# Patient Record
Sex: FEMALE | Race: WHITE | NOT HISPANIC OR LATINO | Employment: PART TIME | URBAN - METROPOLITAN AREA
[De-identification: names, ages, dates, MRNs, and addresses within clinical notes are randomized per-mention and may not be internally consistent; named-entity substitution may affect disease eponyms.]

---

## 2017-01-17 ENCOUNTER — OFFICE VISIT (OUTPATIENT)
Dept: FAMILY MEDICINE | Facility: CLINIC | Age: 67
End: 2017-01-17
Payer: MEDICARE

## 2017-01-17 VITALS
SYSTOLIC BLOOD PRESSURE: 122 MMHG | WEIGHT: 184.06 LBS | BODY MASS INDEX: 33.67 KG/M2 | HEART RATE: 75 BPM | DIASTOLIC BLOOD PRESSURE: 69 MMHG | TEMPERATURE: 99 F

## 2017-01-17 DIAGNOSIS — I25.118 ATHEROSCLEROSIS OF NATIVE CORONARY ARTERY WITH OTHER FORM OF ANGINA PECTORIS, UNSPECIFIED WHETHER NATIVE OR TRANSPLANTED HEART: ICD-10-CM

## 2017-01-17 DIAGNOSIS — I20.89 ANGINA AT REST: ICD-10-CM

## 2017-01-17 DIAGNOSIS — J40 BRONCHITIS: Primary | ICD-10-CM

## 2017-01-17 PROCEDURE — 1157F ADVNC CARE PLAN IN RCRD: CPT | Mod: S$GLB,,, | Performed by: FAMILY MEDICINE

## 2017-01-17 PROCEDURE — 1126F AMNT PAIN NOTED NONE PRSNT: CPT | Mod: S$GLB,,, | Performed by: FAMILY MEDICINE

## 2017-01-17 PROCEDURE — 99999 PR PBB SHADOW E&M-EST. PATIENT-LVL II: CPT | Mod: PBBFAC,,, | Performed by: FAMILY MEDICINE

## 2017-01-17 PROCEDURE — 99213 OFFICE O/P EST LOW 20 MIN: CPT | Mod: 25,S$GLB,, | Performed by: FAMILY MEDICINE

## 2017-01-17 PROCEDURE — 1160F RVW MEDS BY RX/DR IN RCRD: CPT | Mod: S$GLB,,, | Performed by: FAMILY MEDICINE

## 2017-01-17 PROCEDURE — 1159F MED LIST DOCD IN RCRD: CPT | Mod: S$GLB,,, | Performed by: FAMILY MEDICINE

## 2017-01-17 PROCEDURE — 99499 UNLISTED E&M SERVICE: CPT | Mod: S$GLB,,, | Performed by: FAMILY MEDICINE

## 2017-01-17 PROCEDURE — 96372 THER/PROPH/DIAG INJ SC/IM: CPT | Mod: S$GLB,,, | Performed by: FAMILY MEDICINE

## 2017-01-17 RX ORDER — AZITHROMYCIN 250 MG/1
250 TABLET, FILM COATED ORAL DAILY
Qty: 6 TABLET | Refills: 0 | Status: SHIPPED | OUTPATIENT
Start: 2017-01-17 | End: 2017-01-22

## 2017-01-17 RX ORDER — DEXAMETHASONE SODIUM PHOSPHATE 4 MG/ML
8 INJECTION, SOLUTION INTRA-ARTICULAR; INTRALESIONAL; INTRAMUSCULAR; INTRAVENOUS; SOFT TISSUE
Status: COMPLETED | OUTPATIENT
Start: 2017-01-17 | End: 2017-01-17

## 2017-01-17 RX ADMIN — DEXAMETHASONE SODIUM PHOSPHATE 8 MG: 4 INJECTION, SOLUTION INTRA-ARTICULAR; INTRALESIONAL; INTRAMUSCULAR; INTRAVENOUS; SOFT TISSUE at 02:01

## 2017-01-17 NOTE — PROGRESS NOTES
The patient presents co ur hernandez wheezing cough, and fever p severakl years.  Pt follows w CAD  recent angina and some CP w cough   Past Medical History:  Past Medical History   Diagnosis Date    Anticoagulant long-term use     CAD (coronary artery disease)      with stents x 3    GERD (gastroesophageal reflux disease)      Past Surgical History   Procedure Laterality Date    2006 2 stents      2009 1 stent       Review of patient's allergies indicates:   Allergen Reactions    Sulfa (sulfonamide antibiotics) Shortness Of Breath     Current Outpatient Prescriptions on File Prior to Visit   Medication Sig Dispense Refill    aspirin (ECOTRIN) 81 MG EC tablet Take 81 mg by mouth once a week.       atorvastatin (LIPITOR) 40 MG tablet Take 1 tablet (40 mg total) by mouth once daily. 90 tablet 3    clopidogrel (PLAVIX) 75 mg tablet Take 1 tablet (75 mg total) by mouth once daily. 90 tablet 3    NEXIUM 40 mg capsule take 1 capsule by mouth twice a day before MEALS 60 capsule 11    nutritional supplements Liqd Take by mouth.      [DISCONTINUED] diclofenac sodium 1 % Gel Apply 2 g topically 4 (four) times daily. 100 g 2     No current facility-administered medications on file prior to visit.      Social History     Social History    Marital status:      Spouse name: N/A    Number of children: N/A    Years of education: N/A     Occupational History    Not on file.     Social History Main Topics    Smoking status: Never Smoker    Smokeless tobacco: Never Used    Alcohol use No    Drug use: No    Sexual activity: Not on file     Other Topics Concern    Not on file     Social History Narrative    No narrative on file     Family History   Problem Relation Age of Onset    Heart disease Father          ROS:GENERAL: No fever, chills, fatigability or weight loss.  SKIN: No rashes, itching or changes in color or texture of skin.  HEAD: No headaches or recent head trauma.EYES: Visual acuity fine. No  photophobia, ocular pain or diplopia.EARS: Denies ear pain, discharge or vertigo.NOSE: No loss of smell, no epistaxis or postnasal drip.MOUTH & THROAT: No hoarseness or change in voice. No excessive gum bleeding.NODES: Denies swollen glands.  CHEST: Denies MCGARRY, cyanosis, wheezing, cough and sputum production.  CARDIOVASCULAR: Denies chest pain, PND, orthopnea or reduced exercise tolerance.  ABDOMEN: Appetite fine. No weight loss. Denies diarrhea, abdominal pain, hematemesis or blood in stool.  URINARY: No flank pain, dysuria or hematuria.  PERIPHERAL VASCULAR: No claudication or cyanosis.  MUSCULOSKELETAL: See above.  NEUROLOGIC: No history of seizures, paralysis, alteration of gait or coordination.  PE:   HEAD: Normocephalic, atraumatic.EYES: PERRL. EOMI.   EARS: TM's intact. Light reflex normal. No retraction or perforation.   NOSE: Mucosa pink. Airway clear.MOUTH & THROAT: No tonsillar enlargement. No pharyngeal erythema or exudate. No stridor.  NODES: No cervical, axillary or inguinal lymph node enlargement.  CHEST: Mod ronchi and exp wheezing   CARDIOVASCULAR: Normal S1, S2. No rubs, murmurs or gallops.  ABDOMEN: Bowel sounds normal. Not distended. Soft. No tenderness or masses.  MUSCULOSKELETAL: No palpable abnormality  NEUROLOGIC: Cranial Nerves: II-XII grossly intact.  Motor: 5/5 strength major flexors/extensors.  DTR's: Knees, Ankles 2+ and equal bilaterally; downgoing toes.  Sensory: Intact to light touch distally.  Gait & Posture: Normal gait and fine motion. No cerebellar signs.     Impression:Bronchitis w bronchospasm   CAD   Plan: Dexa 8; Zpak   Card fu

## 2017-01-23 ENCOUNTER — TELEPHONE (OUTPATIENT)
Dept: FAMILY MEDICINE | Facility: CLINIC | Age: 67
End: 2017-01-23

## 2017-01-23 RX ORDER — CEPHALEXIN 500 MG/1
500 CAPSULE ORAL EVERY 12 HOURS
Qty: 14 CAPSULE | Refills: 0 | Status: SHIPPED | OUTPATIENT
Start: 2017-01-23 | End: 2017-03-27

## 2017-01-23 NOTE — TELEPHONE ENCOUNTER
----- Message from Tiffany Marsh sent at 1/23/2017 12:16 PM CST -----  Contact: Patient  Patient states she is still congested and wants to know if she needs another round of antibiotics. Please call her back at [phone]. Thank you

## 2017-03-27 ENCOUNTER — TELEPHONE (OUTPATIENT)
Dept: FAMILY MEDICINE | Facility: CLINIC | Age: 67
End: 2017-03-27

## 2017-03-27 ENCOUNTER — OFFICE VISIT (OUTPATIENT)
Dept: FAMILY MEDICINE | Facility: CLINIC | Age: 67
End: 2017-03-27
Payer: MEDICARE

## 2017-03-27 VITALS
BODY MASS INDEX: 34.08 KG/M2 | HEIGHT: 62 IN | DIASTOLIC BLOOD PRESSURE: 85 MMHG | TEMPERATURE: 99 F | HEART RATE: 93 BPM | SYSTOLIC BLOOD PRESSURE: 135 MMHG | WEIGHT: 185.19 LBS

## 2017-03-27 DIAGNOSIS — E78.5 DYSLIPIDEMIA: ICD-10-CM

## 2017-03-27 DIAGNOSIS — E03.9 HYPOTHYROIDISM, UNSPECIFIED TYPE: ICD-10-CM

## 2017-03-27 DIAGNOSIS — I25.10 CORONARY ARTERY DISEASE, ANGINA PRESENCE UNSPECIFIED, UNSPECIFIED VESSEL OR LESION TYPE, UNSPECIFIED WHETHER NATIVE OR TRANSPLANTED HEART: ICD-10-CM

## 2017-03-27 DIAGNOSIS — I25.118 ATHEROSCLEROSIS OF NATIVE CORONARY ARTERY WITH OTHER FORM OF ANGINA PECTORIS, UNSPECIFIED WHETHER NATIVE OR TRANSPLANTED HEART: ICD-10-CM

## 2017-03-27 DIAGNOSIS — L20.9 ATOPIC DERMATITIS, UNSPECIFIED TYPE: Primary | ICD-10-CM

## 2017-03-27 DIAGNOSIS — B35.9 TINEA: ICD-10-CM

## 2017-03-27 PROBLEM — I20.89 ANGINA AT REST: Status: RESOLVED | Noted: 2017-01-17 | Resolved: 2017-03-27

## 2017-03-27 PROCEDURE — 1159F MED LIST DOCD IN RCRD: CPT | Mod: S$GLB,,, | Performed by: FAMILY MEDICINE

## 2017-03-27 PROCEDURE — 99999 PR PBB SHADOW E&M-EST. PATIENT-LVL II: CPT | Mod: PBBFAC,,, | Performed by: FAMILY MEDICINE

## 2017-03-27 PROCEDURE — 1157F ADVNC CARE PLAN IN RCRD: CPT | Mod: S$GLB,,, | Performed by: FAMILY MEDICINE

## 2017-03-27 PROCEDURE — 99213 OFFICE O/P EST LOW 20 MIN: CPT | Mod: S$GLB,,, | Performed by: FAMILY MEDICINE

## 2017-03-27 PROCEDURE — 1160F RVW MEDS BY RX/DR IN RCRD: CPT | Mod: S$GLB,,, | Performed by: FAMILY MEDICINE

## 2017-03-27 RX ORDER — CLOTRIMAZOLE AND BETAMETHASONE DIPROPIONATE 10; .5 MG/ML; MG/ML
LOTION TOPICAL 2 TIMES DAILY
Qty: 30 ML | Refills: 1 | Status: SHIPPED | OUTPATIENT
Start: 2017-03-27 | End: 2018-11-14

## 2017-03-27 NOTE — PROGRESS NOTES
The patient presents with tender itchy rash rt pretibial area  for the past 3 -4 weeks  but no fever.  ROS:  General: No fever or sig wt change  HEENT:No other PND eye pain or dc  Respiratory: No cough wheezing  PE: vital signs noted  HEENT: Normocephalic,with no recent trauma,PERRLA,EOMI,conjunctiva injected with no exudate.Nasopharynx is injected and edematous.External otic canal edematous and injected TM dull  Neck:Supple without adenopathy  Chest:Clear bilateral breath sounds   Heart:Regular rhthym without murmer  Abdomen:Soft, non tender,no masses, no hepatosplenomegaly  Extremeties Rt pretibial area 15x10 cm area of papular eruption   Impression:Atopic dermatitis vs tinea   Plan:  Lotresone and if ftp consider Derm consult

## 2017-03-28 ENCOUNTER — LAB VISIT (OUTPATIENT)
Dept: LAB | Facility: HOSPITAL | Age: 67
End: 2017-03-28
Attending: FAMILY MEDICINE
Payer: MEDICARE

## 2017-03-28 DIAGNOSIS — E03.9 HYPOTHYROIDISM, UNSPECIFIED TYPE: ICD-10-CM

## 2017-03-28 DIAGNOSIS — I25.10 CORONARY ARTERY DISEASE, ANGINA PRESENCE UNSPECIFIED, UNSPECIFIED VESSEL OR LESION TYPE, UNSPECIFIED WHETHER NATIVE OR TRANSPLANTED HEART: ICD-10-CM

## 2017-03-28 LAB
ALBUMIN SERPL BCP-MCNC: 3.9 G/DL
ALP SERPL-CCNC: 105 U/L
ALT SERPL W/O P-5'-P-CCNC: 19 U/L
ANION GAP SERPL CALC-SCNC: 10 MMOL/L
AST SERPL-CCNC: 17 U/L
BASOPHILS # BLD AUTO: 0.03 K/UL
BASOPHILS NFR BLD: 0.4 %
BILIRUB SERPL-MCNC: 0.6 MG/DL
BNP SERPL-MCNC: 29 PG/ML
BUN SERPL-MCNC: 17 MG/DL
CALCIUM SERPL-MCNC: 9.9 MG/DL
CHLORIDE SERPL-SCNC: 107 MMOL/L
CHOLEST/HDLC SERPL: 2.7 {RATIO}
CO2 SERPL-SCNC: 25 MMOL/L
CREAT SERPL-MCNC: 0.8 MG/DL
DIFFERENTIAL METHOD: NORMAL
EOSINOPHIL # BLD AUTO: 0.2 K/UL
EOSINOPHIL NFR BLD: 3.4 %
ERYTHROCYTE [DISTWIDTH] IN BLOOD BY AUTOMATED COUNT: 12.4 %
EST. GFR  (AFRICAN AMERICAN): >60 ML/MIN/1.73 M^2
EST. GFR  (NON AFRICAN AMERICAN): >60 ML/MIN/1.73 M^2
GLUCOSE SERPL-MCNC: 107 MG/DL
HCT VFR BLD AUTO: 40.3 %
HDL/CHOLESTEROL RATIO: 37.1 %
HDLC SERPL-MCNC: 143 MG/DL
HDLC SERPL-MCNC: 53 MG/DL
HGB BLD-MCNC: 13.4 G/DL
LDLC SERPL CALC-MCNC: 73.4 MG/DL
LYMPHOCYTES # BLD AUTO: 1.8 K/UL
LYMPHOCYTES NFR BLD: 25.5 %
MCH RBC QN AUTO: 30.1 PG
MCHC RBC AUTO-ENTMCNC: 33.3 %
MCV RBC AUTO: 91 FL
MONOCYTES # BLD AUTO: 0.4 K/UL
MONOCYTES NFR BLD: 5.3 %
NEUTROPHILS # BLD AUTO: 4.5 K/UL
NEUTROPHILS NFR BLD: 65.1 %
NONHDLC SERPL-MCNC: 90 MG/DL
PLATELET # BLD AUTO: 182 K/UL
PMV BLD AUTO: 11.9 FL
POTASSIUM SERPL-SCNC: 4.8 MMOL/L
PROT SERPL-MCNC: 7.8 G/DL
RBC # BLD AUTO: 4.45 M/UL
SODIUM SERPL-SCNC: 142 MMOL/L
T4 FREE SERPL-MCNC: 0.78 NG/DL
TRIGL SERPL-MCNC: 83 MG/DL
TSH SERPL DL<=0.005 MIU/L-ACNC: 5.13 UIU/ML
WBC # BLD AUTO: 6.98 K/UL

## 2017-03-28 PROCEDURE — 80053 COMPREHEN METABOLIC PANEL: CPT

## 2017-03-28 PROCEDURE — 85025 COMPLETE CBC W/AUTO DIFF WBC: CPT

## 2017-03-28 PROCEDURE — 36415 COLL VENOUS BLD VENIPUNCTURE: CPT | Mod: PO

## 2017-03-28 PROCEDURE — 84443 ASSAY THYROID STIM HORMONE: CPT

## 2017-03-28 PROCEDURE — 83880 ASSAY OF NATRIURETIC PEPTIDE: CPT

## 2017-03-28 PROCEDURE — 84439 ASSAY OF FREE THYROXINE: CPT

## 2017-03-28 PROCEDURE — 80061 LIPID PANEL: CPT

## 2017-03-30 ENCOUNTER — TELEPHONE (OUTPATIENT)
Dept: FAMILY MEDICINE | Facility: CLINIC | Age: 67
End: 2017-03-30

## 2017-03-30 DIAGNOSIS — E03.9 HYPOTHYROIDISM, UNSPECIFIED TYPE: Primary | ICD-10-CM

## 2017-04-10 RX ORDER — CLOPIDOGREL BISULFATE 75 MG/1
TABLET ORAL
Qty: 90 TABLET | Refills: 3 | Status: SHIPPED | OUTPATIENT
Start: 2017-04-10 | End: 2017-04-13 | Stop reason: SDUPTHER

## 2017-04-10 RX ORDER — ATORVASTATIN CALCIUM 40 MG/1
TABLET, FILM COATED ORAL
Qty: 90 TABLET | Refills: 3 | Status: SHIPPED | OUTPATIENT
Start: 2017-04-10 | End: 2017-04-13 | Stop reason: SDUPTHER

## 2017-04-13 NOTE — TELEPHONE ENCOUNTER
----- Message from Laney Miller sent at 4/13/2017 10:27 AM CDT -----  Contact: self   Patient need a refill on Plavix,daphnie send to [facility], any questions please call back at [phone] (home)     [facility] - [place], [place] - [address]  [address] [address]  Phone: [phone] Fax: [phone]

## 2017-04-14 RX ORDER — ATORVASTATIN CALCIUM 40 MG/1
40 TABLET, FILM COATED ORAL DAILY
Qty: 90 TABLET | Refills: 3 | Status: SHIPPED | OUTPATIENT
Start: 2017-04-14 | End: 2018-03-20 | Stop reason: SDUPTHER

## 2017-04-14 RX ORDER — CLOPIDOGREL BISULFATE 75 MG/1
75 TABLET ORAL DAILY
Qty: 90 TABLET | Refills: 3 | Status: SHIPPED | OUTPATIENT
Start: 2017-04-14 | End: 2018-03-20 | Stop reason: SDUPTHER

## 2017-05-11 ENCOUNTER — PATIENT OUTREACH (OUTPATIENT)
Dept: ADMINISTRATIVE | Facility: HOSPITAL | Age: 67
End: 2017-05-11

## 2017-05-24 ENCOUNTER — OFFICE VISIT (OUTPATIENT)
Dept: FAMILY MEDICINE | Facility: CLINIC | Age: 67
End: 2017-05-24
Payer: MEDICARE

## 2017-05-24 ENCOUNTER — HOSPITAL ENCOUNTER (OUTPATIENT)
Dept: RADIOLOGY | Facility: HOSPITAL | Age: 67
Discharge: HOME | End: 2017-05-24
Attending: FAMILY MEDICINE
Payer: MEDICARE

## 2017-05-24 VITALS
DIASTOLIC BLOOD PRESSURE: 66 MMHG | HEIGHT: 62 IN | WEIGHT: 182 LBS | SYSTOLIC BLOOD PRESSURE: 125 MMHG | BODY MASS INDEX: 33.49 KG/M2 | HEART RATE: 74 BPM | TEMPERATURE: 98 F

## 2017-05-24 DIAGNOSIS — S63.631D SPRAIN OF INTERPHALANGEAL JOINT OF LEFT INDEX FINGER, SUBSEQUENT ENCOUNTER: Primary | ICD-10-CM

## 2017-05-24 DIAGNOSIS — S69.91XA FINGER INJURY, RIGHT, INITIAL ENCOUNTER: ICD-10-CM

## 2017-05-24 DIAGNOSIS — S69.91XA FINGER INJURY, RIGHT, INITIAL ENCOUNTER: Primary | ICD-10-CM

## 2017-05-24 PROCEDURE — 1159F MED LIST DOCD IN RCRD: CPT | Mod: S$GLB,,, | Performed by: FAMILY MEDICINE

## 2017-05-24 PROCEDURE — 73140 X-RAY EXAM OF FINGER(S): CPT | Mod: 26,RT,, | Performed by: RADIOLOGY

## 2017-05-24 PROCEDURE — 99213 OFFICE O/P EST LOW 20 MIN: CPT | Mod: S$GLB,,, | Performed by: FAMILY MEDICINE

## 2017-05-24 PROCEDURE — 99999 PR PBB SHADOW E&M-EST. PATIENT-LVL II: CPT | Mod: PBBFAC,,, | Performed by: FAMILY MEDICINE

## 2017-05-24 PROCEDURE — 73140 X-RAY EXAM OF FINGER(S): CPT | Mod: TC,PO,RT

## 2017-05-24 PROCEDURE — 1160F RVW MEDS BY RX/DR IN RCRD: CPT | Mod: S$GLB,,, | Performed by: FAMILY MEDICINE

## 2017-05-24 NOTE — PROGRESS NOTES
The patient presents with tender painful dip for the past 6w nnot resp to splinting ROS:  General: No fever or sig wt change  HEENT:No other PND eye pain or dc  Respiratory: No cough wheezing  PE: vital signs noted  HEENT: Normocephalic,with no recent trauma,PERRLA,EOMI,conjunctiva injected with no exudate.Nasopharynx is injected and edematous.External otic canal edematous and injected TM dull  Neck:Supple without adenopathy  Chest:Clear bilateral breath sounds    Heart:Regular rhthym without murmer  Abdomen:Soft, non tender,no masses, no hepatosplenomegaly  Extremeties Mallet finger deformity    Impression Finger sprain   Plan: XR  Ortho con

## 2017-10-10 ENCOUNTER — OFFICE VISIT (OUTPATIENT)
Dept: CARDIOLOGY | Facility: CLINIC | Age: 67
End: 2017-10-10
Payer: MEDICARE

## 2017-10-10 VITALS
WEIGHT: 187.63 LBS | SYSTOLIC BLOOD PRESSURE: 131 MMHG | HEART RATE: 69 BPM | DIASTOLIC BLOOD PRESSURE: 64 MMHG | HEIGHT: 62 IN | BODY MASS INDEX: 34.53 KG/M2

## 2017-10-10 DIAGNOSIS — E78.5 DYSLIPIDEMIA: ICD-10-CM

## 2017-10-10 DIAGNOSIS — I25.118 ATHEROSCLEROSIS OF NATIVE CORONARY ARTERY WITH OTHER FORM OF ANGINA PECTORIS, UNSPECIFIED WHETHER NATIVE OR TRANSPLANTED HEART: Primary | ICD-10-CM

## 2017-10-10 PROCEDURE — 99499 UNLISTED E&M SERVICE: CPT | Mod: S$GLB,,, | Performed by: INTERNAL MEDICINE

## 2017-10-10 PROCEDURE — 99214 OFFICE O/P EST MOD 30 MIN: CPT | Mod: S$GLB,,, | Performed by: INTERNAL MEDICINE

## 2017-10-10 PROCEDURE — 99999 PR PBB SHADOW E&M-EST. PATIENT-LVL II: CPT | Mod: PBBFAC,,, | Performed by: INTERNAL MEDICINE

## 2017-10-10 NOTE — PROGRESS NOTES
Subjective:    Patient ID:  Flores Adamson is a 67 y.o. female who presents for follow-up of cad    HPI  She comes with no complaints, no chest pain, no shortness of breath  FC II    Review of Systems   Constitution: Negative for decreased appetite, weakness, malaise/fatigue, weight gain and weight loss.   Cardiovascular: Negative for chest pain, dyspnea on exertion, leg swelling, palpitations and syncope.   Respiratory: Negative for cough and shortness of breath.    Gastrointestinal: Negative.    All other systems reviewed and are negative.       Objective:    Physical Exam   Constitutional: She is oriented to person, place, and time. She appears well-developed and well-nourished.   HENT:   Head: Normocephalic.   Eyes: Pupils are equal, round, and reactive to light.   Neck: Normal range of motion. Neck supple. No JVD present. Carotid bruit is not present. No thyromegaly present.   Cardiovascular: Normal rate, regular rhythm, normal heart sounds, intact distal pulses and normal pulses.  PMI is not displaced.  Exam reveals no gallop.    No murmur heard.  Pulmonary/Chest: Effort normal and breath sounds normal.   Abdominal: Soft. Normal appearance. She exhibits no mass. There is no hepatosplenomegaly. There is no tenderness.   Musculoskeletal: Normal range of motion. She exhibits no edema.   Neurological: She is alert and oriented to person, place, and time. She has normal strength and normal reflexes. No sensory deficit.   Skin: Skin is warm and intact.   Psychiatric: She has a normal mood and affect.   Nursing note and vitals reviewed.        Assessment:       1. Atherosclerosis of native coronary artery with other form of angina pectoris, unspecified whether native or transplanted heart    2. Dyslipidemia         Plan:     Continue all cardiac medications  Regular exercise program  Weight loss  1 yr f/u with stress echo

## 2017-11-15 ENCOUNTER — OFFICE VISIT (OUTPATIENT)
Dept: FAMILY MEDICINE | Facility: CLINIC | Age: 67
End: 2017-11-15
Payer: MEDICARE

## 2017-11-15 VITALS
SYSTOLIC BLOOD PRESSURE: 119 MMHG | DIASTOLIC BLOOD PRESSURE: 72 MMHG | TEMPERATURE: 98 F | WEIGHT: 185 LBS | BODY MASS INDEX: 34.04 KG/M2 | HEIGHT: 62 IN | HEART RATE: 68 BPM

## 2017-11-15 DIAGNOSIS — J06.9 VIRAL URI WITH COUGH: Primary | ICD-10-CM

## 2017-11-15 PROCEDURE — 96372 THER/PROPH/DIAG INJ SC/IM: CPT | Mod: S$GLB,,, | Performed by: FAMILY MEDICINE

## 2017-11-15 PROCEDURE — 99999 PR PBB SHADOW E&M-EST. PATIENT-LVL III: CPT | Mod: PBBFAC,,, | Performed by: NURSE PRACTITIONER

## 2017-11-15 PROCEDURE — 99213 OFFICE O/P EST LOW 20 MIN: CPT | Mod: 25,S$GLB,, | Performed by: NURSE PRACTITIONER

## 2017-11-15 RX ORDER — DEXAMETHASONE SODIUM PHOSPHATE 4 MG/ML
8 INJECTION, SOLUTION INTRA-ARTICULAR; INTRALESIONAL; INTRAMUSCULAR; INTRAVENOUS; SOFT TISSUE
Status: COMPLETED | OUTPATIENT
Start: 2017-11-15 | End: 2017-11-15

## 2017-11-15 RX ORDER — BENZONATATE 200 MG/1
200 CAPSULE ORAL 3 TIMES DAILY PRN
Qty: 30 CAPSULE | Refills: 0 | Status: SHIPPED | OUTPATIENT
Start: 2017-11-15 | End: 2017-11-25

## 2017-11-15 RX ADMIN — DEXAMETHASONE SODIUM PHOSPHATE 8 MG: 4 INJECTION, SOLUTION INTRA-ARTICULAR; INTRALESIONAL; INTRAMUSCULAR; INTRAVENOUS; SOFT TISSUE at 10:11

## 2017-11-15 NOTE — PATIENT INSTRUCTIONS
Increase fluid intake  Rest  Tylenol as directed, as needed for headache/pain  Coricidin HBP for symptomatic care of symptoms  Humidified air  Lozenges and/or chloraseptic spray as needed for sore throat  Warm salt water gargles as needed for sore throat  Educational handout provided        Adult Self-Care for Colds  Colds are caused by viruses. They can't be cured with antibiotics. However, you can ease symptoms and support your body's efforts to heal itself.  No matter which symptoms you have, be sure to:  · Drink plenty of fluids (water or clear soup)  · Stop smoking and drinking alcohol  · Get plenty of rest    Understand a fever  · Take your temperature several times a day. If your fever is 100.4°F (38.0°C) for more than a day, call your healthcare provider.  · Relax, lie down. Go to bed if you want. Just get off your feet and rest. Also, drink plenty of fluids to avoid dehydration.  · Take acetaminophen or a nonsteroidal anti-inflammatory agent (NSAID), such as ibuprofen.  Treat a troubled nose kindly  · Breathe steam or heated humidified air to open blocked nasal passages.  a hot shower or use a vaporizer. Be careful not to get burned by the steam.  · Saline nasal sprays and decongestant tablets help open a stuffy nose. Antihistamines can also help, but they can cause side effects such as drowsiness and drying of the eyes, nose, and mouth.  Soothe a sore throat and cough  · Gargle every 2 hours with 1/4 teaspoon of salt dissolved in 1/2 cup of warm water. Suck on throat lozenges and cough drops to moisten your throat.  · Cough medicines are available but it is unclear how well they actually work.  · Take acetaminophen or an NSAID, such as ibuprofen, to ease throat pain  Ease digestive problems  · Put fluids back into your body. Take frequent sips of clear liquids such as water or broth. Avoid drinks that have a lot of sugar in them, such as juices and sodas. These can make diarrhea worse. Older  children and adults can drink sports drinks.  · As your appetite returns, you can resume your normal diet. Ask your healthcare provider if there are any foods you should avoid.  When to seek medical care  When you first notice symptoms, ask your healthcare provider if antiviral medicines are appropriate. Antibiotics should not be taken for colds or flu. Also, call your healthcare provider if you have any of the following symptoms or if you aren't feeling better after 7 days:  · Shortness of breath  · Pain or pressure in the chest or belly (abdomen)  · Worsening symptoms, especially after a period of improvement  · Fever of 100.4°F  (38.0°C) or higher, or fever that doesn't go down with medicine  · Sudden dizziness or confusion  · Severe or continued vomiting  · Signs of dehydration, including extreme thirst, dark urine, infrequent urination, dry mouth  · Spotted, red, or very sore throat   Date Last Reviewed: 12/1/2016  © 1575-3710 [employer]. [address] [place] [address], [place] [address]. All rights reserved. This information is not intended as a substitute for professional medical care. Always follow your healthcare professional's instructions.

## 2017-11-15 NOTE — PROGRESS NOTES
"Subjective:      Patient ID: Flores Adamson is a 67 y.o. female.    Chief Complaint: Cough; Nasal Congestion; and Sore Throat    Cough   This is a new problem. Episode onset: Monday. The problem has been gradually worsening. The cough is productive of sputum (productive of pale yellow sputum). Associated symptoms include nasal congestion, postnasal drip, rhinorrhea and a sore throat. Pertinent negatives include no chest pain, chills, ear congestion, ear pain, fever, headaches, myalgias, rash, shortness of breath, sweats or wheezing. Nothing aggravates the symptoms. She has tried nothing for the symptoms. The treatment provided no relief.   Sore Throat    This is a new problem. Neither side of throat is experiencing more pain than the other. There has been no fever. The pain is moderate. Associated symptoms include congestion, coughing and a hoarse voice. Pertinent negatives include no ear pain, headaches, shortness of breath, stridor, swollen glands, trouble swallowing or vomiting. She has had no exposure to strep or mono. Treatments tried: peroxide gargle. The treatment provided mild relief.     Review of Systems   Constitutional: Negative for chills, fatigue and fever.   HENT: Positive for congestion, hoarse voice, postnasal drip, rhinorrhea and sore throat. Negative for ear pain, sinus pain, sinus pressure and trouble swallowing.    Eyes: Negative.    Respiratory: Positive for cough. Negative for shortness of breath, wheezing and stridor.    Cardiovascular: Negative for chest pain, palpitations and leg swelling.   Gastrointestinal: Negative.  Negative for vomiting.   Endocrine: Negative.    Genitourinary: Negative.    Musculoskeletal: Negative for myalgias.   Skin: Negative for rash and wound.   Neurological: Negative for weakness, numbness and headaches.   Psychiatric/Behavioral: The patient is not nervous/anxious.        Objective:     /72   Pulse 68   Temp 98.1 °F (36.7 °C) (Oral)   Ht 5' 2" (1.575 " m)   Wt 83.9 kg (185 lb)   BMI 33.84 kg/m²     Physical Exam   Constitutional: She is oriented to person, place, and time. She appears well-developed and well-nourished.   HENT:   Head: Normocephalic.   Right Ear: Tympanic membrane, external ear and ear canal normal.   Left Ear: Tympanic membrane, external ear and ear canal normal.   Nose: Mucosal edema and rhinorrhea present. Right sinus exhibits no maxillary sinus tenderness and no frontal sinus tenderness. Left sinus exhibits no maxillary sinus tenderness and no frontal sinus tenderness.   Mouth/Throat: Posterior oropharyngeal erythema (very mild) present. No oropharyngeal exudate or posterior oropharyngeal edema.   Nasal mucosa erythematous and boggy   Eyes: Pupils are equal, round, and reactive to light.   Cardiovascular: Normal rate, regular rhythm and normal heart sounds.    Pulmonary/Chest: Effort normal and breath sounds normal. No respiratory distress. She has no wheezes. She has no rales.   Neurological: She is alert and oriented to person, place, and time.   Skin: Skin is warm and dry. No rash noted.   Psychiatric: She has a normal mood and affect. Her behavior is normal. Judgment and thought content normal.   Vitals reviewed.    Assessment:     1. Viral URI with cough        Plan:   Viral URI with cough  -     dexamethasone injection 8 mg; Inject 2 mLs (8 mg total) into the muscle one time.  -     benzonatate (TESSALON) 200 MG capsule; Take 1 capsule (200 mg total) by mouth 3 (three) times daily as needed.  Dispense: 30 capsule; Refill: 0    Symptom care discussed and educational handout provided  Return if symptoms worsen or fail to improve.

## 2017-12-15 ENCOUNTER — DOCUMENTATION ONLY (OUTPATIENT)
Dept: GASTROENTEROLOGY | Facility: CLINIC | Age: 67
End: 2017-12-15

## 2017-12-15 NOTE — PROGRESS NOTES
Colonoscopy has been completed and faxed to [facility] as attestation documentation for Colorectal Cancer Screening. 2017 measure has been met.

## 2018-01-08 ENCOUNTER — TELEPHONE (OUTPATIENT)
Dept: FAMILY MEDICINE | Facility: CLINIC | Age: 68
End: 2018-01-08

## 2018-01-08 NOTE — TELEPHONE ENCOUNTER
----- Message from Felicia Nguyen sent at 1/8/2018  9:57 AM CST -----  Contact: Flores  ( pt wife )  Flores  ( pt wife ) is requesting a call from nurse to review labs.            Please callFlores  ( pt wife ) pt back at [phone]

## 2018-01-15 ENCOUNTER — PES CALL (OUTPATIENT)
Dept: ADMINISTRATIVE | Facility: CLINIC | Age: 68
End: 2018-01-15

## 2018-03-14 ENCOUNTER — TELEPHONE (OUTPATIENT)
Dept: CARDIOLOGY | Facility: CLINIC | Age: 68
End: 2018-03-14

## 2018-03-14 NOTE — TELEPHONE ENCOUNTER
----- Message from Quin Araujo sent at 3/14/2018  9:56 AM CDT -----  Patient stated she is scheduled for surgery on Friday, March 16th for broken wrist/needs doctor's authorization to get off of medication: Plavix/please call back at [phone] to advise.

## 2018-03-14 NOTE — TELEPHONE ENCOUNTER
Cardiac clearance: patient having a procedure on her wrist. Can she hold PLAVIX/ASA if so how long.

## 2018-03-15 ENCOUNTER — TELEPHONE (OUTPATIENT)
Dept: CARDIOLOGY | Facility: CLINIC | Age: 68
End: 2018-03-15

## 2018-03-15 NOTE — TELEPHONE ENCOUNTER
----- Message from Quin Zafar sent at 3/15/2018  2:15 PM CDT -----  Lizett with [facility] is requesting a copy of patient EKG, stress test and Plavix instructions faxed to her at [phone], patient is scheduled for surgery tomorrow, please contact her at [phone].    Thank you

## 2018-03-15 NOTE — TELEPHONE ENCOUNTER
No contraindication for surgery/anesthesia from cardiac standpoint. She may hold her plavix/ASA for 3 days if needed and resume after.

## 2018-03-20 RX ORDER — CLOPIDOGREL BISULFATE 75 MG/1
TABLET ORAL
Qty: 90 TABLET | Refills: 3 | Status: SHIPPED | OUTPATIENT
Start: 2018-03-20 | End: 2018-11-26 | Stop reason: ALTCHOICE

## 2018-03-20 RX ORDER — ATORVASTATIN CALCIUM 40 MG/1
TABLET, FILM COATED ORAL
Qty: 90 TABLET | Refills: 3 | Status: SHIPPED | OUTPATIENT
Start: 2018-03-20 | End: 2019-02-11 | Stop reason: SDUPTHER

## 2018-04-06 ENCOUNTER — TELEPHONE (OUTPATIENT)
Dept: FAMILY MEDICINE | Facility: CLINIC | Age: 68
End: 2018-04-06

## 2018-04-06 NOTE — TELEPHONE ENCOUNTER
----- Message from Eve Carlin sent at 4/6/2018  1:03 PM CDT -----  Contact: Pt  Pt called and stated she needed to speak to the nurse. She stated that she has a death in the family and did not give further details. She can be reached at [phone].    Thanks,  Tf

## 2018-07-10 ENCOUNTER — LAB VISIT (OUTPATIENT)
Dept: LAB | Facility: HOSPITAL | Age: 68
End: 2018-07-10
Attending: NURSE PRACTITIONER
Payer: MEDICARE

## 2018-07-10 ENCOUNTER — OFFICE VISIT (OUTPATIENT)
Dept: FAMILY MEDICINE | Facility: CLINIC | Age: 68
End: 2018-07-10
Payer: MEDICARE

## 2018-07-10 VITALS
DIASTOLIC BLOOD PRESSURE: 67 MMHG | SYSTOLIC BLOOD PRESSURE: 109 MMHG | HEIGHT: 62 IN | BODY MASS INDEX: 31.28 KG/M2 | TEMPERATURE: 98 F | HEART RATE: 78 BPM | WEIGHT: 170 LBS

## 2018-07-10 DIAGNOSIS — K13.70 UNSPECIFIED LESIONS OF ORAL MUCOSA: ICD-10-CM

## 2018-07-10 DIAGNOSIS — K13.70 UNSPECIFIED LESIONS OF ORAL MUCOSA: Primary | ICD-10-CM

## 2018-07-10 DIAGNOSIS — Z79.899 OTHER LONG TERM (CURRENT) DRUG THERAPY: ICD-10-CM

## 2018-07-10 LAB
BASOPHILS # BLD AUTO: 0.04 K/UL
BASOPHILS NFR BLD: 0.6 %
DIFFERENTIAL METHOD: NORMAL
EOSINOPHIL # BLD AUTO: 0.2 K/UL
EOSINOPHIL NFR BLD: 2.5 %
ERYTHROCYTE [DISTWIDTH] IN BLOOD BY AUTOMATED COUNT: 12.5 %
HCT VFR BLD AUTO: 40.9 %
HGB BLD-MCNC: 13.2 G/DL
IMM GRANULOCYTES # BLD AUTO: 0.01 K/UL
IMM GRANULOCYTES NFR BLD AUTO: 0.1 %
LYMPHOCYTES # BLD AUTO: 1.6 K/UL
LYMPHOCYTES NFR BLD: 21.8 %
MCH RBC QN AUTO: 30.7 PG
MCHC RBC AUTO-ENTMCNC: 32.3 G/DL
MCV RBC AUTO: 95 FL
MONOCYTES # BLD AUTO: 0.6 K/UL
MONOCYTES NFR BLD: 8.5 %
NEUTROPHILS # BLD AUTO: 4.7 K/UL
NEUTROPHILS NFR BLD: 66.5 %
NRBC BLD-RTO: 0 /100 WBC
PLATELET # BLD AUTO: 156 K/UL
PMV BLD AUTO: 12.8 FL
RBC # BLD AUTO: 4.3 M/UL
VIT B12 SERPL-MCNC: 747 PG/ML
WBC # BLD AUTO: 7.1 K/UL

## 2018-07-10 PROCEDURE — 82607 VITAMIN B-12: CPT

## 2018-07-10 PROCEDURE — 87070 CULTURE OTHR SPECIMN AEROBIC: CPT

## 2018-07-10 PROCEDURE — 36415 COLL VENOUS BLD VENIPUNCTURE: CPT | Mod: PO

## 2018-07-10 PROCEDURE — 99213 OFFICE O/P EST LOW 20 MIN: CPT | Mod: S$GLB,,, | Performed by: NURSE PRACTITIONER

## 2018-07-10 PROCEDURE — 85025 COMPLETE CBC W/AUTO DIFF WBC: CPT

## 2018-07-10 PROCEDURE — 99999 PR PBB SHADOW E&M-EST. PATIENT-LVL V: CPT | Mod: PBBFAC,,, | Performed by: NURSE PRACTITIONER

## 2018-07-10 NOTE — PROGRESS NOTES
Subjective:       Patient ID: Flores Adamson is a 67 y.o. female.    Chief Complaint: Mouth Lesions    Mouth Lesions    The current episode started yesterday. The onset was sudden. The problem occurs rarely. The problem has been unchanged. The problem is mild (No pain at present; states have bleed; pt currently takes plavix). Nothing relieves the symptoms. Nothing aggravates the symptoms. Associated symptoms include mouth sores. There were no sick contacts. She has received no recent medical care.     Past Medical History:   Diagnosis Date    Anticoagulant long-term use     CAD (coronary artery disease)     with stents x 3    GERD (gastroesophageal reflux disease)      Social History     Social History    Marital status:      Spouse name: N/A    Number of children: N/A    Years of education: N/A     Occupational History    Not on file.     Social History Main Topics    Smoking status: Never Smoker    Smokeless tobacco: Never Used    Alcohol use No    Drug use: No    Sexual activity: Not on file     Social History Narrative    No narrative on file     Past Surgical History:   Procedure Laterality Date    2006 2 stents      2009 1 stent      WRIST FRACTURE SURGERY  03/2018       Review of Systems   Constitutional: Negative.    HENT: Positive for mouth sores.    Eyes: Negative.    Respiratory: Negative.    Cardiovascular: Negative.    Gastrointestinal: Negative.    Endocrine: Negative.    Genitourinary: Negative.    Musculoskeletal: Negative.    Skin: Negative.    Allergic/Immunologic: Negative.    Neurological: Negative.    Psychiatric/Behavioral: Negative.        Objective:      Physical Exam   Constitutional: She is oriented to person, place, and time. She appears well-developed and well-nourished.   HENT:   Head: Normocephalic.   Right Ear: External ear normal.   Left Ear: External ear normal.   Nose: Nose normal.   Mouth/Throat:       Eyes: Conjunctivae are normal. Pupils are equal,  round, and reactive to light.   Neck: Normal range of motion. Neck supple.   Cardiovascular: Normal rate, regular rhythm and normal heart sounds.    Pulmonary/Chest: Effort normal and breath sounds normal.   Abdominal: Soft. Bowel sounds are normal.   Musculoskeletal: Normal range of motion.   Neurological: She is alert and oriented to person, place, and time.   Skin: Skin is warm and dry. Capillary refill takes 2 to 3 seconds.   Psychiatric: She has a normal mood and affect. Her behavior is normal. Judgment and thought content normal.   Nursing note and vitals reviewed.      Assessment:       1. Unspecified lesions of oral mucosa    2. Other long term (current) drug therapy         Plan:           Flores was seen today for mouth lesions.    Diagnoses and all orders for this visit:    Unspecified lesions of oral mucosa  Other long term (current) drug therapy   -     CULTURE, AEROBIC  (SPECIFY SOURCE)  -     CBC auto differential; Future  -     Vitamin B12; Future  -     Ambulatory referral to ENT  -     (Magic mouthwash) 1:1:1 Benadryl 12.5mg/5ml liq, aluminum & magnesium hydroxide-simehticone (Maalox), lidocaine viscous 2%; Swish and spit 5 mLs every 8 (eight) hours as needed. for mouth sores

## 2018-07-11 ENCOUNTER — OFFICE VISIT (OUTPATIENT)
Dept: OTOLARYNGOLOGY | Facility: CLINIC | Age: 68
End: 2018-07-11
Payer: MEDICARE

## 2018-07-11 VITALS
BODY MASS INDEX: 32.33 KG/M2 | WEIGHT: 175.69 LBS | SYSTOLIC BLOOD PRESSURE: 127 MMHG | HEART RATE: 63 BPM | DIASTOLIC BLOOD PRESSURE: 75 MMHG | HEIGHT: 62 IN

## 2018-07-11 DIAGNOSIS — Z79.01 ANTICOAGULANT LONG-TERM USE: ICD-10-CM

## 2018-07-11 DIAGNOSIS — H61.23 BILATERAL IMPACTED CERUMEN: Primary | ICD-10-CM

## 2018-07-11 DIAGNOSIS — R23.3 PETECHIAE OF PALATE: ICD-10-CM

## 2018-07-11 PROCEDURE — 69210 REMOVE IMPACTED EAR WAX UNI: CPT | Mod: S$GLB,,, | Performed by: NURSE PRACTITIONER

## 2018-07-11 PROCEDURE — 99999 PR PBB SHADOW E&M-EST. PATIENT-LVL III: CPT | Mod: PBBFAC,,, | Performed by: NURSE PRACTITIONER

## 2018-07-11 PROCEDURE — 99203 OFFICE O/P NEW LOW 30 MIN: CPT | Mod: 25,S$GLB,, | Performed by: NURSE PRACTITIONER

## 2018-07-11 NOTE — LETTER
July 11, 2018      [place]  [address]  [place] [address]           [place] [facility]  [address] [facility] [address] [place] [address]  Phone: [phone]  Fax: [phone]          Patient: Flores Adamson   MR Number: [id number]   YOB: 1950   Date of Visit: 7/11/2018       Dear Josy Tello:    Thank you for referring Flores Adamson to me for evaluation. Attached you will find relevant portions of my assessment and plan of care.    If you have questions, please do not hesitate to call me. I look forward to following Flores Adamson along with you.    Sincerely,    Julissa Rodriguez NP    Enclosure  CC:  No Recipients    If you would like to receive this communication electronically, please contact [email or web address] or [phone] to request more information on [email or web address] Link access.    For providers and/or their staff who would like to refer a patient to [facility], please contact us through our one-stop-shop provider referral line, [facility], at [phone].    If you feel you have received this communication in error or would no longer like to receive these types of communications, please e-mail [email or web address]

## 2018-07-11 NOTE — PROGRESS NOTES
Subjective:       Patient ID: Flores Adamson is a 67 y.o. female.    Chief Complaint: No chief complaint on file.    HPI   Patient is referred by Josy Tello for consultation for oral lesion. She is on Plavix and aspirin. She states she has always brushed the roof of her mouth and her tongue with her toothbrush at the same time as brushing her teeth, and occasionally sees blood when she spits. Yesterday she felt that she had something in the back of her throat and when she looked back there, she saw 4 tiny red dots on the roof of her mouth in a linear pattern. She saw her PCP who swabbed this area for culture. No results yet.     Review of Systems   Constitutional: Negative.    HENT: Negative.    Eyes: Negative.    Respiratory: Negative.    Cardiovascular: Negative.    Gastrointestinal: Negative.    Musculoskeletal: Negative.    Skin: Negative.    Neurological: Negative.    Hematological: Negative.    Psychiatric/Behavioral: Negative.        Objective:      Physical Exam   Constitutional: She is oriented to person, place, and time. Vital signs are normal. She appears well-developed and well-nourished. She is cooperative. She does not appear ill. No distress.   HENT:   Head: Normocephalic and atraumatic.   Right Ear: Hearing, tympanic membrane, external ear and ear canal normal. Tympanic membrane is not erythematous. No middle ear effusion.   Left Ear: Hearing, tympanic membrane, external ear and ear canal normal. Tympanic membrane is not erythematous.  No middle ear effusion.   Nose: Nose normal. No mucosal edema or rhinorrhea. Right sinus exhibits no maxillary sinus tenderness and no frontal sinus tenderness. Left sinus exhibits no maxillary sinus tenderness and no frontal sinus tenderness.   Mouth/Throat: Uvula is midline, oropharynx is clear and moist and mucous membranes are normal. Mucous membranes are not pale, not dry and not cyanotic. No oral lesions. No oropharyngeal exudate, posterior  oropharyngeal edema or posterior oropharyngeal erythema.         SEPARATE PROCEDURE IN OFFICE:   Procedure: Removal of impacted cerumen, bilateral   Pre Procedure Diagnosis: Cerumen Impaction   Post Procedure Diagnosis: Cerumen Impaction   Verbal informed consent in regards to risk of trauma to ear canal, ear drum or hearing, discomfort during procedure and/or inability to remove cerumen impaction in one session or unforeseen events or complications.   No anesthesia.     Procedure in detail:   Ear canal visualized bilateral with appropriate size ear speculum utilizing Operating Head Binocular Otomicroscope   Utilizing the following:  Ring curet was used. The impacted cerumen of the ear canals was removed atraumatically. The TM and EAC were then inspected and found to be clear of wax. See description of TMs/EACs in PE above.   Complications: No   Condition: Improved/Good     Eyes: Conjunctivae, EOM and lids are normal. Pupils are equal, round, and reactive to light. Right eye exhibits no discharge. Left eye exhibits no discharge. No scleral icterus.   Neck: Trachea normal and normal range of motion. Neck supple. No tracheal deviation present. No thyroid mass and no thyromegaly present.   Cardiovascular: Normal rate.    Pulmonary/Chest: Effort normal. No stridor. No respiratory distress. She has no wheezes.   Musculoskeletal: Normal range of motion.   Lymphadenopathy:        Head (right side): No submental, no submandibular, no tonsillar, no preauricular and no posterior auricular adenopathy present.        Head (left side): No submental, no submandibular, no tonsillar, no preauricular and no posterior auricular adenopathy present.     She has no cervical adenopathy.        Right cervical: No superficial cervical and no posterior cervical adenopathy present.       Left cervical: No superficial cervical and no posterior cervical adenopathy present.   Neurological: She is alert and oriented to person, place, and time.  She has normal strength. Coordination and gait normal.   Skin: Skin is warm, dry and intact. No lesion and no rash noted. She is not diaphoretic. No cyanosis. No pallor.   Psychiatric: She has a normal mood and affect. Her speech is normal and behavior is normal. Judgment and thought content normal. Cognition and memory are normal.   Nursing note and vitals reviewed.      Assessment:     Petechiae on right soft palate    Anticoagulation state  Plan:     Discussed bristles of toothbrush are made for hard porcelain-like enamel, not for the delicate, friable mucus membranes. Bristles are excessively abrasive to mucus membranes and should not be used to clean.     Reassured nothing to biopsy. Return to ENT for any non-healing ulcer in oral cavity, and as needed for any ENT symptom or concern.

## 2018-07-12 LAB — BACTERIA SPEC AEROBE CULT: NORMAL

## 2018-07-13 ENCOUNTER — TELEPHONE (OUTPATIENT)
Dept: FAMILY MEDICINE | Facility: CLINIC | Age: 68
End: 2018-07-13

## 2018-07-13 NOTE — TELEPHONE ENCOUNTER
----- Message from Linda King sent at 7/13/2018 10:06 AM CDT -----  Contact: Kiana Morse)  Calling in regards to paperwork for Dexacan to be scheduled and trying to get it completed before September 3rd and please advise  [phone] and ref # [id number] and fax # [phone]

## 2018-07-26 ENCOUNTER — TELEPHONE (OUTPATIENT)
Dept: FAMILY MEDICINE | Facility: CLINIC | Age: 68
End: 2018-07-26

## 2018-07-26 NOTE — TELEPHONE ENCOUNTER
----- Message from Prema Krueger sent at 7/26/2018 11:25 AM CDT -----  Contact: marta/janae  Caller needs status of bone density scan [phone]

## 2018-08-03 ENCOUNTER — TELEPHONE (OUTPATIENT)
Dept: FAMILY MEDICINE | Facility: CLINIC | Age: 68
End: 2018-08-03

## 2018-08-03 DIAGNOSIS — Z78.0 POST-MENOPAUSAL: ICD-10-CM

## 2018-08-03 DIAGNOSIS — Z87.81 HISTORY OF FRACTURE: ICD-10-CM

## 2018-08-03 DIAGNOSIS — Z87.81 HISTORY OF RADIUS FRACTURE: ICD-10-CM

## 2018-08-03 DIAGNOSIS — M85.80 OSTEOPENIA, UNSPECIFIED LOCATION: Primary | ICD-10-CM

## 2018-08-03 DIAGNOSIS — M80.00XA OSTEOPOROSIS WITH CURRENT PATHOLOGICAL FRACTURE, UNSPECIFIED OSTEOPOROSIS TYPE, INITIAL ENCOUNTER: ICD-10-CM

## 2018-08-03 DIAGNOSIS — Z13.820 SCREENING FOR OSTEOPOROSIS: ICD-10-CM

## 2018-08-03 NOTE — TELEPHONE ENCOUNTER
----- Message from Greta Dorantes sent at 8/3/2018  2:35 PM CDT -----  Contact: brock  called rg status of diagnosis verification form pprwork faxed in July, pls respond asap. needs to have testing done (duplicate msg)..[phone] (ref [id number])

## 2018-08-03 NOTE — TELEPHONE ENCOUNTER
Spoke with Coretta at [facility], states she sent paperwork for patient to have Dexa Scan before 9/2018, order pended

## 2018-08-08 ENCOUNTER — HOSPITAL ENCOUNTER (OUTPATIENT)
Dept: RADIOLOGY | Facility: HOSPITAL | Age: 68
Discharge: HOME | End: 2018-08-08
Attending: FAMILY MEDICINE
Payer: MEDICARE

## 2018-08-08 DIAGNOSIS — Z78.0 POST-MENOPAUSAL: ICD-10-CM

## 2018-08-08 PROCEDURE — 77080 DXA BONE DENSITY AXIAL: CPT | Mod: TC,PO

## 2018-08-08 PROCEDURE — 77080 DXA BONE DENSITY AXIAL: CPT | Mod: 26,,, | Performed by: RADIOLOGY

## 2018-08-09 ENCOUNTER — TELEPHONE (OUTPATIENT)
Dept: FAMILY MEDICINE | Facility: CLINIC | Age: 68
End: 2018-08-09

## 2018-08-09 ENCOUNTER — LAB VISIT (OUTPATIENT)
Dept: LAB | Facility: HOSPITAL | Age: 68
End: 2018-08-09
Attending: FAMILY MEDICINE
Payer: MEDICARE

## 2018-08-09 DIAGNOSIS — M85.80 OSTEOPENIA, UNSPECIFIED LOCATION: ICD-10-CM

## 2018-08-09 DIAGNOSIS — M85.80 OSTEOPENIA, UNSPECIFIED LOCATION: Primary | ICD-10-CM

## 2018-08-09 LAB
25(OH)D3+25(OH)D2 SERPL-MCNC: 26 NG/ML
ALBUMIN SERPL BCP-MCNC: 4 G/DL
ALP SERPL-CCNC: 126 U/L
ALT SERPL W/O P-5'-P-CCNC: 22 U/L
ANION GAP SERPL CALC-SCNC: 8 MMOL/L
AST SERPL-CCNC: 22 U/L
BILIRUB SERPL-MCNC: 0.6 MG/DL
BUN SERPL-MCNC: 13 MG/DL
CALCIUM SERPL-MCNC: 9.7 MG/DL
CHLORIDE SERPL-SCNC: 107 MMOL/L
CO2 SERPL-SCNC: 24 MMOL/L
CREAT SERPL-MCNC: 0.7 MG/DL
EST. GFR  (AFRICAN AMERICAN): >60 ML/MIN/1.73 M^2
EST. GFR  (NON AFRICAN AMERICAN): >60 ML/MIN/1.73 M^2
GLUCOSE SERPL-MCNC: 102 MG/DL
POTASSIUM SERPL-SCNC: 4.4 MMOL/L
PROT SERPL-MCNC: 7.5 G/DL
SODIUM SERPL-SCNC: 139 MMOL/L

## 2018-08-09 PROCEDURE — 80053 COMPREHEN METABOLIC PANEL: CPT

## 2018-08-09 PROCEDURE — 82306 VITAMIN D 25 HYDROXY: CPT

## 2018-08-09 PROCEDURE — 36415 COLL VENOUS BLD VENIPUNCTURE: CPT | Mod: PO

## 2018-08-10 ENCOUNTER — TELEPHONE (OUTPATIENT)
Dept: FAMILY MEDICINE | Facility: CLINIC | Age: 68
End: 2018-08-10

## 2018-08-10 DIAGNOSIS — E55.9 VITAMIN D DEFICIENCY: Primary | ICD-10-CM

## 2018-09-25 ENCOUNTER — TELEPHONE (OUTPATIENT)
Dept: CARDIOLOGY | Facility: CLINIC | Age: 68
End: 2018-09-25

## 2018-09-25 ENCOUNTER — TELEPHONE (OUTPATIENT)
Dept: FAMILY MEDICINE | Facility: CLINIC | Age: 68
End: 2018-09-25

## 2018-09-25 DIAGNOSIS — I25.10 ATHEROSCLEROSIS OF CORONARY ARTERY, ANGINA PRESENCE UNSPECIFIED, UNSPECIFIED VESSEL OR LESION TYPE, UNSPECIFIED WHETHER NATIVE OR TRANSPLANTED HEART: Primary | ICD-10-CM

## 2018-09-25 NOTE — TELEPHONE ENCOUNTER
----- Message from Jevon Almendarez sent at 9/25/2018  2:45 PM CDT -----  Type:  Sooner Apoointment Request    Caller is requesting a sooner appointment.  Caller declined first available appointment listed below.  Caller will not accept being placed on the waitlist and is requesting a message be sent to doctor.    Name of Caller:  Self When is the first available appointment?  NA Symptoms: NA   Best Call Back Number: [phone] Additional Information:  Patient need to schedule stress test prior to seeing the doctor in November. Appointment is 11/14/2018

## 2018-09-25 NOTE — TELEPHONE ENCOUNTER
----- Message from Tiffany Kilgore sent at 9/25/2018  2:42 PM CDT -----  Contact: Patient  Patient is requesting copies of her bone scan report and the labs on 7/1018 and 08/09/18, she would like to have them mailed to her. Please call patient back if needed at [phone]. Thank you

## 2018-10-29 ENCOUNTER — TELEPHONE (OUTPATIENT)
Dept: FAMILY MEDICINE | Facility: CLINIC | Age: 68
End: 2018-10-29

## 2018-10-29 DIAGNOSIS — K21.9 GASTROESOPHAGEAL REFLUX DISEASE, ESOPHAGITIS PRESENCE NOT SPECIFIED: Primary | ICD-10-CM

## 2018-10-29 NOTE — TELEPHONE ENCOUNTER
----- Message from Cam Pickett sent at 10/29/2018  1:05 PM CDT -----  Contact: self [phone]  Would like to consult with nurse regarding referral for upper GI. Please call back at [phone].  Md Johanna

## 2018-10-29 NOTE — TELEPHONE ENCOUNTER
Pt states she has been having a lot of trouble again with indigestion, her voice is hoarse and has been taking a lot of tums. Asking if you would be ok with ordering her another EGD or does she need to come in?     Would like to have done in [place]

## 2018-11-01 ENCOUNTER — TELEPHONE (OUTPATIENT)
Dept: GASTROENTEROLOGY | Facility: CLINIC | Age: 68
End: 2018-11-01

## 2018-11-01 NOTE — TELEPHONE ENCOUNTER
----- Message from Quin Zafar sent at 11/1/2018  2:32 PM CDT -----  Patient has been referred by Dr. Badillo, please contact to schedule at [phone].    Thank you

## 2018-11-05 ENCOUNTER — CLINICAL SUPPORT (OUTPATIENT)
Dept: CARDIOLOGY | Facility: CLINIC | Age: 68
End: 2018-11-05
Attending: INTERNAL MEDICINE
Payer: MEDICARE

## 2018-11-05 ENCOUNTER — TELEPHONE (OUTPATIENT)
Dept: FAMILY MEDICINE | Facility: CLINIC | Age: 68
End: 2018-11-05

## 2018-11-05 VITALS — WEIGHT: 175 LBS | HEIGHT: 62 IN | BODY MASS INDEX: 32.2 KG/M2

## 2018-11-05 DIAGNOSIS — I25.10 ATHEROSCLEROSIS OF CORONARY ARTERY, ANGINA PRESENCE UNSPECIFIED, UNSPECIFIED VESSEL OR LESION TYPE, UNSPECIFIED WHETHER NATIVE OR TRANSPLANTED HEART: ICD-10-CM

## 2018-11-05 PROCEDURE — 99999 PR PBB SHADOW E&M-EST. PATIENT-LVL I: CPT | Mod: PBBFAC,HCWC,,

## 2018-11-05 PROCEDURE — 93351 STRESS TTE COMPLETE: CPT | Mod: HCWC,S$GLB,, | Performed by: INTERNAL MEDICINE

## 2018-11-05 RX ORDER — SUCRALFATE 1 G/1
TABLET ORAL
Refills: 0 | COMMUNITY
Start: 2018-10-28 | End: 2019-05-20

## 2018-11-05 NOTE — TELEPHONE ENCOUNTER
Informed pt that normally the provider doesn't refill a medication unless they are seen by that provider. Pt states she is fine with that and will complete the medication that she has, she also has an upper GI scheduled and will f/u if she has additional issue.

## 2018-11-05 NOTE — TELEPHONE ENCOUNTER
----- Message from Cam Piyush sent at 11/5/2018 12:10 PM CST -----  Contact: self [phone]  Would like to have prescription for Sucralfate 1mg sent to pharmacy.  Please call back at [phone].  Md Johanna            Pt uses.  [facility] - [place], [place] - [address]  [address] [address]  Phone: [phone] Fax: [phone]

## 2018-11-06 LAB
ASCENDING AORTA: 3.01 CM
AV MEAN GRADIENT: 2.77 MMHG
AV PEAK GRADIENT: 5.17 MMHG
AV VALVE AREA: 3.02 CM2
BSA FOR ECHO PROCEDURE: 1.86 M2
CV ECHO LV RWT: 0.32 CM
CV STRESS BASE HR: 77
DIASTOLIC BLOOD PRESSURE: 66
DOP CALC AO PEAK VEL: 1.14 M/S
DOP CALC AO VTI: 25.06 CM
DOP CALC LVOT AREA: 3.46 CM2
DOP CALC LVOT DIAMETER: 2.1 CM
DOP CALC LVOT STROKE VOLUME: 75.61 CM3
DOP CALCLVOT PEAK VEL VTI: 21.84 CM
E WAVE DECELERATION TIME: 180.04 MSEC
E/A RATIO: 0.78
E/E' RATIO: 10.59
ECHO LV POSTERIOR WALL: 0.71 CM (ref 0.6–1.1)
FRACTIONAL SHORTENING: 33 % (ref 28–44)
INTERVENTRICULAR SEPTUM: 0.86 CM (ref 0.6–1.1)
IVRT: 0.07 MSEC
LA MAJOR: 4.35 CM
LA MINOR: 4.61 CM
LA WIDTH: 3.55 CM
LEFT ATRIUM SIZE: 3.05 CM
LEFT ATRIUM VOLUME INDEX: 22.1 ML/M2
LEFT ATRIUM VOLUME: 41.2 CM3
LEFT INTERNAL DIMENSION IN SYSTOLE: 2.92 CM (ref 2.1–4)
LEFT VENTRICLE DIASTOLIC VOLUME INDEX: 46.89 ML/M2
LEFT VENTRICLE DIASTOLIC VOLUME: 87.22 ML
LEFT VENTRICLE MASS INDEX: 57.2 G/M2
LEFT VENTRICLE SYSTOLIC VOLUME INDEX: 17.6 ML/M2
LEFT VENTRICLE SYSTOLIC VOLUME: 32.75 ML
LEFT VENTRICULAR INTERNAL DIMENSION IN DIASTOLE: 4.39 CM (ref 3.5–6)
LEFT VENTRICULAR MASS: 106.35 G
LV LATERAL E/E' RATIO: 7.5
LV SEPTAL E/E' RATIO: 18
MV PEAK A VEL: 1.15 M/S
MV PEAK E VEL: 0.9 M/S
MV STENOSIS PRESSURE HALF TIME: 52.21 MS
MV VALVE AREA P 1/2 METHOD: 4.21 CM2
OHS CV CPX 1 MINUTE RECOVERY HEART RATE: 125 BPM
OHS CV CPX 85 PERCENT MAX PREDICTED HEART RATE MALE: 123
OHS CV CPX ESTIMATED METS: 9
OHS CV CPX MAX PREDICTED HEART RATE: 144
OHS CV CPX PATIENT IS FEMALE: 1
OHS CV CPX PATIENT IS MALE: 0
OHS CV CPX PEAK DIASTOLIC BLOOD PRESSURE: 62 MMHG
OHS CV CPX PEAK HEAR RATE: 160
OHS CV CPX PEAK RATE PRESSURE PRODUCT: NORMAL
OHS CV CPX PEAK SYSTOLIC BLOOD PRESSURE: 156
OHS CV CPX PERCENT MAX PREDICTED HEART RATE ACHIEVED: 111
OHS CV CPX PERCENT TARGET HEART RATE ACHIEVED: 130.08
OHS CV CPX RATE PRESSURE PRODUCT PRESENTING: NORMAL
OHS CV CPX TARGET HEART RATE: 123
PULM VEIN S/D RATIO: 1.2
PV PEAK D VEL: 0.46 M/S
PV PEAK S VEL: 0.55 M/S
RA MAJOR: 4.18 CM
RA WIDTH: 3.05 CM
RETIRED EF AND QEF - SEE NOTES: 62.45 %
SINUS: 2.85 CM
STJ: 2.73 CM
STRESS ECHO POST EXERCISE DUR MIN: 5 MIN
STRESS ECHO POST EXERCISE DUR SEC: 30
SYSTOLIC BLOOD PRESSURE: 132
TDI LATERAL: 0.12
TDI SEPTAL: 0.05
TDI: 0.09

## 2018-11-14 ENCOUNTER — OFFICE VISIT (OUTPATIENT)
Dept: CARDIOLOGY | Facility: CLINIC | Age: 68
End: 2018-11-14
Payer: MEDICARE

## 2018-11-14 VITALS
HEART RATE: 73 BPM | DIASTOLIC BLOOD PRESSURE: 78 MMHG | BODY MASS INDEX: 31.97 KG/M2 | SYSTOLIC BLOOD PRESSURE: 125 MMHG | WEIGHT: 173.75 LBS | HEIGHT: 62 IN

## 2018-11-14 DIAGNOSIS — E78.5 DYSLIPIDEMIA: ICD-10-CM

## 2018-11-14 DIAGNOSIS — I25.118 ATHEROSCLEROSIS OF NATIVE CORONARY ARTERY WITH OTHER FORM OF ANGINA PECTORIS, UNSPECIFIED WHETHER NATIVE OR TRANSPLANTED HEART: Primary | ICD-10-CM

## 2018-11-14 PROCEDURE — 1100F PTFALLS ASSESS-DOCD GE2>/YR: CPT | Mod: CPTII,HCWC,S$GLB, | Performed by: INTERNAL MEDICINE

## 2018-11-14 PROCEDURE — 99499 UNLISTED E&M SERVICE: CPT | Mod: S$GLB,,, | Performed by: INTERNAL MEDICINE

## 2018-11-14 PROCEDURE — 3288F FALL RISK ASSESSMENT DOCD: CPT | Mod: CPTII,HCWC,S$GLB, | Performed by: INTERNAL MEDICINE

## 2018-11-14 PROCEDURE — 99214 OFFICE O/P EST MOD 30 MIN: CPT | Mod: HCWC,S$GLB,, | Performed by: INTERNAL MEDICINE

## 2018-11-14 PROCEDURE — 99999 PR PBB SHADOW E&M-EST. PATIENT-LVL III: CPT | Mod: PBBFAC,HCWC,, | Performed by: INTERNAL MEDICINE

## 2018-11-14 NOTE — PROGRESS NOTES
Subjective:    Patient ID:  Flores Adamson is a 68 y.o. female who presents for follow-up of cad    HPI  She comes with no complaints, no chest pain, no shortness of breath  FC I-II    Review of Systems   Constitution: Negative for decreased appetite, weakness, malaise/fatigue, weight gain and weight loss.   Cardiovascular: Negative for chest pain, dyspnea on exertion, leg swelling, palpitations and syncope.   Respiratory: Negative for cough and shortness of breath.    Gastrointestinal: Negative.    All other systems reviewed and are negative.       Objective:      Physical Exam   Constitutional: She is oriented to person, place, and time. She appears well-developed and well-nourished.   HENT:   Head: Normocephalic.   Eyes: Pupils are equal, round, and reactive to light.   Neck: Normal range of motion. Neck supple. No JVD present. Carotid bruit is not present. No thyromegaly present.   Cardiovascular: Normal rate, regular rhythm, normal heart sounds, intact distal pulses and normal pulses. PMI is not displaced. Exam reveals no gallop.   No murmur heard.  Pulmonary/Chest: Effort normal and breath sounds normal.   Abdominal: Soft. Normal appearance. She exhibits no mass. There is no hepatosplenomegaly. There is no tenderness.   Musculoskeletal: Normal range of motion. She exhibits no edema.   Neurological: She is alert and oriented to person, place, and time. She has normal strength and normal reflexes. No sensory deficit.   Skin: Skin is warm and intact.   Psychiatric: She has a normal mood and affect.   Nursing note and vitals reviewed.        Assessment:       1. Atherosclerosis of native coronary artery with other form of angina pectoris, unspecified whether native or transplanted heart    2. Dyslipidemia         Plan:     Continue all cardiac medications  Regular exercise program  Weight loss  1 yr f/u

## 2018-11-28 ENCOUNTER — ANESTHESIA EVENT (OUTPATIENT)
Dept: ENDOSCOPY | Facility: HOSPITAL | Age: 68
End: 2018-11-28
Payer: MEDICARE

## 2018-11-28 ENCOUNTER — ANESTHESIA (OUTPATIENT)
Dept: ENDOSCOPY | Facility: HOSPITAL | Age: 68
End: 2018-11-28
Payer: MEDICARE

## 2018-11-28 ENCOUNTER — HOSPITAL ENCOUNTER (OUTPATIENT)
Facility: HOSPITAL | Age: 68
Discharge: HOME | End: 2018-11-28
Attending: INTERNAL MEDICINE | Admitting: INTERNAL MEDICINE
Payer: MEDICARE

## 2018-11-28 VITALS
RESPIRATION RATE: 18 BRPM | BODY MASS INDEX: 30.73 KG/M2 | OXYGEN SATURATION: 98 % | DIASTOLIC BLOOD PRESSURE: 58 MMHG | SYSTOLIC BLOOD PRESSURE: 122 MMHG | TEMPERATURE: 97 F | HEART RATE: 72 BPM | WEIGHT: 167 LBS | HEIGHT: 62 IN

## 2018-11-28 DIAGNOSIS — K21.9 GERD (GASTROESOPHAGEAL REFLUX DISEASE): ICD-10-CM

## 2018-11-28 PROCEDURE — 88305 TISSUE EXAM BY PATHOLOGIST: CPT | Performed by: PATHOLOGY

## 2018-11-28 PROCEDURE — 37000008 HC ANESTHESIA 1ST 15 MINUTES: Mod: HCWC,PO | Performed by: INTERNAL MEDICINE

## 2018-11-28 PROCEDURE — 43239 EGD BIOPSY SINGLE/MULTIPLE: CPT | Mod: HCWC,PO | Performed by: INTERNAL MEDICINE

## 2018-11-28 PROCEDURE — 88305 TISSUE EXAM BY PATHOLOGIST: CPT | Mod: 26,,, | Performed by: PATHOLOGY

## 2018-11-28 PROCEDURE — 25000003 PHARM REV CODE 250: Mod: HCWC,PO | Performed by: INTERNAL MEDICINE

## 2018-11-28 PROCEDURE — 27201012 HC FORCEPS, HOT/COLD, DISP: Mod: HCWC,PO | Performed by: INTERNAL MEDICINE

## 2018-11-28 PROCEDURE — 43239 EGD BIOPSY SINGLE/MULTIPLE: CPT | Mod: HCWC,,, | Performed by: INTERNAL MEDICINE

## 2018-11-28 PROCEDURE — D9220A PRA ANESTHESIA: Mod: HCWC,ANES,, | Performed by: ANESTHESIOLOGY

## 2018-11-28 PROCEDURE — 37000009 HC ANESTHESIA EA ADD 15 MINS: Mod: HCWC,PO | Performed by: INTERNAL MEDICINE

## 2018-11-28 PROCEDURE — 63600175 PHARM REV CODE 636 W HCPCS: Mod: HCWC,PO | Performed by: NURSE ANESTHETIST, CERTIFIED REGISTERED

## 2018-11-28 RX ORDER — LIDOCAINE HCL/PF 100 MG/5ML
SYRINGE (ML) INTRAVENOUS
Status: DISCONTINUED | OUTPATIENT
Start: 2018-11-28 | End: 2018-11-28

## 2018-11-28 RX ORDER — SODIUM CHLORIDE 0.9 % (FLUSH) 0.9 %
3 SYRINGE (ML) INJECTION
Status: DISCONTINUED | OUTPATIENT
Start: 2018-11-28 | End: 2018-11-28 | Stop reason: HOSPADM

## 2018-11-28 RX ORDER — PROPOFOL 10 MG/ML
VIAL (ML) INTRAVENOUS
Status: DISCONTINUED | OUTPATIENT
Start: 2018-11-28 | End: 2018-11-28

## 2018-11-28 RX ORDER — SODIUM CHLORIDE, SODIUM LACTATE, POTASSIUM CHLORIDE, CALCIUM CHLORIDE 600; 310; 30; 20 MG/100ML; MG/100ML; MG/100ML; MG/100ML
INJECTION, SOLUTION INTRAVENOUS CONTINUOUS
Status: DISCONTINUED | OUTPATIENT
Start: 2018-11-28 | End: 2018-11-28 | Stop reason: HOSPADM

## 2018-11-28 RX ADMIN — PROPOFOL 50 MG: 10 INJECTION, EMULSION INTRAVENOUS at 10:11

## 2018-11-28 RX ADMIN — PROPOFOL 125 MG: 10 INJECTION, EMULSION INTRAVENOUS at 10:11

## 2018-11-28 RX ADMIN — SODIUM CHLORIDE, SODIUM LACTATE, POTASSIUM CHLORIDE, AND CALCIUM CHLORIDE: .6; .31; .03; .02 INJECTION, SOLUTION INTRAVENOUS at 09:11

## 2018-11-28 RX ADMIN — LIDOCAINE HYDROCHLORIDE 75 MG: 20 INJECTION, SOLUTION INTRAVENOUS at 10:11

## 2018-11-28 NOTE — H&P
History & Physical - Short Stay  Gastroenterology      SUBJECTIVE:     Procedure: EGD    Chief Complaint/Indication for Procedure: Reflux    PTA Medications   Medication Sig    AFLURIA [id number], PF, 45 mcg (15 mcg x 3)/0.5 mL Syrg inject 0.5 milliliter intramuscularly    aspirin (ECOTRIN) 81 MG EC tablet Take 81 mg by mouth once a week.     atorvastatin (LIPITOR) 40 MG tablet TAKE 1 TABLET ONE TIME DAILY    NEXIUM 40 mg capsule take 1 capsule by mouth twice a day before MEALS    nutritional supplements Liqd Take by mouth once daily.     sucralfate (CARAFATE) 1 gram tablet TK 1 T PO QID       Review of patient's allergies indicates:   Allergen Reactions    Sulfa (sulfonamide antibiotics) Shortness Of Breath        Past Medical History:   Diagnosis Date    Anticoagulant long-term use     CAD (coronary artery disease)     with stents x 3    GERD (gastroesophageal reflux disease)      Past Surgical History:   Procedure Laterality Date    2006 2 stents      2009 1 stent      COLONOSCOPY N/A 8/3/2015    Performed by [facility] at [facility]    EGD (ESOPHAGOGASTRODUODENOSCOPY) N/A 4/29/2014    Performed by [facility] at [facility] END[facility]    HEART CATH-LEFT N/A 1/15/2015    Performed by Reji Hoang MD at [facility] CATH LAB    WRIST FRACTURE SURGERY  03/2018     Family History   Problem Relation Age of Onset    Heart disease Father      Social History     Tobacco Use    Smoking status: Never Smoker    Smokeless tobacco: Never Used   Substance Use Topics    Alcohol use: No    Drug use: No         OBJECTIVE:     Vital Signs (Most Recent)  Temp: 97.3 °F (36.3 °C) (11/28/18 0908)  Pulse: 66 (11/28/18 0908)  Resp: 16 (11/28/18 0908)  BP: (!) 159/79 (11/28/18 0908)  SpO2: 100 % (11/28/18 0908)    Physical Exam:                                                       GENERAL:  Comfortable, in no acute distress.                                 HEENT EXAM:  Nonicteric.  No adenopathy.   Oropharynx is clear.               NECK:  Supple.                                                               LUNGS:  Clear.                                                               CARDIAC:  Regular rate and rhythm.  S1, S2.  No murmur.                      ABDOMEN:  Soft, positive bowel sounds, nontender.  No hepatosplenomegaly or masses.  No rebound or guarding.                                             EXTREMITIES:  No edema.     MENTAL STATUS:  Normal, alert and oriented.      ASSESSMENT/PLAN:     Assessment: Reflux    Plan: EGD    Anesthesia Plan: General    ASA Grade: ASA 2 - Patient with mild systemic disease with no functional limitations    MALLAMPATI SCORE:  I (soft palate, uvula, fauces, and tonsillar pillars visible)

## 2018-11-28 NOTE — ANESTHESIA PREPROCEDURE EVALUATION
11/28/2018  Flores Adamson is a 68 y.o., female.    Anesthesia Evaluation      I have reviewed the Medications.     Review of Systems  Anesthesia Hx:  No problems with previous Anesthesia   Social:  Non-Smoker, No Alcohol Use    Cardiovascular:   CAD  CABG/stent (stents 2009)  hyperlipidemia    Pulmonary:  Pulmonary Normal    Renal/:  Renal/ Normal     Hepatic/GI:   GERD    Neurological:  Neurology Normal    Endocrine:  Endocrine Normal        Physical Exam  General:  Obesity    Airway/Jaw/Neck:  Airway Findings: Mouth Opening: Normal General Airway Assessment: Adult  Mallampati: II  Jaw/Neck Findings:  Neck ROM: Extension Decreased, Mild       Chest/Lungs:  Chest/Lungs Findings: Clear to auscultation, Normal Respiratory Rate     Heart/Vascular:  Heart Findings: Rate: Normal  Rhythm: Regular Rhythm  Sounds: Normal  Heart murmur: negative Vascular Findings: Normal (No carotid bruits.)       Mental Status:  Mental Status Findings:  Cooperative, Alert and Oriented         Anesthesia Plan  Type of Anesthesia, risks & benefits discussed:  Anesthesia Type:  general  Patient's Preference:   Intra-op Monitoring Plan:   Intra-op Monitoring Plan Comments:   Post Op Pain Control Plan:   Post Op Pain Control Plan Comments:   Induction:   IV  Beta Blocker:  Patient is not currently on a Beta-Blocker (No further documentation required).       Informed Consent: Patient understands risks and agrees with Anesthesia plan.  Questions answered. Anesthesia consent signed with patient.  ASA Score: 3     Day of Surgery Review of History & Physical:            Ready For Surgery From Anesthesia Perspective.

## 2018-11-28 NOTE — TRANSFER OF CARE
"Anesthesia Transfer of Care Note    Patient: Flores Adamson    Procedure(s) Performed: Procedure(s) (LRB):  EGD (ESOPHAGOGASTRODUODENOSCOPY) (N/A)    Patient location: PACU    Anesthesia Type: general    Transport from OR: Transported from OR on room air with adequate spontaneous ventilation    Post pain: adequate analgesia    Post assessment: no apparent anesthetic complications and tolerated procedure well    Post vital signs: stable    Level of consciousness: awake    Nausea/Vomiting: no nausea/vomiting    Complications: none    Transfer of care protocol was followed      Last vitals:   Visit Vitals  BP (!) 159/79 (BP Location: Right arm, Patient Position: Lying)   Pulse 66   Temp 36.3 °C (97.3 °F) (Skin)   Resp 16   Ht 5' 2" (1.575 m)   Wt 75.8 kg (167 lb)   SpO2 100%   Breastfeeding? No   BMI 30.54 kg/m²     "

## 2018-11-28 NOTE — DISCHARGE SUMMARY
Discharge Note  Short Stay      SUMMARY     Admit Date: 11/28/2018    Attending Physician: Marvin Alvarado MD     Discharge Physician: Marvin Alvarado MD    Discharge Date: 11/28/2018 10:22 AM    Final Diagnosis: Gastroesophageal reflux disease, esophagitis presence not specified [K21.9]    Disposition: HOME OR SELF CARE    Patient Instructions:   Current Discharge Medication List      CONTINUE these medications which have NOT CHANGED    Details   AFLURIA [id number], PF, 45 mcg (15 mcg x 3)/0.5 mL Syrg inject 0.5 milliliter intramuscularly  Refills: 0      aspirin (ECOTRIN) 81 MG EC tablet Take 81 mg by mouth once a week.       atorvastatin (LIPITOR) 40 MG tablet TAKE 1 TABLET ONE TIME DAILY  Qty: 90 tablet, Refills: 3      NEXIUM 40 mg capsule take 1 capsule by mouth twice a day before MEALS  Qty: 60 capsule, Refills: 11      nutritional supplements Liqd Take by mouth once daily.       sucralfate (CARAFATE) 1 gram tablet TK 1 T PO QID  Refills: 0             Discharge Procedure Orders (must include Diet, Follow-up, Activity)    Follow Up:  Follow up with PCP as previously scheduled  Resume routine diet.  Activity as tolerated.    No driving day of procedure.

## 2018-11-28 NOTE — ANESTHESIA POSTPROCEDURE EVALUATION
"Anesthesia Post Evaluation    Patient: Flores Adamson    Procedure(s) Performed: Procedure(s) (LRB):  EGD (ESOPHAGOGASTRODUODENOSCOPY) (N/A)    Final Anesthesia Type: general  Patient location during evaluation: PACU  Patient participation: Yes- Able to Participate  Level of consciousness: awake and alert  Post-procedure vital signs: reviewed and stable  Pain management: adequate  Airway patency: patent  PONV status at discharge: No PONV  Anesthetic complications: no      Cardiovascular status: hemodynamically stable and blood pressure returned to baseline  Respiratory status: unassisted, spontaneous ventilation and room air  Hydration status: euvolemic  Follow-up not needed.        Visit Vitals  BP (!) 122/58 (BP Location: Left arm, Patient Position: Sitting)   Pulse 72   Temp 36.1 °C (97 °F) (Skin)   Resp 18   Ht 5' 2" (1.575 m)   Wt 75.8 kg (167 lb)   SpO2 98%   Breastfeeding? No   BMI 30.54 kg/m²       Pain/Gianfranco Score: Pain Assessment Performed: Yes (11/28/2018 10:43 AM)  Presence of Pain: denies (11/28/2018 10:43 AM)  Gianfranco Score: 10 (11/28/2018 10:43 AM)        "

## 2018-11-28 NOTE — [employer] PATIENT INSTRUCTIONS
Discharge Summary/Instructions after an Endoscopic Procedure  Patient Name: Flores Adamson  Patient MRN: [id number]  Patient YOB: 1950  Wednesday, November 28, 2018  Marvin Alvarado MD  RESTRICTIONS:  During your procedure today, you received medications for sedation.  These   medications may affect your judgment, balance and coordination.  Therefore,   for 24 hours, you have the following restrictions:   - DO NOT drive a car, operate machinery, make legal/financial decisions,   sign important papers or drink alcohol.    ACTIVITY:  Today: no heavy lifting, straining or running due to procedural   sedation/anesthesia.  The following day: return to full activity including work.  DIET:  Eat and drink normally unless instructed otherwise.     TREATMENT FOR COMMON SIDE EFFECTS:  - Mild abdominal pain, nausea, belching, bloating or excessive gas:  rest,   eat lightly and use a heating pad.  - Sore Throat: treat with throat lozenges and/or gargle with warm salt   water.  - Because air was used during the procedure, expelling large amounts of air   from your rectum or belching is normal.  - If a bowel prep was taken, you may not have a bowel movement for 1-3 days.    This is normal.  SYMPTOMS TO WATCH FOR AND REPORT TO YOUR PHYSICIAN:  1. Abdominal pain or bloating, other than gas cramps.  2. Chest pain.  3. Back pain.  4. Signs of infection such as: chills or fever occurring within 24 hours   after the procedure.  5. Rectal bleeding, which would show as bright red, maroon, or black stools.   (A tablespoon of blood from the rectum is not serious, especially if   hemorrhoids are present.)  6. Vomiting.  7. Weakness or dizziness.  GO DIRECTLY TO THE NEAREST EMERGENCY ROOM IF YOU HAVE ANY OF THE FOLLOWING:      Difficulty breathing              Chills and/or fever over 101 F   Persistent vomiting and/or vomiting blood   Severe abdominal pain   Severe chest pain   Black, tarry stools   Bleeding- more than one  tablespoon   Any other symptom or condition that you feel may need urgent attention  Your doctor recommends these additional instructions:  If any biopsies were taken, your doctors clinic will contact you in 1 to 2   weeks with any results.  We are waiting for your pathology results.   Continue your present medications.   You are being discharged to home.  For questions, problems or results please call your physician - Marvin Alvarado MD at Work: [phone].  EMERGENCY PHONE NUMBER: [phone], LAB RESULTS: [phone]  IF A COMPLICATION OR EMERGENCY SITUATION ARISES AND YOU ARE UNABLE TO REACH   YOUR PHYSICIAN - GO DIRECTLY TO THE EMERGENCY ROOM.  ___________________________________________  Nurse Signature  ___________________________________________  Patient/Designated Responsible Party Signature  Marvin Alvarado MD  11/28/2018 10:21:50 AM  This report has been verified and signed electronically.  [employer]

## 2018-11-28 NOTE — DISCHARGE INSTRUCTIONS

## 2018-12-03 ENCOUNTER — PATIENT OUTREACH (OUTPATIENT)
Dept: ADMINISTRATIVE | Facility: HOSPITAL | Age: 68
End: 2018-12-03

## 2018-12-17 ENCOUNTER — TELEPHONE (OUTPATIENT)
Dept: FAMILY MEDICINE | Facility: CLINIC | Age: 68
End: 2018-12-17

## 2018-12-17 NOTE — TELEPHONE ENCOUNTER
It showed inflammation of the esophagus but the biopsies did not show any sign of cancer or pre cancer

## 2018-12-17 NOTE — TELEPHONE ENCOUNTER
----- Message from Javier Hutson sent at 12/17/2018  2:15 PM CST -----  Contact: Pt.   Pt is calling regarding requesting to have nurse call pt. Pt is requesting the results of the upper GI test. .[phone] (home)       Thank You   Kely Hutson

## 2019-02-11 RX ORDER — ATORVASTATIN CALCIUM 40 MG/1
40 TABLET, FILM COATED ORAL DAILY
Qty: 90 TABLET | Refills: 3 | Status: SHIPPED | OUTPATIENT
Start: 2019-02-11 | End: 2020-07-16 | Stop reason: SDUPTHER

## 2019-02-11 NOTE — TELEPHONE ENCOUNTER
----- Message from Ankit Baker sent at 2/11/2019  4:09 PM CST -----  Contact: Patient  Type: Needs Medical Advice    Who Called:  Patient  Best Call Back Number: [phone]  Additional Information: Patient switched to [employer] and needs new prescriptions to be called in so it can be filled by mail order. Fax to [phone] Phone: [phone]    atorvastatin (LIPITOR) 40 MG tablet - x1 daily

## 2019-03-13 ENCOUNTER — LAB VISIT (OUTPATIENT)
Dept: LAB | Facility: HOSPITAL | Age: 69
End: 2019-03-13
Attending: FAMILY MEDICINE
Payer: MEDICARE

## 2019-03-13 DIAGNOSIS — E55.9 VITAMIN D DEFICIENCY: ICD-10-CM

## 2019-03-13 PROCEDURE — 82306 VITAMIN D 25 HYDROXY: CPT

## 2019-03-13 PROCEDURE — 36415 COLL VENOUS BLD VENIPUNCTURE: CPT | Mod: PO

## 2019-03-14 LAB — 25(OH)D3+25(OH)D2 SERPL-MCNC: 33 NG/ML

## 2019-05-20 ENCOUNTER — OFFICE VISIT (OUTPATIENT)
Dept: FAMILY MEDICINE | Facility: CLINIC | Age: 69
End: 2019-05-20
Payer: MEDICARE

## 2019-05-20 VITALS
WEIGHT: 184 LBS | SYSTOLIC BLOOD PRESSURE: 115 MMHG | DIASTOLIC BLOOD PRESSURE: 60 MMHG | BODY MASS INDEX: 33.86 KG/M2 | TEMPERATURE: 98 F | HEART RATE: 76 BPM | HEIGHT: 62 IN

## 2019-05-20 DIAGNOSIS — K21.9 GASTROESOPHAGEAL REFLUX DISEASE, ESOPHAGITIS PRESENCE NOT SPECIFIED: ICD-10-CM

## 2019-05-20 DIAGNOSIS — Z23 IMMUNIZATION DUE: ICD-10-CM

## 2019-05-20 DIAGNOSIS — I25.118 ATHEROSCLEROSIS OF NATIVE CORONARY ARTERY WITH OTHER FORM OF ANGINA PECTORIS, UNSPECIFIED WHETHER NATIVE OR TRANSPLANTED HEART: ICD-10-CM

## 2019-05-20 DIAGNOSIS — E78.5 DYSLIPIDEMIA: Primary | ICD-10-CM

## 2019-05-20 DIAGNOSIS — Z23 IMMUNIZATION DUE: Primary | ICD-10-CM

## 2019-05-20 PROCEDURE — G0009 PNEUMOCOCCAL POLYSACCHARIDE VACCINE 23-VALENT =>2YO SQ IM: ICD-10-PCS | Mod: S$GLB,,, | Performed by: NURSE PRACTITIONER

## 2019-05-20 PROCEDURE — G0009 ADMIN PNEUMOCOCCAL VACCINE: HCPCS | Mod: S$GLB,,, | Performed by: NURSE PRACTITIONER

## 2019-05-20 PROCEDURE — G0439 PPPS, SUBSEQ VISIT: HCPCS | Mod: S$GLB,,, | Performed by: NURSE PRACTITIONER

## 2019-05-20 PROCEDURE — 99999 PR PBB SHADOW E&M-EST. PATIENT-LVL III: ICD-10-PCS | Mod: PBBFAC,,, | Performed by: NURSE PRACTITIONER

## 2019-05-20 PROCEDURE — G0439 PR MEDICARE ANNUAL WELLNESS SUBSEQUENT VISIT: ICD-10-PCS | Mod: S$GLB,,, | Performed by: NURSE PRACTITIONER

## 2019-05-20 PROCEDURE — 90732 PPSV23 VACC 2 YRS+ SUBQ/IM: CPT | Mod: S$GLB,,, | Performed by: NURSE PRACTITIONER

## 2019-05-20 PROCEDURE — 90732 PNEUMOCOCCAL POLYSACCHARIDE VACCINE 23-VALENT =>2YO SQ IM: ICD-10-PCS | Mod: S$GLB,,, | Performed by: NURSE PRACTITIONER

## 2019-05-20 PROCEDURE — 99499 RISK ADDL DX/OHS AUDIT: ICD-10-PCS | Mod: S$GLB,,, | Performed by: NURSE PRACTITIONER

## 2019-05-20 PROCEDURE — 99499 UNLISTED E&M SERVICE: CPT | Mod: S$GLB,,, | Performed by: NURSE PRACTITIONER

## 2019-05-20 PROCEDURE — 99999 PR PBB SHADOW E&M-EST. PATIENT-LVL III: CPT | Mod: PBBFAC,,, | Performed by: NURSE PRACTITIONER

## 2019-05-20 RX ORDER — NITROGLYCERIN 0.4 MG/1
0.4 TABLET SUBLINGUAL
COMMUNITY
Start: 2016-02-04

## 2019-05-20 NOTE — PROGRESS NOTES
"Flores Adamson presented for a  Medicare AWV and comprehensive Health Risk Assessment today. The following components were reviewed and updated:    · Medical history  · Family History  · Social history  · Allergies and Current Medications  · Health Risk Assessment  · Health Maintenance  · Care Team     ** See Completed Assessments for Annual Wellness Visit within the encounter summary.**       The following assessments were completed:  · Living Situation  · CAGE  · Depression Screening  · Timed Get Up and Go  · Whisper Test  · Cognitive Function Screening  · Nutrition Screening  · ADL Screening  · PAQ Screening    Vitals:    05/20/19 1106   BP: 115/60   Pulse: 76   Temp: 98.2 °F (36.8 °C)   TempSrc: Oral   Weight: 83.5 kg (184 lb)   Height: 5' 2" (1.575 m)     Body mass index is 33.65 kg/m².  Physical Exam   Constitutional: She is oriented to person, place, and time. She appears well-developed and well-nourished. No distress.   HENT:   Head: Normocephalic and atraumatic.   Eyes: Pupils are equal, round, and reactive to light. EOM are normal.   Neck: Normal range of motion. Neck supple.   Cardiovascular: Normal rate and regular rhythm.   Pulmonary/Chest: Effort normal and breath sounds normal.   Musculoskeletal: Normal range of motion.   Neurological: She is alert and oriented to person, place, and time.   Skin: Skin is warm and dry. No rash noted.   Psychiatric: She has a normal mood and affect. Judgment normal.   Nursing note and vitals reviewed.        Diagnoses and health risks identified today and associated recommendations/orders:    1. Dyslipidemia  Stable and controlled on lipitor  Continue medication as prescribed  Continue current treatment plan as previously prescribed with your PCP      2. Atherosclerosis of native coronary artery with other form of angina pectoris, unspecified whether native or transplanted heart  Stable - routine follow up and labs    3. Gastroesophageal reflux disease, esophagitis " presence not specified  Stable and controlled on nexium  Continue medication as prescribed  Continue current treatment plan as previously prescribed with your PCP      4. Immunization due    - (In Office Administered) Td Vaccine  - (In Office Administered) Pneumococcal Polysaccharide Vaccine (23 Valent) (SQ/IM)      Provided Flores with a 5-10 year written screening schedule and personal prevention plan. Recommendations were developed using the USPSTF age appropriate recommendations. Education, counseling, and referrals were provided as needed. After Visit Summary printed and given to patient which includes a list of additional screenings\tests needed.    Follow up for Routine scheduled appointment.    Meseret Perkins NP

## 2019-08-22 ENCOUNTER — NURSE TRIAGE (OUTPATIENT)
Dept: ADMINISTRATIVE | Facility: CLINIC | Age: 69
End: 2019-08-22

## 2019-08-22 NOTE — TELEPHONE ENCOUNTER
"    Reason for Disposition   Took another person's prescription drug    Additional Information   Negative: [1] DOUBLE DOSE (an extra dose or lesser amount) of prescription drug AND [2] any symptoms (e.g., dizziness, nausea, pain, sleepiness)   Negative: [1] DOUBLE DOSE (an extra dose or lesser amount) of over-the-counter (OTC) drug AND [2] any symptoms (e.g., dizziness, nausea, pain, sleepiness)   Negative: MORE THAN A DOUBLE DOSE of a prescription or over-the-counter (OTC) drug    Protocols used: MEDICATION QUESTION CALL-A-    Pt called stated she accidentally took her  meds this am. Pt stated "I feel fine". Pt instructed per care advice and given the number to poison control.   "

## 2019-09-16 NOTE — TELEPHONE ENCOUNTER
----- Message from Kiara Mercedes sent at 9/16/2019 10:32 AM CDT -----  Contact: Pt  Type:  RX Refill Request    Who Called: Flores Adamson  Refill or New Rx:Refill  RX Name and Strength:cloprimazole betamethasone  How is the patient currently taking it? (ex. 1XDay):  Is this a 30 day or 90 day RX:  Preferred Pharmacy with phone number:  [id number] - [place], [place] - [address] AT [place] [address] & [facility]  [address] [facility] [place] [address]  Phone: [phone] Fax: [phone]  Local or Mail Order:tod  Ordering Provider:Dr. Badillo  Would the patient rather a call back or a response via [facility]? Call Back  Best Call Back Number:[phone] (home)   Additional Information: Pt asked if there was anything similar at a reasonable price it would be great.

## 2019-09-17 RX ORDER — CLOTRIMAZOLE AND BETAMETHASONE DIPROPIONATE 10; .64 MG/G; MG/G
CREAM TOPICAL 2 TIMES DAILY
Qty: 45 G | Refills: 12 | Status: SHIPPED | OUTPATIENT
Start: 2019-09-17 | End: 2021-07-19

## 2020-02-27 ENCOUNTER — TELEPHONE (OUTPATIENT)
Dept: FAMILY MEDICINE | Facility: CLINIC | Age: 70
End: 2020-02-27

## 2020-02-27 NOTE — TELEPHONE ENCOUNTER
----- Message from Sade Woodard sent at 2/27/2020  1:18 PM CST -----  Contact: Patient  Please call patient concerning scheduling an annual wellness visit for her and her . Any day is good except for Fridays. Ph .[phone] (home)

## 2020-02-27 NOTE — TELEPHONE ENCOUNTER
----- Message from Aline King sent at 2/27/2020 11:28 AM CST -----  Contact: [phone]  Would like to consult with the nurse, patient would like to schedule an Nurse  Visit to be seen in the Office, Please call back at [phone], Thank cassie

## 2020-07-16 RX ORDER — ATORVASTATIN CALCIUM 40 MG/1
40 TABLET, FILM COATED ORAL DAILY
Qty: 90 TABLET | Refills: 3 | Status: SHIPPED | OUTPATIENT
Start: 2020-07-16 | End: 2021-01-29 | Stop reason: SDUPTHER

## 2020-09-11 ENCOUNTER — TELEPHONE (OUTPATIENT)
Dept: FAMILY MEDICINE | Facility: CLINIC | Age: 70
End: 2020-09-11

## 2020-09-11 NOTE — TELEPHONE ENCOUNTER
Returned call to patient, patient states that her neck hurts, patient states that she has seen the chiropractor several times but no help, patient states she may need a muscle relaxer.  Patient states that she will go see her chiropractor today for evaluation.

## 2020-09-11 NOTE — TELEPHONE ENCOUNTER
----- Message from Pam García sent at 9/11/2020 10:08 AM CDT -----  Regarding: same day  Contact: pt  Pt requesting an appt for a pain in the back of her neck. [phone]

## 2020-09-30 ENCOUNTER — OFFICE VISIT (OUTPATIENT)
Dept: FAMILY MEDICINE | Facility: CLINIC | Age: 70
End: 2020-09-30
Payer: MEDICARE

## 2020-09-30 DIAGNOSIS — Z13.220 ENCOUNTER FOR LIPID SCREENING FOR CARDIOVASCULAR DISEASE: ICD-10-CM

## 2020-09-30 DIAGNOSIS — Z79.899 ENCOUNTER FOR LONG-TERM (CURRENT) USE OF MEDICATIONS: ICD-10-CM

## 2020-09-30 DIAGNOSIS — I25.119 CORONARY ARTERY DISEASE INVOLVING NATIVE CORONARY ARTERY OF NATIVE HEART WITH ANGINA PECTORIS: ICD-10-CM

## 2020-09-30 DIAGNOSIS — Z13.6 ENCOUNTER FOR LIPID SCREENING FOR CARDIOVASCULAR DISEASE: ICD-10-CM

## 2020-09-30 DIAGNOSIS — Z00.01 ENCOUNTER FOR GENERAL ADULT MEDICAL EXAMINATION WITH ABNORMAL FINDINGS: Primary | ICD-10-CM

## 2020-09-30 DIAGNOSIS — Z12.31 ENCOUNTER FOR SCREENING MAMMOGRAM FOR BREAST CANCER: ICD-10-CM

## 2020-09-30 DIAGNOSIS — M54.2 NECK PAIN ON LEFT SIDE: ICD-10-CM

## 2020-09-30 DIAGNOSIS — Z11.59 NEED FOR HEPATITIS C SCREENING TEST: ICD-10-CM

## 2020-09-30 PROBLEM — S52.532D CLOSED COLLES' FRACTURE OF LEFT RADIUS WITH ROUTINE HEALING: Status: ACTIVE | Noted: 2018-03-13

## 2020-09-30 PROBLEM — S62.102A CLOSED FRACTURE OF LEFT WRIST: Status: ACTIVE | Noted: 2018-03-13

## 2020-09-30 PROCEDURE — 99999 PR PBB SHADOW E&M-EST. PATIENT-LVL V: CPT | Mod: PBBFAC,,, | Performed by: FAMILY MEDICINE

## 2020-09-30 PROCEDURE — 99213 PR OFFICE/OUTPT VISIT, EST, LEVL III, 20-29 MIN: ICD-10-PCS | Mod: S$GLB,,, | Performed by: FAMILY MEDICINE

## 2020-09-30 PROCEDURE — 99213 OFFICE O/P EST LOW 20 MIN: CPT | Mod: S$GLB,,, | Performed by: FAMILY MEDICINE

## 2020-09-30 PROCEDURE — 99999 PR PBB SHADOW E&M-EST. PATIENT-LVL V: ICD-10-PCS | Mod: PBBFAC,,, | Performed by: FAMILY MEDICINE

## 2020-09-30 RX ORDER — FERROUS SULFATE, DRIED 160(50) MG
1 TABLET, EXTENDED RELEASE ORAL 2 TIMES DAILY WITH MEALS
COMMUNITY

## 2020-09-30 RX ORDER — TIZANIDINE 4 MG/1
4 TABLET ORAL NIGHTLY PRN
Qty: 30 TABLET | Refills: 0 | Status: SHIPPED | OUTPATIENT
Start: 2020-09-30 | End: 2020-10-29 | Stop reason: SDUPTHER

## 2020-09-30 NOTE — PATIENT INSTRUCTIONS
Follow up in about 1 year (around 9/30/2021), or if symptoms worsen or fail to improve, for Annual Wellness Exam.     If no improvement in symptoms or symptoms worsen, please be advised to call MD, follow-up at clinic and/or go to ER if becomes severe.    Agustín Carter M.D.        We Offer TELEHEALTH & Same Day Appointments!   Book your Telehealth appointment with me through my nurse or   Clinic appointments on [facility]!    [address] [place] [address]    Office: [phone]   FAX: [phone]    Check out my Facebook Page and Follow Me at: [email or web address]    Check out my website at [facility] by clicking on: [email or web address]    To Schedule appointments online, go to [employer]: [email or web address]

## 2020-09-30 NOTE — PROGRESS NOTES
This note is specifically for wellness visit performed today.     WELLNESS EXAM      Patient ID: Flores Adamson is a 69 y.o. female with  has a past medical history of Angina pectoris, Anticoagulant long-term use, CAD (coronary artery disease), and GERD (gastroesophageal reflux disease).     Chief Complaint:  Encounter for wellness exam    Well Adult Physical: Patient here for a comprehensive physical exam.The patient reports left-sided neck pain due to muscle spasms problems.  Patient reports that she has tried over-the-counter medications without relief.  Patient has been to the chiropractor multiple times and had a massage.  Patient has no relief.  No heavy lifting or trauma.  Do you take any herbs or supplements that were not prescribed by a doctor? no   Are you taking calcium supplements? no   Are you taking aspirin daily?  Yes  LMP: No LMP recorded. Patient is postmenopausal.   MMG:  Patient is due, order placed.  PAP:  Not indicated  Colonoscopy:  Up-to-date    Health Maintenance Topics with due status: Not Due       Topic Last Completion Date    Colorectal Cancer Screening 08/14/2015    DEXA SCAN 08/08/2018    High Dose Statin 09/30/2020    Aspirin/Antiplatelet Therapy 09/30/2020        ==============================================  History reviewed.   Health Maintenance Due   Topic Date Due    Hepatitis C Screening  1950    TETANUS VACCINE  10/07/1966    Shingles Vaccine (1 of 2) 10/07/1998    Lipid Panel  03/28/2018    Influenza Vaccine (1) 08/01/2020    Mammogram  10/14/2020       Past Medical History:  Past Medical History:   Diagnosis Date    Angina pectoris     Anticoagulant long-term use     CAD (coronary artery disease)     with stents x 3    GERD (gastroesophageal reflux disease)      Past Surgical History:   Procedure Laterality Date    2006 2 stents      2009 1 stent      ESOPHAGOGASTRODUODENOSCOPY N/A 11/28/2018    Procedure: EGD (ESOPHAGOGASTRODUODENOSCOPY);  Surgeon:  Marvin Alvarado MD;  [facility];  Service: Endoscopy;  Laterality: N/A;    WRIST FRACTURE SURGERY  03/2018     Review of patient's allergies indicates:   Allergen Reactions    Sulfa (sulfonamide antibiotics) Shortness Of Breath     Current Outpatient Medications on File Prior to Visit   Medication Sig Dispense Refill    aspirin (ECOTRIN) 81 MG EC tablet Take 81 mg by mouth once a week.       atorvastatin (LIPITOR) 40 MG tablet Take 1 tablet (40 mg total) by mouth once daily. 90 tablet 3    calcium-vitamin D3 (OS-SHEILA 500 + D3) 500 mg(1,250mg) -200 unit per tablet Take 1 tablet by mouth 2 (two) times daily with meals.      clotrimazole-betamethasone 1-0.05% (LOTRISONE) cream Apply topically 2 (two) times daily. 45 g 12    NEXIUM 40 mg capsule take 1 capsule by mouth twice a day before MEALS 60 capsule 11    nitroGLYCERIN (NITROSTAT) 0.4 MG SL tablet Place 0.4 mg under the tongue.      nutritional supplements Liqd Take by mouth once daily.        No current facility-administered medications on file prior to visit.      Social History     Socioeconomic History    Marital status:      Spouse name: Not on file    Number of children: Not on file    Years of education: Not on file    Highest education level: Not on file   Occupational History    Not on file   Social Needs    Financial resource strain: Not very hard    Food insecurity     Worry: Never true     Inability: Never true    Transportation needs     Medical: No     Non-medical: No   Tobacco Use    Smoking status: Never Smoker    Smokeless tobacco: Never Used   Substance and Sexual Activity    Alcohol use: No    Drug use: No    Sexual activity: Not on file   Lifestyle    Physical activity     Days per week: Not on file     Minutes per session: Not on file    Stress: Only a little   Relationships    Social connections     Talks on phone: More than three times a week     Gets together: More than three times a week      Attends [employer] service: More than 4 times per year     Active member of club or organization: No     Attends meetings of clubs or organizations: Never     Relationship status:    Other Topics Concern    Not on file   Social History Narrative    Not on file     Family History   Problem Relation Age of Onset    Heart disease Father        Review of Systems   Constitutional: Negative for chills, fatigue, fever and unexpected weight change.   HENT: Negative for ear pain and sore throat.    Eyes: Negative for redness and visual disturbance.   Respiratory: Negative for cough and shortness of breath.    Cardiovascular: Negative for chest pain and palpitations.   Gastrointestinal: Negative for nausea and vomiting.   Genitourinary: Negative for difficulty urinating and hematuria.   Musculoskeletal: Positive for myalgias and neck pain. Negative for arthralgias.   Skin: Negative for rash and wound.   Neurological: Negative for weakness and headaches.   Psychiatric/Behavioral: Negative for sleep disturbance. The patient is not nervous/anxious.         Objective:    Nursing note and vitals reviewed.  Vitals:    09/30/20 1142   BP: 136/86   Pulse:    Temp:      Body mass index is 33.29 kg/m².     Physical Exam  Vitals signs and nursing note reviewed.   Constitutional:       General: She is not in acute distress.     Appearance: She is well-developed.   HENT:      Head: Normocephalic and atraumatic.   Eyes:      Pupils: Pupils are equal, round, and reactive to light.   Neck:      Musculoskeletal: Normal range of motion and neck supple.   Cardiovascular:      Rate and Rhythm: Normal rate and regular rhythm.      Heart sounds: Normal heart sounds. No murmur.   Pulmonary:      Effort: Pulmonary effort is normal. No respiratory distress.      Breath sounds: Normal breath sounds. No wheezing.   Musculoskeletal: Normal range of motion.      Cervical back: She exhibits tenderness, pain and spasm. She exhibits normal range of  motion, no bony tenderness, no swelling, no edema, no deformity and no laceration.        Back:       Comments: There is increased muscle tone and muscle spasm in the paraspinal cervical region worse on the left.   Skin:     General: Skin is warm and dry.      Capillary Refill: Capillary refill takes less than 2 seconds.   Neurological:      Mental Status: She is alert and oriented to person, place, and time.      Cranial Nerves: No cranial nerve deficit.   Psychiatric:         Behavior: Behavior normal.       Lab Results   Component Value Date    WBC 7.10 07/10/2018    HGB 13.2 07/10/2018    HCT 40.9 07/10/2018    MCV 95 07/10/2018     07/10/2018         Chemistry        Component Value Date/Time     08/09/2018 1334    K 4.4 08/09/2018 1334     08/09/2018 1334    CO2 24 08/09/2018 1334    BUN 13 08/09/2018 1334    CREATININE 0.7 08/09/2018 1334     08/09/2018 1334        Component Value Date/Time    CALCIUM 9.7 08/09/2018 1334    ALKPHOS 126 08/09/2018 1334    AST 22 08/09/2018 1334    ALT 22 08/09/2018 1334    BILITOT 0.6 08/09/2018 1334    ESTGFRAFRICA >60.0 08/09/2018 1334    EGFRNONAA >60.0 08/09/2018 1334          Lab Results   Component Value Date    TSH 5.127 (H) 03/28/2017    FREET4 0.78 03/28/2017                 Assessment / Plan:      1.  ANNUAL WELLNESS EXAM -patient here for annual wellness exam.  Labs ordered.  Health maintenance was reviewed and ordered.  Medications were reviewed and reconciled.    Complete history and physical was completed today.  Complete and thorough medication reconciliation was performed.  Discussed risks and benefits of medications.  Advised patient on orders and health maintenance.  We discussed old records and old labs if available.  Will request any records not available through epic.  Continue current medications listed on your summary sheet.    All questions were answered. Patient had no further concerns. Advised of Wellness plan. Follow up in 1  year for ANNUAL WELLNESS EXAM    Orders Placed This Encounter   Procedures    Mammo Digital Screening Bilat w/ Chirag     Standing Status:   Future     Standing Expiration Date:   11/30/2021     Order Specific Question:   May the Radiologist modify the order per protocol to meet the clinical needs of the patient?     Answer:   Yes    CBC auto differential     Standing Status:   Standing     Number of Occurrences:   99     Standing Expiration Date:   11/29/2021    Comprehensive metabolic panel     Standing Status:   Standing     Number of Occurrences:   99     Standing Expiration Date:   11/29/2021    Hemoglobin A1C     Standing Status:   Standing     Number of Occurrences:   99     Standing Expiration Date:   11/30/2021    Hepatitis C Antibody     Standing Status:   Future     Standing Expiration Date:   11/30/2021    Lipid Panel     Standing Status:   Standing     Number of Occurrences:   99     Standing Expiration Date:   11/30/2021    TSH     Standing Status:   Standing     Number of Occurrences:   99     Standing Expiration Date:   11/30/2021    Ambulatory referral/consult to Physical/Occupational Therapy     Standing Status:   Future     Standing Expiration Date:   10/30/2021     Referral Priority:   Routine     Referral Type:   Physical Medicine     Referral Reason:   Specialty Services Required     Referred to Provider:   [facility]     Requested Specialty:   Physical Therapy     Number of Visits Requested:   1       Medications Ordered This Encounter   Medications    tiZANidine (ZANAFLEX) 4 MG tablet     Sig: Take 1 tablet (4 mg total) by mouth nightly as needed.     Dispense:  30 tablet     Refill:  0          Agustín Carter MD

## 2020-10-03 VITALS
SYSTOLIC BLOOD PRESSURE: 136 MMHG | HEIGHT: 62 IN | BODY MASS INDEX: 33.49 KG/M2 | DIASTOLIC BLOOD PRESSURE: 86 MMHG | WEIGHT: 182 LBS | HEART RATE: 73 BPM | TEMPERATURE: 97 F

## 2020-10-05 ENCOUNTER — LAB VISIT (OUTPATIENT)
Dept: LAB | Facility: HOSPITAL | Age: 70
End: 2020-10-05
Attending: FAMILY MEDICINE
Payer: MEDICARE

## 2020-10-05 DIAGNOSIS — Z00.01 ENCOUNTER FOR GENERAL ADULT MEDICAL EXAMINATION WITH ABNORMAL FINDINGS: ICD-10-CM

## 2020-10-05 DIAGNOSIS — Z11.59 NEED FOR HEPATITIS C SCREENING TEST: ICD-10-CM

## 2020-10-05 DIAGNOSIS — Z13.6 ENCOUNTER FOR LIPID SCREENING FOR CARDIOVASCULAR DISEASE: ICD-10-CM

## 2020-10-05 DIAGNOSIS — Z79.899 ENCOUNTER FOR LONG-TERM (CURRENT) USE OF MEDICATIONS: ICD-10-CM

## 2020-10-05 DIAGNOSIS — Z13.220 ENCOUNTER FOR LIPID SCREENING FOR CARDIOVASCULAR DISEASE: ICD-10-CM

## 2020-10-05 LAB
ALBUMIN SERPL BCP-MCNC: 3.9 G/DL (ref 3.5–5.2)
ALP SERPL-CCNC: 112 U/L (ref 55–135)
ALT SERPL W/O P-5'-P-CCNC: 23 U/L (ref 10–44)
ANION GAP SERPL CALC-SCNC: 10 MMOL/L (ref 8–16)
AST SERPL-CCNC: 23 U/L (ref 10–40)
BASOPHILS # BLD AUTO: 0.04 K/UL (ref 0–0.2)
BASOPHILS NFR BLD: 0.6 % (ref 0–1.9)
BILIRUB SERPL-MCNC: 0.4 MG/DL (ref 0.1–1)
BUN SERPL-MCNC: 16 MG/DL (ref 8–23)
CALCIUM SERPL-MCNC: 9.7 MG/DL (ref 8.7–10.5)
CHLORIDE SERPL-SCNC: 104 MMOL/L (ref 95–110)
CHOLEST SERPL-MCNC: 132 MG/DL (ref 120–199)
CHOLEST/HDLC SERPL: 2.5 {RATIO} (ref 2–5)
CO2 SERPL-SCNC: 24 MMOL/L (ref 23–29)
CREAT SERPL-MCNC: 0.8 MG/DL (ref 0.5–1.4)
DIFFERENTIAL METHOD: ABNORMAL
EOSINOPHIL # BLD AUTO: 0.2 K/UL (ref 0–0.5)
EOSINOPHIL NFR BLD: 2.3 % (ref 0–8)
ERYTHROCYTE [DISTWIDTH] IN BLOOD BY AUTOMATED COUNT: 12.5 % (ref 11.5–14.5)
EST. GFR  (AFRICAN AMERICAN): >60 ML/MIN/1.73 M^2
EST. GFR  (NON AFRICAN AMERICAN): >60 ML/MIN/1.73 M^2
GLUCOSE SERPL-MCNC: 99 MG/DL (ref 70–110)
HCT VFR BLD AUTO: 41 % (ref 37–48.5)
HDLC SERPL-MCNC: 52 MG/DL (ref 40–75)
HDLC SERPL: 39.4 % (ref 20–50)
HGB BLD-MCNC: 12.7 G/DL (ref 12–16)
IMM GRANULOCYTES # BLD AUTO: 0.01 K/UL (ref 0–0.04)
IMM GRANULOCYTES NFR BLD AUTO: 0.2 % (ref 0–0.5)
LDLC SERPL CALC-MCNC: 65.8 MG/DL (ref 63–159)
LYMPHOCYTES # BLD AUTO: 1.3 K/UL (ref 1–4.8)
LYMPHOCYTES NFR BLD: 19.5 % (ref 18–48)
MCH RBC QN AUTO: 29.8 PG (ref 27–31)
MCHC RBC AUTO-ENTMCNC: 31 G/DL (ref 32–36)
MCV RBC AUTO: 96 FL (ref 82–98)
MONOCYTES # BLD AUTO: 0.5 K/UL (ref 0.3–1)
MONOCYTES NFR BLD: 6.9 % (ref 4–15)
NEUTROPHILS # BLD AUTO: 4.6 K/UL (ref 1.8–7.7)
NEUTROPHILS NFR BLD: 70.5 % (ref 38–73)
NONHDLC SERPL-MCNC: 80 MG/DL
NRBC BLD-RTO: 0 /100 WBC
PLATELET # BLD AUTO: 172 K/UL (ref 150–350)
PMV BLD AUTO: 12.9 FL (ref 9.2–12.9)
POTASSIUM SERPL-SCNC: 4.2 MMOL/L (ref 3.5–5.1)
PROT SERPL-MCNC: 7.7 G/DL (ref 6–8.4)
RBC # BLD AUTO: 4.26 M/UL (ref 4–5.4)
SODIUM SERPL-SCNC: 138 MMOL/L (ref 136–145)
T4 FREE SERPL-MCNC: 0.87 NG/DL (ref 0.71–1.51)
TRIGL SERPL-MCNC: 71 MG/DL (ref 30–150)
TSH SERPL DL<=0.005 MIU/L-ACNC: 4.47 UIU/ML (ref 0.4–4)
WBC # BLD AUTO: 6.52 K/UL (ref 3.9–12.7)

## 2020-10-05 PROCEDURE — 36415 COLL VENOUS BLD VENIPUNCTURE: CPT | Mod: PO

## 2020-10-05 PROCEDURE — 85025 COMPLETE CBC W/AUTO DIFF WBC: CPT

## 2020-10-05 PROCEDURE — 83036 HEMOGLOBIN GLYCOSYLATED A1C: CPT

## 2020-10-05 PROCEDURE — 84439 ASSAY OF FREE THYROXINE: CPT

## 2020-10-05 PROCEDURE — 84443 ASSAY THYROID STIM HORMONE: CPT

## 2020-10-05 PROCEDURE — 80053 COMPREHEN METABOLIC PANEL: CPT

## 2020-10-05 PROCEDURE — 86803 HEPATITIS C AB TEST: CPT

## 2020-10-05 PROCEDURE — 80061 LIPID PANEL: CPT

## 2020-10-06 DIAGNOSIS — E03.9 ACQUIRED HYPOTHYROIDISM: Primary | ICD-10-CM

## 2020-10-06 LAB
ESTIMATED AVG GLUCOSE: 114 MG/DL (ref 68–131)
HBA1C MFR BLD HPLC: 5.6 % (ref 4–5.6)

## 2020-10-06 RX ORDER — LEVOTHYROXINE SODIUM 25 UG/1
25 TABLET ORAL DAILY
Qty: 30 TABLET | Refills: 1 | Status: SHIPPED | OUTPATIENT
Start: 2020-10-06 | End: 2020-11-02 | Stop reason: SDUPTHER

## 2020-10-06 NOTE — PROGRESS NOTES
#CALL THE PATIENT# to discuss results/see if they have questions and document verification. Make FU appt if needed.    #Pend me the orders in my interpretation below.#    I have sent a message to them with the interpretation (see below).  See if they have any questions and make a follow-up appointment if not already schedule and if needed.    Also please address any outstanding health maintenance that may be due: Hepatitis C Screening due on 1950  TETANUS VACCINE due on 10/07/1966  Shingles Vaccine(1 of 2) due on 10/07/1998  Influenza Vaccine(1) due on 08/01/2020  Mammogram due on 10/14/2020  ====================================    My interpretation that was sent to them through [employer]:    Coretta, I have reviewed your recent blood work.     Your complete blood count is NORMAL.  NO ANEMIA.    Your metabolic panel which shows your glucose, kidney function, electrolytes, and liver function is NORMAL.   Thyroid studies are SLIGHTLY ABNORMAL.  WILL START MEDICATION CALLED LEVOTHYROXINE 25 MCG.  REPEAT LABS IN TWO MONTHS.  Your cholesterol is NORMAL.    Your hemoglobin A1c is NORMAL.  This test is gold standard screening test for diabetes.  It is a measures 3 months of your average blood sugar.

## 2020-10-07 LAB — HCV AB SERPL QL IA: NEGATIVE

## 2020-10-13 ENCOUNTER — TELEPHONE (OUTPATIENT)
Dept: FAMILY MEDICINE | Facility: CLINIC | Age: 70
End: 2020-10-13

## 2020-10-13 NOTE — TELEPHONE ENCOUNTER
----- Message from Selene Enriquez sent at 10/13/2020  2:35 PM CDT -----  Patient was wondering if he was due for an colonoscopy test . Please call back at [phone]. Thanks

## 2020-10-13 NOTE — TELEPHONE ENCOUNTER
Attempted to reach patient, left message asking patient to call the clinic back.  According to Care Gap in chart colorectal screening due 08/14/2025.

## 2020-10-13 NOTE — TELEPHONE ENCOUNTER
Patient returned call, patient notified that colorectal screening not due according to care gap id due 08/14/2025

## 2020-10-29 DIAGNOSIS — M54.2 NECK PAIN ON LEFT SIDE: ICD-10-CM

## 2020-10-29 RX ORDER — TIZANIDINE 4 MG/1
4 TABLET ORAL NIGHTLY PRN
Qty: 30 TABLET | Refills: 0 | Status: SHIPPED | OUTPATIENT
Start: 2020-10-29 | End: 2020-11-28

## 2020-10-29 NOTE — TELEPHONE ENCOUNTER
Received a faxed refill request from [employer] for the pended medication, please advise.

## 2020-11-02 ENCOUNTER — HOSPITAL ENCOUNTER (OUTPATIENT)
Dept: RADIOLOGY | Facility: HOSPITAL | Age: 70
Discharge: HOME | End: 2020-11-02
Attending: NURSE PRACTITIONER
Payer: MEDICARE

## 2020-11-02 ENCOUNTER — OFFICE VISIT (OUTPATIENT)
Dept: FAMILY MEDICINE | Facility: CLINIC | Age: 70
End: 2020-11-02
Payer: MEDICARE

## 2020-11-02 VITALS
TEMPERATURE: 98 F | OXYGEN SATURATION: 98 % | HEART RATE: 68 BPM | DIASTOLIC BLOOD PRESSURE: 74 MMHG | WEIGHT: 188.81 LBS | BODY MASS INDEX: 34.74 KG/M2 | SYSTOLIC BLOOD PRESSURE: 136 MMHG | HEIGHT: 62 IN

## 2020-11-02 DIAGNOSIS — M54.2 CERVICALGIA: Primary | ICD-10-CM

## 2020-11-02 DIAGNOSIS — M25.511 RIGHT SHOULDER PAIN, UNSPECIFIED CHRONICITY: ICD-10-CM

## 2020-11-02 DIAGNOSIS — E03.9 ACQUIRED HYPOTHYROIDISM: ICD-10-CM

## 2020-11-02 DIAGNOSIS — M54.2 CERVICALGIA: ICD-10-CM

## 2020-11-02 PROCEDURE — 73030 XR SHOULDER COMPLETE 2 OR MORE VIEWS RIGHT: ICD-10-PCS | Mod: 26,RT,, | Performed by: RADIOLOGY

## 2020-11-02 PROCEDURE — 99999 PR PBB SHADOW E&M-EST. PATIENT-LVL V: CPT | Mod: PBBFAC,,, | Performed by: NURSE PRACTITIONER

## 2020-11-02 PROCEDURE — 73030 X-RAY EXAM OF SHOULDER: CPT | Mod: 26,RT,, | Performed by: RADIOLOGY

## 2020-11-02 PROCEDURE — 72040 X-RAY EXAM NECK SPINE 2-3 VW: CPT | Mod: 26,,, | Performed by: RADIOLOGY

## 2020-11-02 PROCEDURE — 1101F PT FALLS ASSESS-DOCD LE1/YR: CPT | Mod: CPTII,S$GLB,, | Performed by: NURSE PRACTITIONER

## 2020-11-02 PROCEDURE — 99999 PR PBB SHADOW E&M-EST. PATIENT-LVL V: ICD-10-PCS | Mod: PBBFAC,,, | Performed by: NURSE PRACTITIONER

## 2020-11-02 PROCEDURE — 1159F MED LIST DOCD IN RCRD: CPT | Mod: S$GLB,,, | Performed by: NURSE PRACTITIONER

## 2020-11-02 PROCEDURE — 1125F PR PAIN SEVERITY QUANTIFIED, PAIN PRESENT: ICD-10-PCS | Mod: S$GLB,,, | Performed by: NURSE PRACTITIONER

## 2020-11-02 PROCEDURE — 73030 X-RAY EXAM OF SHOULDER: CPT | Mod: TC,PO,RT

## 2020-11-02 PROCEDURE — 3008F BODY MASS INDEX DOCD: CPT | Mod: CPTII,S$GLB,, | Performed by: NURSE PRACTITIONER

## 2020-11-02 PROCEDURE — 1159F PR MEDICATION LIST DOCUMENTED IN MEDICAL RECORD: ICD-10-PCS | Mod: S$GLB,,, | Performed by: NURSE PRACTITIONER

## 2020-11-02 PROCEDURE — 99213 OFFICE O/P EST LOW 20 MIN: CPT | Mod: S$GLB,,, | Performed by: NURSE PRACTITIONER

## 2020-11-02 PROCEDURE — 3008F PR BODY MASS INDEX (BMI) DOCUMENTED: ICD-10-PCS | Mod: CPTII,S$GLB,, | Performed by: NURSE PRACTITIONER

## 2020-11-02 PROCEDURE — 1101F PR PT FALLS ASSESS DOC 0-1 FALLS W/OUT INJ PAST YR: ICD-10-PCS | Mod: CPTII,S$GLB,, | Performed by: NURSE PRACTITIONER

## 2020-11-02 PROCEDURE — 99213 PR OFFICE/OUTPT VISIT, EST, LEVL III, 20-29 MIN: ICD-10-PCS | Mod: S$GLB,,, | Performed by: NURSE PRACTITIONER

## 2020-11-02 PROCEDURE — 72040 X-RAY EXAM NECK SPINE 2-3 VW: CPT | Mod: TC,PO

## 2020-11-02 PROCEDURE — 1125F AMNT PAIN NOTED PAIN PRSNT: CPT | Mod: S$GLB,,, | Performed by: NURSE PRACTITIONER

## 2020-11-02 PROCEDURE — 72040 XR CERVICAL SPINE AP LATERAL: ICD-10-PCS | Mod: 26,,, | Performed by: RADIOLOGY

## 2020-11-02 RX ORDER — LEVOTHYROXINE SODIUM 25 UG/1
25 TABLET ORAL DAILY
Qty: 90 TABLET | Refills: 4 | Status: SHIPPED | OUTPATIENT
Start: 2020-11-02 | End: 2021-01-28 | Stop reason: SDUPTHER

## 2020-11-02 RX ORDER — METHYLPREDNISOLONE 4 MG/1
TABLET ORAL
Qty: 1 PACKAGE | Refills: 0 | Status: SHIPPED | OUTPATIENT
Start: 2020-11-02 | End: 2020-11-23

## 2020-11-02 NOTE — PATIENT INSTRUCTIONS
Alternate heat/ice to affected area  Avoid strenuous activity, lifting  Report to ER immediately if symptoms worsen

## 2020-11-02 NOTE — PROGRESS NOTES
Subjective:       Patient ID: Flores Adamson is a 70 y.o. female.    Chief Complaint: pain right side neck, radiates to arm    Neck Pain   This is a new problem. The current episode started more than 1 month ago. The problem occurs daily. The problem has been unchanged. The pain is associated with an unknown factor. The pain is present in the midline and right side. The quality of the pain is described as aching. The pain is moderate. The symptoms are aggravated by position and bending. The pain is same all the time. Pertinent negatives include no chest pain, fever, headaches, leg pain, numbness, pain with swallowing, paresis, photophobia, syncope, tingling, trouble swallowing, visual change, weakness or weight loss. Associated symptoms comments: Shoulder pain. She has tried muscle relaxants, NSAIDs and chiropractic manipulation (PT) for the symptoms. The treatment provided mild relief.   Shoulder Pain   The pain is present in the right shoulder. This is a new problem. The current episode started more than 1 month ago. There has been no history of extremity trauma. The problem occurs daily. The problem has been gradually improving. The quality of the pain is described as aching. The pain is moderate. Pertinent negatives include no fever, headaches, inability to bear weight, itching, joint locking, joint swelling, limited range of motion, numbness, stiffness, tingling or visual symptoms. The symptoms are aggravated by activity. She has tried NSAIDS for the symptoms. The treatment provided mild relief. Family history includes arthritis. There is no history of diabetes, Injuries to Extremity or migraines.     Past Medical History:   Diagnosis Date    Angina pectoris     Anticoagulant long-term use     CAD (coronary artery disease)     with stents x 3    GERD (gastroesophageal reflux disease)      Social History     Socioeconomic History    Marital status:      Spouse name: Not on file    Number of  children: Not on file    Years of education: Not on file    Highest education level: Not on file   Occupational History    Not on file   Social Needs    Financial resource strain: Not very hard    Food insecurity     Worry: Never true     Inability: Never true    Transportation needs     Medical: No     Non-medical: No   Tobacco Use    Smoking status: Never Smoker    Smokeless tobacco: Never Used   Substance and Sexual Activity    Alcohol use: No    Drug use: No    Sexual activity: Not on file   Lifestyle    Physical activity     Days per week: Not on file     Minutes per session: Not on file    Stress: Only a little   Relationships    Social connections     Talks on phone: More than three times a week     Gets together: More than three times a week     Attends Christianity service: More than 4 times per year     Active member of club or organization: No     Attends meetings of clubs or organizations: Never     Relationship status:    Other Topics Concern    Not on file   Social History Narrative    Not on file     Past Surgical History:   Procedure Laterality Date    2006 2 stents      2009 1 stent      ESOPHAGOGASTRODUODENOSCOPY N/A 11/28/2018    Procedure: EGD (ESOPHAGOGASTRODUODENOSCOPY);  Surgeon: Marvin Alvarado MD;  Location: [facility];  Service: Endoscopy;  Laterality: N/A;    WRIST FRACTURE SURGERY  03/2018       Review of Systems   Constitutional: Negative.  Negative for fever and weight loss.   HENT: Negative.  Negative for trouble swallowing.    Eyes: Negative.  Negative for photophobia.   Respiratory: Negative.    Cardiovascular: Negative.  Negative for chest pain and syncope.   Gastrointestinal: Negative.    Endocrine: Negative.    Genitourinary: Negative.    Musculoskeletal: Positive for arthralgias (right shoulder) and neck pain. Negative for leg pain and stiffness.   Integumentary:  Negative for itching. Negative.   Allergic/Immunologic: Negative.    Neurological:  Negative.  Negative for tingling, weakness, numbness and headaches.   Psychiatric/Behavioral: Negative.          Objective:      Physical Exam  Vitals signs and nursing note reviewed.   Constitutional:       Appearance: Normal appearance.   HENT:      Head: Normocephalic.      Right Ear: Tympanic membrane, ear canal and external ear normal.      Left Ear: Tympanic membrane, ear canal and external ear normal.      Nose: Nose normal.      Mouth/Throat:      Mouth: Mucous membranes are moist.      Pharynx: Oropharynx is clear.   Eyes:      Conjunctiva/sclera: Conjunctivae normal.   Neck:      Musculoskeletal: Normal range of motion. Normal range of motion. Pain with movement (worse with flexion, right head to shoulder movement) present. No edema, erythema, neck rigidity, crepitus, injury, torticollis, spinous process tenderness or muscular tenderness.   Cardiovascular:      Rate and Rhythm: Normal rate and regular rhythm.      Pulses: Normal pulses.      Heart sounds: Normal heart sounds.   Pulmonary:      Effort: Pulmonary effort is normal.      Breath sounds: Normal breath sounds.   Abdominal:      General: Bowel sounds are normal.      Palpations: Abdomen is soft.   Musculoskeletal:      Right shoulder: She exhibits pain (mild with movement). She exhibits normal range of motion, no tenderness, no bony tenderness, no swelling, no effusion, no crepitus, no deformity, no laceration, no spasm, normal pulse and normal strength.   Skin:     General: Skin is warm and dry.      Capillary Refill: Capillary refill takes 2 to 3 seconds.   Neurological:      Mental Status: She is alert and oriented to person, place, and time.   Psychiatric:         Mood and Affect: Mood normal.         Behavior: Behavior normal.         Thought Content: Thought content normal.         Judgment: Judgment normal.         Assessment:       1. Cervicalgia    2. Right shoulder pain, unspecified chronicity        Plan:           Flores was seen today  for pain right side neck, radiates to arm.    Diagnoses and all orders for this visit:    Cervicalgia  Comments:  Radiates to shoulders  Right shoulder pain, unspecified chronicity  Orders:  -     X-Ray Cervical Spine AP And Lateral; Future  -     X-ray Shoulder 2 or More Views Right; Future  -     methylPREDNISolone (MEDROL DOSEPACK) 4 mg tablet; use as directed  Alternate heat/ice to affected area  Avoid strenuous activity, lifting  Report to ER immediately if symptoms worsen

## 2020-11-11 ENCOUNTER — TELEPHONE (OUTPATIENT)
Dept: FAMILY MEDICINE | Facility: CLINIC | Age: 70
End: 2020-11-11

## 2020-11-11 NOTE — TELEPHONE ENCOUNTER
----- Message from Mindi Medrano sent at 11/11/2020  2:53 PM CST -----  Regarding: med refill  .Type:  RX Refill Request    Who Called: pt  Refill or New Rx: refill   RX Name and Strength: steroid   How is the patient currently taking it? (ex. 1XDay):   Is this a 30 day or 90 day RX:   Preferred Pharmacy with phone number     [id number] - [place] - [address] [facility] [address]  [place] [address]  Phone: [phone] Fax: [phone]    Local or Mail Order: local  Ordering Provider:   Would the patient rather a call back or a response via [facility]? Call back   Best Call Back Number: [phone] (home)     Additional Information: need new Rx // pt request disk with imaging [facility] pt stated she's in [place]

## 2020-11-11 NOTE — TELEPHONE ENCOUNTER
Patient is asking to see if she could have a refill of the steroid pack.  Also asking for xray to make her a disc of her xrays (I will ask Xray dept), please advise on steroids

## 2020-11-11 NOTE — TELEPHONE ENCOUNTER
----- Message from Duke Villasenor sent at 11/11/2020  3:48 PM CST -----  Regarding: Return call  Type:  Patient Returning Call    Who Called:Flores  Who Left Message for Patient:Vidhya  Does the patient know what this is regarding?:MRI question  Would the patient rather a call back or a response via [facility]? Call back  Best Call Back Number:[phone] (home)   Additional Information: na

## 2020-11-12 NOTE — TELEPHONE ENCOUNTER
Spoke with patient let her know xray will call her when they get the disc, Ms Ferris said no to more steroids and recommends neurosurgery if symptoms persist or worsen, patient verbalized understanding.

## 2020-11-17 ENCOUNTER — TELEPHONE (OUTPATIENT)
Dept: FAMILY MEDICINE | Facility: CLINIC | Age: 70
End: 2020-11-17

## 2020-11-17 NOTE — TELEPHONE ENCOUNTER
----- Message from Delia Nguyen sent at 11/17/2020  1:31 PM CST -----  Regarding: pt  Pt would like to know her xray disc came in . Pt would like to know if she can come in to pick them up. Please call back at .[phone] (home)         Thank you ,   Delia Nguyen

## 2020-12-04 ENCOUNTER — LAB VISIT (OUTPATIENT)
Dept: LAB | Facility: HOSPITAL | Age: 70
End: 2020-12-04
Attending: FAMILY MEDICINE
Payer: MEDICARE

## 2020-12-04 DIAGNOSIS — Z79.899 ENCOUNTER FOR LONG-TERM (CURRENT) USE OF MEDICATIONS: ICD-10-CM

## 2020-12-04 DIAGNOSIS — Z00.01 ENCOUNTER FOR GENERAL ADULT MEDICAL EXAMINATION WITH ABNORMAL FINDINGS: ICD-10-CM

## 2020-12-04 PROCEDURE — 84443 ASSAY THYROID STIM HORMONE: CPT

## 2020-12-04 PROCEDURE — 36415 COLL VENOUS BLD VENIPUNCTURE: CPT | Mod: PO

## 2020-12-05 LAB — TSH SERPL DL<=0.005 MIU/L-ACNC: 2.42 UIU/ML (ref 0.4–4)

## 2020-12-05 NOTE — PROGRESS NOTES
Please CALL patient with results and Document verification.   [phone]  TSH IS NOW NORMAL.  CONTINUE CURRENT MEDICATION REGIMEN.

## 2020-12-28 ENCOUNTER — TELEPHONE (OUTPATIENT)
Dept: FAMILY MEDICINE | Facility: CLINIC | Age: 70
End: 2020-12-28

## 2020-12-28 NOTE — TELEPHONE ENCOUNTER
----- Message from Saloni Esteban sent at 12/28/2020  2:41 PM CST -----  Type:  Sooner Apoointment Request    Caller is requesting a sooner appointment.  Caller declined first available appointment listed below.  Caller will not accept being placed on the waitlist and is requesting a message be sent to doctor.  Name of Caller:pt  When is the first available appointment?1/4  Symptoms:neck/shoulder/leg pain  very painful  Would the patient rather a call back or a response via [facility]? Call back  Best Call Back Number:[phone]  Additional Information: .    Thank you

## 2020-12-28 NOTE — TELEPHONE ENCOUNTER
Returned call to patient, patient states that she is having pain in her shoulders bilaterally and bilateral groin area.  Patient states that all of this started in August and has went to a chiropractor, an ortho, has had physical therapy as well. An appointment was set up for the patient to see Dr. Badillo on 12/30/2020 at 10:40 am.  Patient verbalized understanding of this.

## 2020-12-30 ENCOUNTER — OFFICE VISIT (OUTPATIENT)
Dept: FAMILY MEDICINE | Facility: CLINIC | Age: 70
End: 2020-12-30
Payer: MEDICARE

## 2020-12-30 VITALS
DIASTOLIC BLOOD PRESSURE: 84 MMHG | RESPIRATION RATE: 18 BRPM | BODY MASS INDEX: 34.23 KG/M2 | HEIGHT: 62 IN | SYSTOLIC BLOOD PRESSURE: 138 MMHG | HEART RATE: 69 BPM | WEIGHT: 186 LBS

## 2020-12-30 DIAGNOSIS — M19.90 OSTEOARTHRITIS, UNSPECIFIED OSTEOARTHRITIS TYPE, UNSPECIFIED SITE: Primary | ICD-10-CM

## 2020-12-30 PROCEDURE — 99999 PR PBB SHADOW E&M-EST. PATIENT-LVL III: ICD-10-PCS | Mod: PBBFAC,,, | Performed by: FAMILY MEDICINE

## 2020-12-30 PROCEDURE — 1159F PR MEDICATION LIST DOCUMENTED IN MEDICAL RECORD: ICD-10-PCS | Mod: S$GLB,,, | Performed by: FAMILY MEDICINE

## 2020-12-30 PROCEDURE — 99213 OFFICE O/P EST LOW 20 MIN: CPT | Mod: S$GLB,,, | Performed by: FAMILY MEDICINE

## 2020-12-30 PROCEDURE — 3008F PR BODY MASS INDEX (BMI) DOCUMENTED: ICD-10-PCS | Mod: CPTII,S$GLB,, | Performed by: FAMILY MEDICINE

## 2020-12-30 PROCEDURE — 3008F BODY MASS INDEX DOCD: CPT | Mod: CPTII,S$GLB,, | Performed by: FAMILY MEDICINE

## 2020-12-30 PROCEDURE — 99213 PR OFFICE/OUTPT VISIT, EST, LEVL III, 20-29 MIN: ICD-10-PCS | Mod: S$GLB,,, | Performed by: FAMILY MEDICINE

## 2020-12-30 PROCEDURE — 1101F PR PT FALLS ASSESS DOC 0-1 FALLS W/OUT INJ PAST YR: ICD-10-PCS | Mod: CPTII,S$GLB,, | Performed by: FAMILY MEDICINE

## 2020-12-30 PROCEDURE — 1159F MED LIST DOCD IN RCRD: CPT | Mod: S$GLB,,, | Performed by: FAMILY MEDICINE

## 2020-12-30 PROCEDURE — 3288F PR FALLS RISK ASSESSMENT DOCUMENTED: ICD-10-PCS | Mod: CPTII,S$GLB,, | Performed by: FAMILY MEDICINE

## 2020-12-30 PROCEDURE — 1125F AMNT PAIN NOTED PAIN PRSNT: CPT | Mod: S$GLB,,, | Performed by: FAMILY MEDICINE

## 2020-12-30 PROCEDURE — 1125F PR PAIN SEVERITY QUANTIFIED, PAIN PRESENT: ICD-10-PCS | Mod: S$GLB,,, | Performed by: FAMILY MEDICINE

## 2020-12-30 PROCEDURE — 99999 PR PBB SHADOW E&M-EST. PATIENT-LVL III: CPT | Mod: PBBFAC,,, | Performed by: FAMILY MEDICINE

## 2020-12-30 PROCEDURE — 1101F PT FALLS ASSESS-DOCD LE1/YR: CPT | Mod: CPTII,S$GLB,, | Performed by: FAMILY MEDICINE

## 2020-12-30 PROCEDURE — 3288F FALL RISK ASSESSMENT DOCD: CPT | Mod: CPTII,S$GLB,, | Performed by: FAMILY MEDICINE

## 2020-12-30 RX ORDER — MULTIVITAMIN
1 TABLET ORAL DAILY
COMMUNITY

## 2020-12-30 RX ORDER — DICLOFENAC SODIUM 75 MG/1
75 TABLET, DELAYED RELEASE ORAL 2 TIMES DAILY
Qty: 60 TABLET | Refills: 2 | Status: SHIPPED | OUTPATIENT
Start: 2020-12-30 | End: 2021-02-01 | Stop reason: SDUPTHER

## 2020-12-30 RX ORDER — ARGININE HCL 500 MG
1 CAPSULE ORAL DAILY
COMMUNITY
End: 2021-07-19

## 2020-12-30 RX ORDER — AMOXICILLIN 500 MG
1 CAPSULE ORAL DAILY
COMMUNITY
End: 2021-10-06

## 2020-12-30 NOTE — PROGRESS NOTES
The patient presents with tender painful bilateral shoulders and groin for the past 6-8 months worse in p week. No overt trauma, no fever.  ROS:  General: No fever or sig wt change  HEENT:No other PND eye pain or dc  Respiratory: No cough wheezing  PE: vital signs noted     Neck:Supple without adenopathy,neg spurling  Chest:Clear bilateral breath sounds with mild scattered ronchi  Heart:Regular rhthym without murmer  Abdomen:Soft, non tender,no masses, no hepatosplenomegaly  Extremeties Tender bilateral traps but most tender anterior shoulders rom exacerbates, groin pain exacerbated w int/ext hip rotation    Impression: Prob DJD  Plan: There is some historical component cw cervical radiculopathy but I feel the majority of symptoms are arthralgia  Rev risks of nsaids but rec careful trial diclofenac and reduce acetominophen to 3000mg/day and taper diclofenac when feasible  Consider gabapenting if neuralgic component more prominent

## 2021-01-09 ENCOUNTER — IMMUNIZATION (OUTPATIENT)
Dept: FAMILY MEDICINE | Facility: CLINIC | Age: 71
End: 2021-01-09
Payer: MEDICARE

## 2021-01-09 DIAGNOSIS — Z23 NEED FOR VACCINATION: ICD-10-CM

## 2021-01-09 PROCEDURE — 91300 COVID-19, MRNA, LNP-S, PF, 30 MCG/0.3 ML DOSE VACCINE: CPT | Mod: PBBFAC,PO

## 2021-01-13 ENCOUNTER — TELEPHONE (OUTPATIENT)
Dept: FAMILY MEDICINE | Facility: CLINIC | Age: 71
End: 2021-01-13

## 2021-01-14 ENCOUNTER — LAB VISIT (OUTPATIENT)
Dept: LAB | Facility: HOSPITAL | Age: 71
End: 2021-01-14
Attending: FAMILY MEDICINE
Payer: MEDICARE

## 2021-01-14 ENCOUNTER — OFFICE VISIT (OUTPATIENT)
Dept: FAMILY MEDICINE | Facility: CLINIC | Age: 71
End: 2021-01-14
Payer: MEDICARE

## 2021-01-14 VITALS
DIASTOLIC BLOOD PRESSURE: 78 MMHG | TEMPERATURE: 98 F | WEIGHT: 187.19 LBS | SYSTOLIC BLOOD PRESSURE: 139 MMHG | HEIGHT: 62 IN | BODY MASS INDEX: 34.45 KG/M2 | HEART RATE: 71 BPM

## 2021-01-14 DIAGNOSIS — M79.10 MYALGIA: ICD-10-CM

## 2021-01-14 DIAGNOSIS — M19.90 OSTEOARTHRITIS, UNSPECIFIED OSTEOARTHRITIS TYPE, UNSPECIFIED SITE: Primary | ICD-10-CM

## 2021-01-14 DIAGNOSIS — E03.9 ACQUIRED HYPOTHYROIDISM: ICD-10-CM

## 2021-01-14 LAB — ERYTHROCYTE [SEDIMENTATION RATE] IN BLOOD BY WESTERGREN METHOD: 81 MM/HR (ref 0–20)

## 2021-01-14 PROCEDURE — 86038 ANTINUCLEAR ANTIBODIES: CPT

## 2021-01-14 PROCEDURE — 1125F AMNT PAIN NOTED PAIN PRSNT: CPT | Mod: S$GLB,,, | Performed by: FAMILY MEDICINE

## 2021-01-14 PROCEDURE — 99213 PR OFFICE/OUTPT VISIT, EST, LEVL III, 20-29 MIN: ICD-10-PCS | Mod: S$GLB,,, | Performed by: FAMILY MEDICINE

## 2021-01-14 PROCEDURE — 1159F PR MEDICATION LIST DOCUMENTED IN MEDICAL RECORD: ICD-10-PCS | Mod: S$GLB,,, | Performed by: FAMILY MEDICINE

## 2021-01-14 PROCEDURE — 3008F PR BODY MASS INDEX (BMI) DOCUMENTED: ICD-10-PCS | Mod: CPTII,S$GLB,, | Performed by: FAMILY MEDICINE

## 2021-01-14 PROCEDURE — 1101F PT FALLS ASSESS-DOCD LE1/YR: CPT | Mod: CPTII,S$GLB,, | Performed by: FAMILY MEDICINE

## 2021-01-14 PROCEDURE — 1101F PR PT FALLS ASSESS DOC 0-1 FALLS W/OUT INJ PAST YR: ICD-10-PCS | Mod: CPTII,S$GLB,, | Performed by: FAMILY MEDICINE

## 2021-01-14 PROCEDURE — 36415 COLL VENOUS BLD VENIPUNCTURE: CPT | Mod: PO

## 2021-01-14 PROCEDURE — 1159F MED LIST DOCD IN RCRD: CPT | Mod: S$GLB,,, | Performed by: FAMILY MEDICINE

## 2021-01-14 PROCEDURE — 99999 PR PBB SHADOW E&M-EST. PATIENT-LVL III: ICD-10-PCS | Mod: PBBFAC,,, | Performed by: FAMILY MEDICINE

## 2021-01-14 PROCEDURE — 86140 C-REACTIVE PROTEIN: CPT

## 2021-01-14 PROCEDURE — 1125F PR PAIN SEVERITY QUANTIFIED, PAIN PRESENT: ICD-10-PCS | Mod: S$GLB,,, | Performed by: FAMILY MEDICINE

## 2021-01-14 PROCEDURE — 99213 OFFICE O/P EST LOW 20 MIN: CPT | Mod: S$GLB,,, | Performed by: FAMILY MEDICINE

## 2021-01-14 PROCEDURE — 3288F FALL RISK ASSESSMENT DOCD: CPT | Mod: CPTII,S$GLB,, | Performed by: FAMILY MEDICINE

## 2021-01-14 PROCEDURE — 3008F BODY MASS INDEX DOCD: CPT | Mod: CPTII,S$GLB,, | Performed by: FAMILY MEDICINE

## 2021-01-14 PROCEDURE — 85651 RBC SED RATE NONAUTOMATED: CPT | Mod: PO

## 2021-01-14 PROCEDURE — 3288F PR FALLS RISK ASSESSMENT DOCUMENTED: ICD-10-PCS | Mod: CPTII,S$GLB,, | Performed by: FAMILY MEDICINE

## 2021-01-14 PROCEDURE — 99999 PR PBB SHADOW E&M-EST. PATIENT-LVL III: CPT | Mod: PBBFAC,,, | Performed by: FAMILY MEDICINE

## 2021-01-14 PROCEDURE — 86431 RHEUMATOID FACTOR QUANT: CPT

## 2021-01-14 PROCEDURE — 84443 ASSAY THYROID STIM HORMONE: CPT

## 2021-01-14 RX ORDER — GABAPENTIN 300 MG/1
900 CAPSULE ORAL NIGHTLY
Qty: 90 CAPSULE | Refills: 0 | Status: SHIPPED | OUTPATIENT
Start: 2021-01-14 | End: 2021-02-03 | Stop reason: SDUPTHER

## 2021-01-14 RX ORDER — PREDNISONE 20 MG/1
20 TABLET ORAL DAILY
Qty: 26 TABLET | Refills: 0 | Status: SHIPPED | OUTPATIENT
Start: 2021-01-14 | End: 2021-02-22

## 2021-01-15 LAB
ANA SER QL IF: NORMAL
CRP SERPL-MCNC: 33.3 MG/L (ref 0–8.2)
TSH SERPL DL<=0.005 MIU/L-ACNC: 3.15 UIU/ML (ref 0.4–4)

## 2021-01-16 LAB — RHEUMATOID FACT SERPL-ACNC: <10 IU/ML (ref 0–15)

## 2021-01-19 ENCOUNTER — TELEPHONE (OUTPATIENT)
Dept: FAMILY MEDICINE | Facility: CLINIC | Age: 71
End: 2021-01-19

## 2021-01-21 ENCOUNTER — OFFICE VISIT (OUTPATIENT)
Dept: CARDIOLOGY | Facility: CLINIC | Age: 71
End: 2021-01-21
Payer: MEDICARE

## 2021-01-21 VITALS
SYSTOLIC BLOOD PRESSURE: 165 MMHG | WEIGHT: 193.81 LBS | DIASTOLIC BLOOD PRESSURE: 80 MMHG | HEIGHT: 62 IN | BODY MASS INDEX: 35.66 KG/M2 | HEART RATE: 63 BPM

## 2021-01-21 DIAGNOSIS — I25.119 CORONARY ARTERY DISEASE INVOLVING NATIVE CORONARY ARTERY OF NATIVE HEART WITH ANGINA PECTORIS: Primary | ICD-10-CM

## 2021-01-21 DIAGNOSIS — E78.5 DYSLIPIDEMIA: ICD-10-CM

## 2021-01-21 PROCEDURE — 3288F FALL RISK ASSESSMENT DOCD: CPT | Mod: CPTII,S$GLB,, | Performed by: INTERNAL MEDICINE

## 2021-01-21 PROCEDURE — 3008F BODY MASS INDEX DOCD: CPT | Mod: CPTII,S$GLB,, | Performed by: INTERNAL MEDICINE

## 2021-01-21 PROCEDURE — 1126F AMNT PAIN NOTED NONE PRSNT: CPT | Mod: S$GLB,,, | Performed by: INTERNAL MEDICINE

## 2021-01-21 PROCEDURE — 99214 PR OFFICE/OUTPT VISIT, EST, LEVL IV, 30-39 MIN: ICD-10-PCS | Mod: S$GLB,,, | Performed by: INTERNAL MEDICINE

## 2021-01-21 PROCEDURE — 99499 RISK ADDL DX/OHS AUDIT: ICD-10-PCS | Mod: S$GLB,,, | Performed by: INTERNAL MEDICINE

## 2021-01-21 PROCEDURE — 1101F PR PT FALLS ASSESS DOC 0-1 FALLS W/OUT INJ PAST YR: ICD-10-PCS | Mod: CPTII,S$GLB,, | Performed by: INTERNAL MEDICINE

## 2021-01-21 PROCEDURE — 99999 PR PBB SHADOW E&M-EST. PATIENT-LVL IV: CPT | Mod: PBBFAC,,, | Performed by: INTERNAL MEDICINE

## 2021-01-21 PROCEDURE — 3288F PR FALLS RISK ASSESSMENT DOCUMENTED: ICD-10-PCS | Mod: CPTII,S$GLB,, | Performed by: INTERNAL MEDICINE

## 2021-01-21 PROCEDURE — 1159F MED LIST DOCD IN RCRD: CPT | Mod: S$GLB,,, | Performed by: INTERNAL MEDICINE

## 2021-01-21 PROCEDURE — 99499 UNLISTED E&M SERVICE: CPT | Mod: S$GLB,,, | Performed by: INTERNAL MEDICINE

## 2021-01-21 PROCEDURE — 1126F PR PAIN SEVERITY QUANTIFIED, NO PAIN PRESENT: ICD-10-PCS | Mod: S$GLB,,, | Performed by: INTERNAL MEDICINE

## 2021-01-21 PROCEDURE — 99999 PR PBB SHADOW E&M-EST. PATIENT-LVL IV: ICD-10-PCS | Mod: PBBFAC,,, | Performed by: INTERNAL MEDICINE

## 2021-01-21 PROCEDURE — 1159F PR MEDICATION LIST DOCUMENTED IN MEDICAL RECORD: ICD-10-PCS | Mod: S$GLB,,, | Performed by: INTERNAL MEDICINE

## 2021-01-21 PROCEDURE — 3008F PR BODY MASS INDEX (BMI) DOCUMENTED: ICD-10-PCS | Mod: CPTII,S$GLB,, | Performed by: INTERNAL MEDICINE

## 2021-01-21 PROCEDURE — 1101F PT FALLS ASSESS-DOCD LE1/YR: CPT | Mod: CPTII,S$GLB,, | Performed by: INTERNAL MEDICINE

## 2021-01-21 PROCEDURE — 99214 OFFICE O/P EST MOD 30 MIN: CPT | Mod: S$GLB,,, | Performed by: INTERNAL MEDICINE

## 2021-01-28 DIAGNOSIS — E03.9 ACQUIRED HYPOTHYROIDISM: ICD-10-CM

## 2021-01-28 RX ORDER — LEVOTHYROXINE SODIUM 25 UG/1
25 TABLET ORAL DAILY
Qty: 90 TABLET | Refills: 4 | Status: SHIPPED | OUTPATIENT
Start: 2021-01-28 | End: 2022-02-10

## 2021-01-29 DIAGNOSIS — E78.5 DYSLIPIDEMIA: Primary | ICD-10-CM

## 2021-01-29 RX ORDER — ATORVASTATIN CALCIUM 40 MG/1
40 TABLET, FILM COATED ORAL DAILY
Qty: 90 TABLET | Refills: 2 | Status: SHIPPED | OUTPATIENT
Start: 2021-01-29 | End: 2021-02-02 | Stop reason: SDUPTHER

## 2021-01-30 ENCOUNTER — IMMUNIZATION (OUTPATIENT)
Dept: FAMILY MEDICINE | Facility: CLINIC | Age: 71
End: 2021-01-30
Payer: MEDICARE

## 2021-01-30 DIAGNOSIS — Z23 NEED FOR VACCINATION: Primary | ICD-10-CM

## 2021-01-30 PROCEDURE — 0002A COVID-19, MRNA, LNP-S, PF, 30 MCG/0.3 ML DOSE VACCINE: CPT | Mod: PBBFAC | Performed by: INTERNAL MEDICINE

## 2021-01-30 PROCEDURE — 91300 COVID-19, MRNA, LNP-S, PF, 30 MCG/0.3 ML DOSE VACCINE: CPT | Mod: PBBFAC | Performed by: INTERNAL MEDICINE

## 2021-02-01 DIAGNOSIS — M25.511 RIGHT SHOULDER PAIN, UNSPECIFIED CHRONICITY: Primary | ICD-10-CM

## 2021-02-02 DIAGNOSIS — E78.5 DYSLIPIDEMIA: Primary | ICD-10-CM

## 2021-02-02 RX ORDER — DICLOFENAC SODIUM 75 MG/1
75 TABLET, DELAYED RELEASE ORAL 2 TIMES DAILY
Qty: 180 TABLET | Refills: 1 | Status: SHIPPED | OUTPATIENT
Start: 2021-02-02 | End: 2021-07-02

## 2021-02-03 ENCOUNTER — TELEPHONE (OUTPATIENT)
Dept: FAMILY MEDICINE | Facility: CLINIC | Age: 71
End: 2021-02-03

## 2021-02-03 RX ORDER — ATORVASTATIN CALCIUM 40 MG/1
40 TABLET, FILM COATED ORAL DAILY
Qty: 90 TABLET | Refills: 2 | Status: SHIPPED | OUTPATIENT
Start: 2021-02-03 | End: 2021-09-16

## 2021-02-03 RX ORDER — GABAPENTIN 300 MG/1
900 CAPSULE ORAL NIGHTLY
Qty: 90 CAPSULE | Refills: 0 | Status: SHIPPED | OUTPATIENT
Start: 2021-02-03 | End: 2021-04-25

## 2021-02-03 RX ORDER — PREDNISONE 20 MG/1
20 TABLET ORAL DAILY
Qty: 26 TABLET | Refills: 0 | OUTPATIENT
Start: 2021-02-03

## 2021-02-12 ENCOUNTER — CLINICAL SUPPORT (OUTPATIENT)
Dept: CARDIOLOGY | Facility: CLINIC | Age: 71
End: 2021-02-12
Attending: INTERNAL MEDICINE
Payer: MEDICARE

## 2021-02-12 VITALS — BODY MASS INDEX: 35.51 KG/M2 | WEIGHT: 193 LBS | HEIGHT: 62 IN

## 2021-02-12 DIAGNOSIS — I25.119 CORONARY ARTERY DISEASE INVOLVING NATIVE CORONARY ARTERY OF NATIVE HEART WITH ANGINA PECTORIS: ICD-10-CM

## 2021-02-12 PROCEDURE — 93306 ECHO (CUPID ONLY): ICD-10-PCS | Mod: S$GLB,,, | Performed by: INTERNAL MEDICINE

## 2021-02-12 PROCEDURE — 93306 TTE W/DOPPLER COMPLETE: CPT | Mod: S$GLB,,, | Performed by: INTERNAL MEDICINE

## 2021-02-12 PROCEDURE — 99999 PR PBB SHADOW E&M-EST. PATIENT-LVL I: ICD-10-PCS | Mod: PBBFAC,,,

## 2021-02-12 PROCEDURE — 99999 PR PBB SHADOW E&M-EST. PATIENT-LVL I: CPT | Mod: PBBFAC,,,

## 2021-02-13 LAB
ASCENDING AORTA: 2.79 CM
AV INDEX (PROSTH): 0.83
AV MEAN GRADIENT: 3 MMHG
AV PEAK GRADIENT: 5 MMHG
AV VALVE AREA: 2.5 CM2
AV VELOCITY RATIO: 0.76
BSA FOR ECHO PROCEDURE: 1.96 M2
CV ECHO LV RWT: 0.41 CM
DOP CALC AO PEAK VEL: 1.09 M/S
DOP CALC AO VTI: 25.16 CM
DOP CALC LVOT AREA: 3 CM2
DOP CALC LVOT DIAMETER: 1.96 CM
DOP CALC LVOT PEAK VEL: 0.83 M/S
DOP CALC LVOT STROKE VOLUME: 62.82 CM3
DOP CALCLVOT PEAK VEL VTI: 20.83 CM
E WAVE DECELERATION TIME: 163.04 MSEC
E/A RATIO: 0.98
E/E' RATIO: 15.43 M/S
ECHO LV POSTERIOR WALL: 0.85 CM (ref 0.6–1.1)
FRACTIONAL SHORTENING: 29 % (ref 28–44)
INTERVENTRICULAR SEPTUM: 0.88 CM (ref 0.6–1.1)
LA MAJOR: 4.45 CM
LA MINOR: 4.99 CM
LA WIDTH: 3.87 CM
LEFT ATRIUM SIZE: 4.42 CM
LEFT ATRIUM VOLUME INDEX: 36.4 ML/M2
LEFT ATRIUM VOLUME: 68.4 CM3
LEFT INTERNAL DIMENSION IN SYSTOLE: 2.93 CM (ref 2.1–4)
LEFT VENTRICLE DIASTOLIC VOLUME INDEX: 39.88 ML/M2
LEFT VENTRICLE DIASTOLIC VOLUME: 74.98 ML
LEFT VENTRICLE MASS INDEX: 58 G/M2
LEFT VENTRICLE SYSTOLIC VOLUME INDEX: 17.6 ML/M2
LEFT VENTRICLE SYSTOLIC VOLUME: 33.16 ML
LEFT VENTRICULAR INTERNAL DIMENSION IN DIASTOLE: 4.12 CM (ref 3.5–6)
LEFT VENTRICULAR MASS: 108.98 G
LV LATERAL E/E' RATIO: 12 M/S
LV SEPTAL E/E' RATIO: 21.6 M/S
MV A" WAVE DURATION": 13.51 MSEC
MV PEAK A VEL: 1.1 M/S
MV PEAK E VEL: 1.08 M/S
PISA MRMAX VEL: 0.05 M/S
PISA TR MAX VEL: 2.61 M/S
PULM VEIN S/D RATIO: 1.19
PV PEAK D VEL: 0.47 M/S
PV PEAK S VEL: 0.56 M/S
RA MAJOR: 4.24 CM
RA PRESSURE: 3 MMHG
RA WIDTH: 2.63 CM
RIGHT VENTRICULAR END-DIASTOLIC DIMENSION: 2.99 CM
RV TISSUE DOPPLER FREE WALL SYSTOLIC VELOCITY 1 (APICAL 4 CHAMBER VIEW): 9.7 CM/S
SINUS: 2.49 CM
STJ: 2.18 CM
TDI LATERAL: 0.09 M/S
TDI SEPTAL: 0.05 M/S
TDI: 0.07 M/S
TR MAX PG: 27 MMHG
TRICUSPID ANNULAR PLANE SYSTOLIC EXCURSION: 1.96 CM
TV REST PULMONARY ARTERY PRESSURE: 30 MMHG

## 2021-02-17 ENCOUNTER — TELEPHONE (OUTPATIENT)
Dept: RHEUMATOLOGY | Facility: CLINIC | Age: 71
End: 2021-02-17

## 2021-02-19 ENCOUNTER — TELEPHONE (OUTPATIENT)
Dept: FAMILY MEDICINE | Facility: CLINIC | Age: 71
End: 2021-02-19

## 2021-02-19 DIAGNOSIS — Z76.89 REFERRAL OF PATIENT: Primary | ICD-10-CM

## 2021-02-22 ENCOUNTER — TELEPHONE (OUTPATIENT)
Dept: RHEUMATOLOGY | Facility: CLINIC | Age: 71
End: 2021-02-22

## 2021-02-22 ENCOUNTER — OFFICE VISIT (OUTPATIENT)
Dept: FAMILY MEDICINE | Facility: CLINIC | Age: 71
End: 2021-02-22
Payer: MEDICARE

## 2021-02-22 ENCOUNTER — TELEPHONE (OUTPATIENT)
Dept: FAMILY MEDICINE | Facility: CLINIC | Age: 71
End: 2021-02-22

## 2021-02-22 VITALS
TEMPERATURE: 98 F | SYSTOLIC BLOOD PRESSURE: 137 MMHG | BODY MASS INDEX: 34.67 KG/M2 | DIASTOLIC BLOOD PRESSURE: 84 MMHG | HEART RATE: 79 BPM | HEIGHT: 62 IN | WEIGHT: 188.38 LBS

## 2021-02-22 DIAGNOSIS — M35.3 PMR (POLYMYALGIA RHEUMATICA): Primary | ICD-10-CM

## 2021-02-22 PROCEDURE — 1125F PR PAIN SEVERITY QUANTIFIED, PAIN PRESENT: ICD-10-PCS | Mod: S$GLB,,, | Performed by: FAMILY MEDICINE

## 2021-02-22 PROCEDURE — 3288F PR FALLS RISK ASSESSMENT DOCUMENTED: ICD-10-PCS | Mod: CPTII,S$GLB,, | Performed by: FAMILY MEDICINE

## 2021-02-22 PROCEDURE — 1101F PT FALLS ASSESS-DOCD LE1/YR: CPT | Mod: CPTII,S$GLB,, | Performed by: FAMILY MEDICINE

## 2021-02-22 PROCEDURE — 1159F PR MEDICATION LIST DOCUMENTED IN MEDICAL RECORD: ICD-10-PCS | Mod: S$GLB,,, | Performed by: FAMILY MEDICINE

## 2021-02-22 PROCEDURE — 3288F FALL RISK ASSESSMENT DOCD: CPT | Mod: CPTII,S$GLB,, | Performed by: FAMILY MEDICINE

## 2021-02-22 PROCEDURE — 99213 OFFICE O/P EST LOW 20 MIN: CPT | Mod: S$GLB,,, | Performed by: FAMILY MEDICINE

## 2021-02-22 PROCEDURE — 1125F AMNT PAIN NOTED PAIN PRSNT: CPT | Mod: S$GLB,,, | Performed by: FAMILY MEDICINE

## 2021-02-22 PROCEDURE — 1101F PR PT FALLS ASSESS DOC 0-1 FALLS W/OUT INJ PAST YR: ICD-10-PCS | Mod: CPTII,S$GLB,, | Performed by: FAMILY MEDICINE

## 2021-02-22 PROCEDURE — 1159F MED LIST DOCD IN RCRD: CPT | Mod: S$GLB,,, | Performed by: FAMILY MEDICINE

## 2021-02-22 PROCEDURE — 3008F BODY MASS INDEX DOCD: CPT | Mod: CPTII,S$GLB,, | Performed by: FAMILY MEDICINE

## 2021-02-22 PROCEDURE — 99999 PR PBB SHADOW E&M-EST. PATIENT-LVL III: CPT | Mod: PBBFAC,,, | Performed by: FAMILY MEDICINE

## 2021-02-22 PROCEDURE — 3008F PR BODY MASS INDEX (BMI) DOCUMENTED: ICD-10-PCS | Mod: CPTII,S$GLB,, | Performed by: FAMILY MEDICINE

## 2021-02-22 PROCEDURE — 99213 PR OFFICE/OUTPT VISIT, EST, LEVL III, 20-29 MIN: ICD-10-PCS | Mod: S$GLB,,, | Performed by: FAMILY MEDICINE

## 2021-02-22 PROCEDURE — 99999 PR PBB SHADOW E&M-EST. PATIENT-LVL III: ICD-10-PCS | Mod: PBBFAC,,, | Performed by: FAMILY MEDICINE

## 2021-02-22 RX ORDER — PREDNISONE 20 MG/1
20 TABLET ORAL DAILY
Qty: 30 TABLET | Refills: 1 | Status: SHIPPED | OUTPATIENT
Start: 2021-02-22 | End: 2022-09-07

## 2021-02-25 ENCOUNTER — TELEPHONE (OUTPATIENT)
Dept: RHEUMATOLOGY | Facility: CLINIC | Age: 71
End: 2021-02-25

## 2021-03-04 ENCOUNTER — PES CALL (OUTPATIENT)
Dept: ADMINISTRATIVE | Facility: CLINIC | Age: 71
End: 2021-03-04

## 2021-03-23 ENCOUNTER — TELEPHONE (OUTPATIENT)
Dept: FAMILY MEDICINE | Facility: CLINIC | Age: 71
End: 2021-03-23

## 2021-03-23 ENCOUNTER — OFFICE VISIT (OUTPATIENT)
Dept: FAMILY MEDICINE | Facility: CLINIC | Age: 71
End: 2021-03-23
Payer: MEDICARE

## 2021-03-23 VITALS
HEIGHT: 62 IN | BODY MASS INDEX: 34.6 KG/M2 | DIASTOLIC BLOOD PRESSURE: 70 MMHG | HEART RATE: 68 BPM | TEMPERATURE: 98 F | WEIGHT: 188 LBS | SYSTOLIC BLOOD PRESSURE: 151 MMHG

## 2021-03-23 DIAGNOSIS — I25.118 ATHEROSCLEROSIS OF NATIVE CORONARY ARTERY WITH OTHER FORM OF ANGINA PECTORIS, UNSPECIFIED WHETHER NATIVE OR TRANSPLANTED HEART: ICD-10-CM

## 2021-03-23 DIAGNOSIS — M35.3 POLYMYALGIA RHEUMATICA: ICD-10-CM

## 2021-03-23 DIAGNOSIS — I25.119 CORONARY ARTERY DISEASE INVOLVING NATIVE CORONARY ARTERY OF NATIVE HEART WITH ANGINA PECTORIS: Primary | ICD-10-CM

## 2021-03-23 DIAGNOSIS — K21.9 GASTROESOPHAGEAL REFLUX DISEASE, UNSPECIFIED WHETHER ESOPHAGITIS PRESENT: ICD-10-CM

## 2021-03-23 DIAGNOSIS — E78.5 DYSLIPIDEMIA: ICD-10-CM

## 2021-03-23 PROCEDURE — G0439 PPPS, SUBSEQ VISIT: HCPCS | Mod: S$GLB,,, | Performed by: NURSE PRACTITIONER

## 2021-03-23 PROCEDURE — 99499 RISK ADDL DX/OHS AUDIT: ICD-10-PCS | Mod: S$GLB,,, | Performed by: NURSE PRACTITIONER

## 2021-03-23 PROCEDURE — 3288F PR FALLS RISK ASSESSMENT DOCUMENTED: ICD-10-PCS | Mod: CPTII,S$GLB,, | Performed by: NURSE PRACTITIONER

## 2021-03-23 PROCEDURE — 1126F PR PAIN SEVERITY QUANTIFIED, NO PAIN PRESENT: ICD-10-PCS | Mod: S$GLB,,, | Performed by: NURSE PRACTITIONER

## 2021-03-23 PROCEDURE — 3008F BODY MASS INDEX DOCD: CPT | Mod: CPTII,S$GLB,, | Performed by: NURSE PRACTITIONER

## 2021-03-23 PROCEDURE — 99999 PR PBB SHADOW E&M-EST. PATIENT-LVL IV: ICD-10-PCS | Mod: PBBFAC,,, | Performed by: NURSE PRACTITIONER

## 2021-03-23 PROCEDURE — 1101F PR PT FALLS ASSESS DOC 0-1 FALLS W/OUT INJ PAST YR: ICD-10-PCS | Mod: CPTII,S$GLB,, | Performed by: NURSE PRACTITIONER

## 2021-03-23 PROCEDURE — G0439 PR MEDICARE ANNUAL WELLNESS SUBSEQUENT VISIT: ICD-10-PCS | Mod: S$GLB,,, | Performed by: NURSE PRACTITIONER

## 2021-03-23 PROCEDURE — 3288F FALL RISK ASSESSMENT DOCD: CPT | Mod: CPTII,S$GLB,, | Performed by: NURSE PRACTITIONER

## 2021-03-23 PROCEDURE — 1126F AMNT PAIN NOTED NONE PRSNT: CPT | Mod: S$GLB,,, | Performed by: NURSE PRACTITIONER

## 2021-03-23 PROCEDURE — 99499 UNLISTED E&M SERVICE: CPT | Mod: S$GLB,,, | Performed by: NURSE PRACTITIONER

## 2021-03-23 PROCEDURE — 99999 PR PBB SHADOW E&M-EST. PATIENT-LVL IV: CPT | Mod: PBBFAC,,, | Performed by: NURSE PRACTITIONER

## 2021-03-23 PROCEDURE — 3008F PR BODY MASS INDEX (BMI) DOCUMENTED: ICD-10-PCS | Mod: CPTII,S$GLB,, | Performed by: NURSE PRACTITIONER

## 2021-03-23 PROCEDURE — 1101F PT FALLS ASSESS-DOCD LE1/YR: CPT | Mod: CPTII,S$GLB,, | Performed by: NURSE PRACTITIONER

## 2021-04-16 DIAGNOSIS — Z79.899 ENCOUNTER FOR LONG-TERM (CURRENT) USE OF MEDICATIONS: Primary | ICD-10-CM

## 2021-04-17 RX ORDER — PREDNISONE 20 MG/1
20 TABLET ORAL DAILY
Qty: 30 TABLET | Refills: 1 | OUTPATIENT
Start: 2021-04-17

## 2021-04-20 ENCOUNTER — LAB VISIT (OUTPATIENT)
Dept: LAB | Facility: HOSPITAL | Age: 71
End: 2021-04-20
Attending: INTERNAL MEDICINE
Payer: MEDICARE

## 2021-04-20 DIAGNOSIS — M35.3 POLYMYALGIA RHEUMATICA: ICD-10-CM

## 2021-04-20 DIAGNOSIS — Z79.52 LONG TERM CURRENT USE OF SYSTEMIC STEROIDS: Primary | ICD-10-CM

## 2021-04-20 LAB
ALBUMIN SERPL BCP-MCNC: 3.3 G/DL (ref 3.5–5.2)
ALP SERPL-CCNC: 84 U/L (ref 55–135)
ALT SERPL W/O P-5'-P-CCNC: 30 U/L (ref 10–44)
ANION GAP SERPL CALC-SCNC: 6 MMOL/L (ref 8–16)
AST SERPL-CCNC: 19 U/L (ref 10–40)
BILIRUB SERPL-MCNC: 0.4 MG/DL (ref 0.1–1)
BUN SERPL-MCNC: 22 MG/DL (ref 8–23)
CALCIUM SERPL-MCNC: 9 MG/DL (ref 8.7–10.5)
CHLORIDE SERPL-SCNC: 106 MMOL/L (ref 95–110)
CO2 SERPL-SCNC: 28 MMOL/L (ref 23–29)
CREAT SERPL-MCNC: 0.9 MG/DL (ref 0.5–1.4)
CRP SERPL-MCNC: 0.1 MG/L (ref 0–8.2)
EST. GFR  (AFRICAN AMERICAN): >60 ML/MIN/1.73 M^2
EST. GFR  (NON AFRICAN AMERICAN): >60 ML/MIN/1.73 M^2
GLUCOSE SERPL-MCNC: 94 MG/DL (ref 70–110)
POTASSIUM SERPL-SCNC: 4.1 MMOL/L (ref 3.5–5.1)
PROT SERPL-MCNC: 6.8 G/DL (ref 6–8.4)
SODIUM SERPL-SCNC: 140 MMOL/L (ref 136–145)

## 2021-04-20 PROCEDURE — 36415 COLL VENOUS BLD VENIPUNCTURE: CPT | Mod: PO | Performed by: INTERNAL MEDICINE

## 2021-04-20 PROCEDURE — 80053 COMPREHEN METABOLIC PANEL: CPT | Performed by: INTERNAL MEDICINE

## 2021-04-20 PROCEDURE — 86140 C-REACTIVE PROTEIN: CPT | Performed by: INTERNAL MEDICINE

## 2021-04-27 ENCOUNTER — PATIENT MESSAGE (OUTPATIENT)
Dept: FAMILY MEDICINE | Facility: CLINIC | Age: 71
End: 2021-04-27

## 2021-04-27 DIAGNOSIS — Z79.899 ENCOUNTER FOR LONG-TERM (CURRENT) USE OF MEDICATIONS: Primary | ICD-10-CM

## 2021-04-27 RX ORDER — PREDNISONE 20 MG/1
20 TABLET ORAL DAILY
Qty: 30 TABLET | Refills: 1 | OUTPATIENT
Start: 2021-04-27

## 2021-05-10 ENCOUNTER — TELEPHONE (OUTPATIENT)
Dept: FAMILY MEDICINE | Facility: CLINIC | Age: 71
End: 2021-05-10

## 2021-05-20 ENCOUNTER — LAB VISIT (OUTPATIENT)
Dept: LAB | Facility: HOSPITAL | Age: 71
End: 2021-05-20
Attending: INTERNAL MEDICINE
Payer: MEDICARE

## 2021-05-20 DIAGNOSIS — Z79.52 LONG TERM CURRENT USE OF SYSTEMIC STEROIDS: ICD-10-CM

## 2021-05-20 DIAGNOSIS — M35.3 POLYMYALGIA RHEUMATICA: Primary | ICD-10-CM

## 2021-05-20 LAB
ALBUMIN SERPL BCP-MCNC: 3.4 G/DL (ref 3.5–5.2)
ALP SERPL-CCNC: 89 U/L (ref 55–135)
ALT SERPL W/O P-5'-P-CCNC: 30 U/L (ref 10–44)
ANION GAP SERPL CALC-SCNC: 9 MMOL/L (ref 8–16)
AST SERPL-CCNC: 18 U/L (ref 10–40)
BILIRUB SERPL-MCNC: 0.4 MG/DL (ref 0.1–1)
BUN SERPL-MCNC: 18 MG/DL (ref 8–23)
CALCIUM SERPL-MCNC: 9.5 MG/DL (ref 8.7–10.5)
CHLORIDE SERPL-SCNC: 104 MMOL/L (ref 95–110)
CO2 SERPL-SCNC: 28 MMOL/L (ref 23–29)
CREAT SERPL-MCNC: 0.9 MG/DL (ref 0.5–1.4)
CRP SERPL-MCNC: 0.8 MG/L (ref 0–8.2)
EST. GFR  (AFRICAN AMERICAN): >60 ML/MIN/1.73 M^2
EST. GFR  (NON AFRICAN AMERICAN): >60 ML/MIN/1.73 M^2
GLUCOSE SERPL-MCNC: 92 MG/DL (ref 70–110)
POTASSIUM SERPL-SCNC: 4.2 MMOL/L (ref 3.5–5.1)
PROT SERPL-MCNC: 7.1 G/DL (ref 6–8.4)
SODIUM SERPL-SCNC: 141 MMOL/L (ref 136–145)

## 2021-05-20 PROCEDURE — 80053 COMPREHEN METABOLIC PANEL: CPT | Performed by: INTERNAL MEDICINE

## 2021-05-20 PROCEDURE — 36415 COLL VENOUS BLD VENIPUNCTURE: CPT | Mod: PO | Performed by: INTERNAL MEDICINE

## 2021-05-20 PROCEDURE — 86140 C-REACTIVE PROTEIN: CPT | Performed by: INTERNAL MEDICINE

## 2021-06-28 ENCOUNTER — PATIENT OUTREACH (OUTPATIENT)
Dept: ADMINISTRATIVE | Facility: HOSPITAL | Age: 71
End: 2021-06-28

## 2021-06-29 ENCOUNTER — TELEPHONE (OUTPATIENT)
Dept: FAMILY MEDICINE | Facility: CLINIC | Age: 71
End: 2021-06-29

## 2021-07-02 DIAGNOSIS — M25.511 RIGHT SHOULDER PAIN, UNSPECIFIED CHRONICITY: ICD-10-CM

## 2021-07-02 RX ORDER — DICLOFENAC SODIUM 75 MG/1
TABLET, DELAYED RELEASE ORAL
Qty: 180 TABLET | Refills: 4 | Status: SHIPPED | OUTPATIENT
Start: 2021-07-02 | End: 2021-07-19

## 2021-07-19 ENCOUNTER — OFFICE VISIT (OUTPATIENT)
Dept: FAMILY MEDICINE | Facility: CLINIC | Age: 71
End: 2021-07-19
Payer: MEDICARE

## 2021-07-19 ENCOUNTER — TELEPHONE (OUTPATIENT)
Dept: CARDIOLOGY | Facility: CLINIC | Age: 71
End: 2021-07-19

## 2021-07-19 ENCOUNTER — LAB VISIT (OUTPATIENT)
Dept: LAB | Facility: HOSPITAL | Age: 71
End: 2021-07-19
Payer: MEDICARE

## 2021-07-19 VITALS
HEIGHT: 62 IN | WEIGHT: 187.31 LBS | OXYGEN SATURATION: 99 % | SYSTOLIC BLOOD PRESSURE: 117 MMHG | HEART RATE: 88 BPM | DIASTOLIC BLOOD PRESSURE: 72 MMHG | TEMPERATURE: 97 F | BODY MASS INDEX: 34.47 KG/M2

## 2021-07-19 DIAGNOSIS — R79.89 LOW TSH LEVEL: ICD-10-CM

## 2021-07-19 DIAGNOSIS — E07.89 OTHER SPECIFIED DISORDERS OF THYROID: ICD-10-CM

## 2021-07-19 DIAGNOSIS — R73.9 BLOOD GLUCOSE ELEVATED: ICD-10-CM

## 2021-07-19 DIAGNOSIS — R93.89 ABNORMAL CXR: ICD-10-CM

## 2021-07-19 DIAGNOSIS — Z09 HOSPITAL DISCHARGE FOLLOW-UP: ICD-10-CM

## 2021-07-19 DIAGNOSIS — R93.89 ABNORMAL CT OF THE CHEST: ICD-10-CM

## 2021-07-19 DIAGNOSIS — Z09 HOSPITAL DISCHARGE FOLLOW-UP: Primary | ICD-10-CM

## 2021-07-19 DIAGNOSIS — K21.9 GASTROESOPHAGEAL REFLUX DISEASE, UNSPECIFIED WHETHER ESOPHAGITIS PRESENT: ICD-10-CM

## 2021-07-19 DIAGNOSIS — D64.9 ANEMIA, UNSPECIFIED TYPE: ICD-10-CM

## 2021-07-19 DIAGNOSIS — A41.9 SEPSIS, DUE TO UNSPECIFIED ORGANISM, UNSPECIFIED WHETHER ACUTE ORGAN DYSFUNCTION PRESENT: ICD-10-CM

## 2021-07-19 DIAGNOSIS — N17.9 AKI (ACUTE KIDNEY INJURY): ICD-10-CM

## 2021-07-19 PROCEDURE — 84443 ASSAY THYROID STIM HORMONE: CPT | Performed by: NURSE PRACTITIONER

## 2021-07-19 PROCEDURE — 36415 COLL VENOUS BLD VENIPUNCTURE: CPT | Mod: PO | Performed by: NURSE PRACTITIONER

## 2021-07-19 PROCEDURE — 99214 PR OFFICE/OUTPT VISIT, EST, LEVL IV, 30-39 MIN: ICD-10-PCS | Mod: S$GLB,,, | Performed by: NURSE PRACTITIONER

## 2021-07-19 PROCEDURE — 99499 UNLISTED E&M SERVICE: CPT | Mod: HCNC,S$GLB,, | Performed by: NURSE PRACTITIONER

## 2021-07-19 PROCEDURE — 85025 COMPLETE CBC W/AUTO DIFF WBC: CPT | Performed by: NURSE PRACTITIONER

## 2021-07-19 PROCEDURE — 99499 RISK ADDL DX/OHS AUDIT: ICD-10-PCS | Mod: HCNC,S$GLB,, | Performed by: NURSE PRACTITIONER

## 2021-07-19 PROCEDURE — 1101F PR PT FALLS ASSESS DOC 0-1 FALLS W/OUT INJ PAST YR: ICD-10-PCS | Mod: CPTII,S$GLB,, | Performed by: NURSE PRACTITIONER

## 2021-07-19 PROCEDURE — 99999 PR PBB SHADOW E&M-EST. PATIENT-LVL V: ICD-10-PCS | Mod: PBBFAC,,, | Performed by: NURSE PRACTITIONER

## 2021-07-19 PROCEDURE — 83036 HEMOGLOBIN GLYCOSYLATED A1C: CPT | Performed by: NURSE PRACTITIONER

## 2021-07-19 PROCEDURE — 80069 RENAL FUNCTION PANEL: CPT | Performed by: NURSE PRACTITIONER

## 2021-07-19 PROCEDURE — 3288F FALL RISK ASSESSMENT DOCD: CPT | Mod: CPTII,S$GLB,, | Performed by: NURSE PRACTITIONER

## 2021-07-19 PROCEDURE — 3288F PR FALLS RISK ASSESSMENT DOCUMENTED: ICD-10-PCS | Mod: CPTII,S$GLB,, | Performed by: NURSE PRACTITIONER

## 2021-07-19 PROCEDURE — 3008F BODY MASS INDEX DOCD: CPT | Mod: CPTII,S$GLB,, | Performed by: NURSE PRACTITIONER

## 2021-07-19 PROCEDURE — 99214 OFFICE O/P EST MOD 30 MIN: CPT | Mod: S$GLB,,, | Performed by: NURSE PRACTITIONER

## 2021-07-19 PROCEDURE — 99999 PR PBB SHADOW E&M-EST. PATIENT-LVL V: CPT | Mod: PBBFAC,,, | Performed by: NURSE PRACTITIONER

## 2021-07-19 PROCEDURE — 1101F PT FALLS ASSESS-DOCD LE1/YR: CPT | Mod: CPTII,S$GLB,, | Performed by: NURSE PRACTITIONER

## 2021-07-19 PROCEDURE — 3008F PR BODY MASS INDEX (BMI) DOCUMENTED: ICD-10-PCS | Mod: CPTII,S$GLB,, | Performed by: NURSE PRACTITIONER

## 2021-07-19 RX ORDER — MONTELUKAST SODIUM 10 MG/1
TABLET ORAL
COMMUNITY
Start: 2021-07-12 | End: 2021-08-03

## 2021-07-19 RX ORDER — BENZONATATE 100 MG/1
CAPSULE ORAL
COMMUNITY
Start: 2021-06-23 | End: 2021-10-06

## 2021-07-19 RX ORDER — CEFDINIR 300 MG/1
CAPSULE ORAL
COMMUNITY
Start: 2021-06-23 | End: 2021-10-06

## 2021-07-19 RX ORDER — TAMSULOSIN HYDROCHLORIDE 0.4 MG/1
CAPSULE ORAL
COMMUNITY
Start: 2021-07-01 | End: 2021-10-06

## 2021-07-19 RX ORDER — PANTOPRAZOLE SODIUM 40 MG/1
TABLET, DELAYED RELEASE ORAL
COMMUNITY
Start: 2021-07-12 | End: 2021-08-03

## 2021-07-19 RX ORDER — PREDNISONE 5 MG/1
TABLET ORAL
COMMUNITY
Start: 2021-07-12 | End: 2021-10-06

## 2021-07-19 RX ORDER — FAMOTIDINE 20 MG/1
TABLET, FILM COATED ORAL
COMMUNITY
Start: 2021-07-12 | End: 2021-10-06

## 2021-07-19 RX ORDER — AZITHROMYCIN 500 MG/1
TABLET, FILM COATED ORAL
COMMUNITY
Start: 2021-06-23 | End: 2021-07-19

## 2021-07-20 LAB
ALBUMIN SERPL BCP-MCNC: 3.3 G/DL (ref 3.5–5.2)
ANION GAP SERPL CALC-SCNC: 11 MMOL/L (ref 8–16)
BASOPHILS # BLD AUTO: 0.1 K/UL (ref 0–0.2)
BASOPHILS NFR BLD: 0.8 % (ref 0–1.9)
BUN SERPL-MCNC: 14 MG/DL (ref 8–23)
CALCIUM SERPL-MCNC: 9.6 MG/DL (ref 8.7–10.5)
CHLORIDE SERPL-SCNC: 103 MMOL/L (ref 95–110)
CO2 SERPL-SCNC: 23 MMOL/L (ref 23–29)
CREAT SERPL-MCNC: 0.9 MG/DL (ref 0.5–1.4)
DIFFERENTIAL METHOD: ABNORMAL
EOSINOPHIL # BLD AUTO: 0.3 K/UL (ref 0–0.5)
EOSINOPHIL NFR BLD: 2.3 % (ref 0–8)
ERYTHROCYTE [DISTWIDTH] IN BLOOD BY AUTOMATED COUNT: 14.3 % (ref 11.5–14.5)
EST. GFR  (AFRICAN AMERICAN): >60 ML/MIN/1.73 M^2
EST. GFR  (NON AFRICAN AMERICAN): >60 ML/MIN/1.73 M^2
ESTIMATED AVG GLUCOSE: 94 MG/DL (ref 68–131)
GLUCOSE SERPL-MCNC: 101 MG/DL (ref 70–110)
HBA1C MFR BLD: 4.9 % (ref 4–5.6)
HCT VFR BLD AUTO: 33 % (ref 37–48.5)
HGB BLD-MCNC: 9.6 G/DL (ref 12–16)
IMM GRANULOCYTES # BLD AUTO: 0.32 K/UL (ref 0–0.04)
IMM GRANULOCYTES NFR BLD AUTO: 2.5 % (ref 0–0.5)
LYMPHOCYTES # BLD AUTO: 1.4 K/UL (ref 1–4.8)
LYMPHOCYTES NFR BLD: 10.6 % (ref 18–48)
MCH RBC QN AUTO: 29.4 PG (ref 27–31)
MCHC RBC AUTO-ENTMCNC: 29.1 G/DL (ref 32–36)
MCV RBC AUTO: 101 FL (ref 82–98)
MONOCYTES # BLD AUTO: 0.7 K/UL (ref 0.3–1)
MONOCYTES NFR BLD: 5.7 % (ref 4–15)
NEUTROPHILS # BLD AUTO: 10 K/UL (ref 1.8–7.7)
NEUTROPHILS NFR BLD: 78.1 % (ref 38–73)
NRBC BLD-RTO: 0 /100 WBC
PHOSPHATE SERPL-MCNC: 3.4 MG/DL (ref 2.7–4.5)
PLATELET # BLD AUTO: 301 K/UL (ref 150–450)
PMV BLD AUTO: 12.5 FL (ref 9.2–12.9)
POTASSIUM SERPL-SCNC: 4 MMOL/L (ref 3.5–5.1)
RBC # BLD AUTO: 3.27 M/UL (ref 4–5.4)
SODIUM SERPL-SCNC: 137 MMOL/L (ref 136–145)
TSH SERPL DL<=0.005 MIU/L-ACNC: 1.73 UIU/ML (ref 0.4–4)
WBC # BLD AUTO: 12.83 K/UL (ref 3.9–12.7)

## 2021-07-21 ENCOUNTER — TELEPHONE (OUTPATIENT)
Dept: FAMILY MEDICINE | Facility: CLINIC | Age: 71
End: 2021-07-21

## 2021-07-21 DIAGNOSIS — R91.1 PULMONARY NODULE: ICD-10-CM

## 2021-07-21 DIAGNOSIS — D72.829 LEUKOCYTOSIS, UNSPECIFIED TYPE: Primary | ICD-10-CM

## 2021-07-21 RX ORDER — DOXYCYCLINE HYCLATE 100 MG
100 TABLET ORAL 2 TIMES DAILY
Qty: 14 TABLET | Refills: 0 | Status: SHIPPED | OUTPATIENT
Start: 2021-07-21 | End: 2021-07-28

## 2021-07-30 ENCOUNTER — TELEPHONE (OUTPATIENT)
Dept: FAMILY MEDICINE | Facility: CLINIC | Age: 71
End: 2021-07-30

## 2021-07-30 DIAGNOSIS — K76.0 HEPATIC STEATOSIS: Primary | ICD-10-CM

## 2021-08-02 ENCOUNTER — TELEPHONE (OUTPATIENT)
Dept: FAMILY MEDICINE | Facility: CLINIC | Age: 71
End: 2021-08-02

## 2021-08-02 ENCOUNTER — LAB VISIT (OUTPATIENT)
Dept: LAB | Facility: HOSPITAL | Age: 71
End: 2021-08-02
Attending: SPECIALIST
Payer: MEDICARE

## 2021-08-02 DIAGNOSIS — R10.13 ABDOMINAL PAIN, EPIGASTRIC: Primary | ICD-10-CM

## 2021-08-02 DIAGNOSIS — M35.3 POLYMYALGIA RHEUMATICA: ICD-10-CM

## 2021-08-02 DIAGNOSIS — D72.829 LEUKOCYTOSIS, UNSPECIFIED TYPE: ICD-10-CM

## 2021-08-02 LAB — ERYTHROCYTE [SEDIMENTATION RATE] IN BLOOD BY WESTERGREN METHOD: 15 MM/HR (ref 0–20)

## 2021-08-02 PROCEDURE — 86140 C-REACTIVE PROTEIN: CPT | Performed by: SPECIALIST

## 2021-08-02 PROCEDURE — 87040 BLOOD CULTURE FOR BACTERIA: CPT | Performed by: NURSE PRACTITIONER

## 2021-08-02 PROCEDURE — 80076 HEPATIC FUNCTION PANEL: CPT | Performed by: SPECIALIST

## 2021-08-02 PROCEDURE — 36415 COLL VENOUS BLD VENIPUNCTURE: CPT | Mod: PO | Performed by: SPECIALIST

## 2021-08-02 PROCEDURE — 85651 RBC SED RATE NONAUTOMATED: CPT | Mod: PO | Performed by: SPECIALIST

## 2021-08-03 LAB
ALBUMIN SERPL BCP-MCNC: 3.9 G/DL (ref 3.5–5.2)
ALP SERPL-CCNC: 60 U/L (ref 55–135)
ALT SERPL W/O P-5'-P-CCNC: 25 U/L (ref 10–44)
AST SERPL-CCNC: 21 U/L (ref 10–40)
BILIRUB DIRECT SERPL-MCNC: 0.2 MG/DL (ref 0.1–0.3)
BILIRUB SERPL-MCNC: 0.5 MG/DL (ref 0.1–1)
CRP SERPL-MCNC: 0.4 MG/L (ref 0–8.2)
PROT SERPL-MCNC: 7.3 G/DL (ref 6–8.4)

## 2021-08-03 RX ORDER — MONTELUKAST SODIUM 10 MG/1
10 TABLET ORAL DAILY
Qty: 90 TABLET | Refills: 4 | Status: SHIPPED | OUTPATIENT
Start: 2021-08-03 | End: 2022-08-31

## 2021-08-03 RX ORDER — PANTOPRAZOLE SODIUM 40 MG/1
40 TABLET, DELAYED RELEASE ORAL DAILY
Qty: 180 TABLET | Refills: 4 | Status: SHIPPED | OUTPATIENT
Start: 2021-08-03 | End: 2021-09-29 | Stop reason: SDUPTHER

## 2021-08-05 ENCOUNTER — TELEPHONE (OUTPATIENT)
Dept: FAMILY MEDICINE | Facility: CLINIC | Age: 71
End: 2021-08-05

## 2021-08-05 DIAGNOSIS — R10.9 ABDOMINAL PAIN, UNSPECIFIED ABDOMINAL LOCATION: Primary | ICD-10-CM

## 2021-08-08 LAB — BACTERIA BLD CULT: NORMAL

## 2021-08-10 ENCOUNTER — OFFICE VISIT (OUTPATIENT)
Dept: CARDIOLOGY | Facility: CLINIC | Age: 71
End: 2021-08-10
Payer: MEDICARE

## 2021-08-10 VITALS
WEIGHT: 189.13 LBS | HEART RATE: 72 BPM | HEIGHT: 62 IN | DIASTOLIC BLOOD PRESSURE: 64 MMHG | SYSTOLIC BLOOD PRESSURE: 141 MMHG | BODY MASS INDEX: 34.8 KG/M2

## 2021-08-10 DIAGNOSIS — E78.5 DYSLIPIDEMIA: ICD-10-CM

## 2021-08-10 DIAGNOSIS — I25.119 CORONARY ARTERY DISEASE INVOLVING NATIVE CORONARY ARTERY OF NATIVE HEART WITH ANGINA PECTORIS: Primary | ICD-10-CM

## 2021-08-10 PROCEDURE — 99999 PR PBB SHADOW E&M-EST. PATIENT-LVL IV: ICD-10-PCS | Mod: PBBFAC,,, | Performed by: INTERNAL MEDICINE

## 2021-08-10 PROCEDURE — 3078F PR MOST RECENT DIASTOLIC BLOOD PRESSURE < 80 MM HG: ICD-10-PCS | Mod: CPTII,S$GLB,, | Performed by: INTERNAL MEDICINE

## 2021-08-10 PROCEDURE — 3078F DIAST BP <80 MM HG: CPT | Mod: CPTII,S$GLB,, | Performed by: INTERNAL MEDICINE

## 2021-08-10 PROCEDURE — 1159F MED LIST DOCD IN RCRD: CPT | Mod: CPTII,S$GLB,, | Performed by: INTERNAL MEDICINE

## 2021-08-10 PROCEDURE — 99999 PR PBB SHADOW E&M-EST. PATIENT-LVL IV: CPT | Mod: PBBFAC,,, | Performed by: INTERNAL MEDICINE

## 2021-08-10 PROCEDURE — 99214 OFFICE O/P EST MOD 30 MIN: CPT | Mod: S$GLB,,, | Performed by: INTERNAL MEDICINE

## 2021-08-10 PROCEDURE — 3288F PR FALLS RISK ASSESSMENT DOCUMENTED: ICD-10-PCS | Mod: CPTII,S$GLB,, | Performed by: INTERNAL MEDICINE

## 2021-08-10 PROCEDURE — 1159F PR MEDICATION LIST DOCUMENTED IN MEDICAL RECORD: ICD-10-PCS | Mod: CPTII,S$GLB,, | Performed by: INTERNAL MEDICINE

## 2021-08-10 PROCEDURE — 99499 RISK ADDL DX/OHS AUDIT: ICD-10-PCS | Mod: HCNC,S$GLB,, | Performed by: INTERNAL MEDICINE

## 2021-08-10 PROCEDURE — 1101F PT FALLS ASSESS-DOCD LE1/YR: CPT | Mod: CPTII,S$GLB,, | Performed by: INTERNAL MEDICINE

## 2021-08-10 PROCEDURE — 3008F BODY MASS INDEX DOCD: CPT | Mod: CPTII,S$GLB,, | Performed by: INTERNAL MEDICINE

## 2021-08-10 PROCEDURE — 3077F PR MOST RECENT SYSTOLIC BLOOD PRESSURE >= 140 MM HG: ICD-10-PCS | Mod: CPTII,S$GLB,, | Performed by: INTERNAL MEDICINE

## 2021-08-10 PROCEDURE — 3288F FALL RISK ASSESSMENT DOCD: CPT | Mod: CPTII,S$GLB,, | Performed by: INTERNAL MEDICINE

## 2021-08-10 PROCEDURE — 3044F HG A1C LEVEL LT 7.0%: CPT | Mod: CPTII,S$GLB,, | Performed by: INTERNAL MEDICINE

## 2021-08-10 PROCEDURE — 3077F SYST BP >= 140 MM HG: CPT | Mod: CPTII,S$GLB,, | Performed by: INTERNAL MEDICINE

## 2021-08-10 PROCEDURE — 1101F PR PT FALLS ASSESS DOC 0-1 FALLS W/OUT INJ PAST YR: ICD-10-PCS | Mod: CPTII,S$GLB,, | Performed by: INTERNAL MEDICINE

## 2021-08-10 PROCEDURE — 99214 PR OFFICE/OUTPT VISIT, EST, LEVL IV, 30-39 MIN: ICD-10-PCS | Mod: S$GLB,,, | Performed by: INTERNAL MEDICINE

## 2021-08-10 PROCEDURE — 3008F PR BODY MASS INDEX (BMI) DOCUMENTED: ICD-10-PCS | Mod: CPTII,S$GLB,, | Performed by: INTERNAL MEDICINE

## 2021-08-10 PROCEDURE — 99499 UNLISTED E&M SERVICE: CPT | Mod: HCNC,S$GLB,, | Performed by: INTERNAL MEDICINE

## 2021-08-10 PROCEDURE — 3044F PR MOST RECENT HEMOGLOBIN A1C LEVEL <7.0%: ICD-10-PCS | Mod: CPTII,S$GLB,, | Performed by: INTERNAL MEDICINE

## 2021-08-10 RX ORDER — ASCORBIC ACID 500 MG
500 TABLET ORAL DAILY
COMMUNITY

## 2021-08-12 ENCOUNTER — TELEPHONE (OUTPATIENT)
Dept: FAMILY MEDICINE | Facility: CLINIC | Age: 71
End: 2021-08-12

## 2021-08-17 ENCOUNTER — LAB VISIT (OUTPATIENT)
Dept: LAB | Facility: HOSPITAL | Age: 71
End: 2021-08-17
Attending: FAMILY MEDICINE
Payer: MEDICARE

## 2021-08-17 DIAGNOSIS — Z00.01 ENCOUNTER FOR GENERAL ADULT MEDICAL EXAMINATION WITH ABNORMAL FINDINGS: ICD-10-CM

## 2021-08-17 DIAGNOSIS — Z79.899 ENCOUNTER FOR LONG-TERM (CURRENT) USE OF MEDICATIONS: ICD-10-CM

## 2021-08-17 LAB
BASOPHILS # BLD AUTO: 0.04 K/UL (ref 0–0.2)
BASOPHILS NFR BLD: 0.5 % (ref 0–1.9)
DIFFERENTIAL METHOD: ABNORMAL
EOSINOPHIL # BLD AUTO: 0.1 K/UL (ref 0–0.5)
EOSINOPHIL NFR BLD: 1.8 % (ref 0–8)
ERYTHROCYTE [DISTWIDTH] IN BLOOD BY AUTOMATED COUNT: 14.1 % (ref 11.5–14.5)
HCT VFR BLD AUTO: 36.1 % (ref 37–48.5)
HGB BLD-MCNC: 11.3 G/DL (ref 12–16)
IMM GRANULOCYTES # BLD AUTO: 0.01 K/UL (ref 0–0.04)
IMM GRANULOCYTES NFR BLD AUTO: 0.1 % (ref 0–0.5)
LYMPHOCYTES # BLD AUTO: 2 K/UL (ref 1–4.8)
LYMPHOCYTES NFR BLD: 26.3 % (ref 18–48)
MCH RBC QN AUTO: 30.1 PG (ref 27–31)
MCHC RBC AUTO-ENTMCNC: 31.3 G/DL (ref 32–36)
MCV RBC AUTO: 96 FL (ref 82–98)
MONOCYTES # BLD AUTO: 0.6 K/UL (ref 0.3–1)
MONOCYTES NFR BLD: 7.2 % (ref 4–15)
NEUTROPHILS # BLD AUTO: 4.9 K/UL (ref 1.8–7.7)
NEUTROPHILS NFR BLD: 64.1 % (ref 38–73)
NRBC BLD-RTO: 0 /100 WBC
PLATELET # BLD AUTO: 169 K/UL (ref 150–450)
PMV BLD AUTO: 12.5 FL (ref 9.2–12.9)
RBC # BLD AUTO: 3.75 M/UL (ref 4–5.4)
WBC # BLD AUTO: 7.6 K/UL (ref 3.9–12.7)

## 2021-08-17 PROCEDURE — 36415 COLL VENOUS BLD VENIPUNCTURE: CPT | Mod: PO | Performed by: FAMILY MEDICINE

## 2021-08-17 PROCEDURE — 85025 COMPLETE CBC W/AUTO DIFF WBC: CPT | Performed by: FAMILY MEDICINE

## 2021-08-21 RX ORDER — PREDNISONE 20 MG/1
TABLET ORAL
Qty: 26 TABLET | OUTPATIENT
Start: 2021-08-21

## 2021-09-09 ENCOUNTER — LAB VISIT (OUTPATIENT)
Dept: LAB | Facility: HOSPITAL | Age: 71
End: 2021-09-09
Attending: FAMILY MEDICINE
Payer: MEDICARE

## 2021-09-09 DIAGNOSIS — K76.0 HEPATIC STEATOSIS: ICD-10-CM

## 2021-09-09 DIAGNOSIS — Z79.899 ENCOUNTER FOR LONG-TERM (CURRENT) USE OF MEDICATIONS: ICD-10-CM

## 2021-09-09 DIAGNOSIS — Z00.01 ENCOUNTER FOR GENERAL ADULT MEDICAL EXAMINATION WITH ABNORMAL FINDINGS: ICD-10-CM

## 2021-09-09 DIAGNOSIS — M35.3 POLYMYALGIA RHEUMATICA: Primary | ICD-10-CM

## 2021-09-09 DIAGNOSIS — M35.3 PMR (POLYMYALGIA RHEUMATICA): ICD-10-CM

## 2021-09-09 DIAGNOSIS — Z13.220 ENCOUNTER FOR LIPID SCREENING FOR CARDIOVASCULAR DISEASE: ICD-10-CM

## 2021-09-09 DIAGNOSIS — Z13.6 ENCOUNTER FOR LIPID SCREENING FOR CARDIOVASCULAR DISEASE: ICD-10-CM

## 2021-09-09 LAB
ALBUMIN SERPL BCP-MCNC: 3.6 G/DL (ref 3.5–5.2)
ALBUMIN SERPL BCP-MCNC: 3.6 G/DL (ref 3.5–5.2)
ALP SERPL-CCNC: 57 U/L (ref 55–135)
ALP SERPL-CCNC: 57 U/L (ref 55–135)
ALT SERPL W/O P-5'-P-CCNC: 19 U/L (ref 10–44)
ALT SERPL W/O P-5'-P-CCNC: 19 U/L (ref 10–44)
ANION GAP SERPL CALC-SCNC: 7 MMOL/L (ref 8–16)
AST SERPL-CCNC: 18 U/L (ref 10–40)
AST SERPL-CCNC: 18 U/L (ref 10–40)
BASOPHILS # BLD AUTO: 0.03 K/UL (ref 0–0.2)
BASOPHILS NFR BLD: 0.4 % (ref 0–1.9)
BILIRUB DIRECT SERPL-MCNC: 0.2 MG/DL (ref 0.1–0.3)
BILIRUB SERPL-MCNC: 0.4 MG/DL (ref 0.1–1)
BILIRUB SERPL-MCNC: 0.4 MG/DL (ref 0.1–1)
BUN SERPL-MCNC: 13 MG/DL (ref 8–23)
CALCIUM SERPL-MCNC: 9.2 MG/DL (ref 8.7–10.5)
CHLORIDE SERPL-SCNC: 109 MMOL/L (ref 95–110)
CHOLEST SERPL-MCNC: 127 MG/DL (ref 120–199)
CHOLEST/HDLC SERPL: 2.4 {RATIO} (ref 2–5)
CO2 SERPL-SCNC: 26 MMOL/L (ref 23–29)
CREAT SERPL-MCNC: 0.8 MG/DL (ref 0.5–1.4)
CRP SERPL-MCNC: 0.8 MG/L (ref 0–8.2)
DIFFERENTIAL METHOD: ABNORMAL
EOSINOPHIL # BLD AUTO: 0.1 K/UL (ref 0–0.5)
EOSINOPHIL NFR BLD: 1.2 % (ref 0–8)
ERYTHROCYTE [DISTWIDTH] IN BLOOD BY AUTOMATED COUNT: 13.7 % (ref 11.5–14.5)
ERYTHROCYTE [SEDIMENTATION RATE] IN BLOOD BY WESTERGREN METHOD: 15 MM/HR (ref 0–20)
EST. GFR  (AFRICAN AMERICAN): >60 ML/MIN/1.73 M^2
EST. GFR  (NON AFRICAN AMERICAN): >60 ML/MIN/1.73 M^2
ESTIMATED AVG GLUCOSE: 105 MG/DL (ref 68–131)
GLUCOSE SERPL-MCNC: 80 MG/DL (ref 70–110)
HBA1C MFR BLD: 5.3 % (ref 4–5.6)
HCT VFR BLD AUTO: 38 % (ref 37–48.5)
HDLC SERPL-MCNC: 54 MG/DL (ref 40–75)
HDLC SERPL: 42.5 % (ref 20–50)
HGB BLD-MCNC: 11.9 G/DL (ref 12–16)
IMM GRANULOCYTES # BLD AUTO: 0.02 K/UL (ref 0–0.04)
IMM GRANULOCYTES NFR BLD AUTO: 0.3 % (ref 0–0.5)
LDLC SERPL CALC-MCNC: 58.4 MG/DL (ref 63–159)
LYMPHOCYTES # BLD AUTO: 1.6 K/UL (ref 1–4.8)
LYMPHOCYTES NFR BLD: 23.2 % (ref 18–48)
MCH RBC QN AUTO: 30.3 PG (ref 27–31)
MCHC RBC AUTO-ENTMCNC: 31.3 G/DL (ref 32–36)
MCV RBC AUTO: 97 FL (ref 82–98)
MONOCYTES # BLD AUTO: 0.6 K/UL (ref 0.3–1)
MONOCYTES NFR BLD: 8.1 % (ref 4–15)
NEUTROPHILS # BLD AUTO: 4.7 K/UL (ref 1.8–7.7)
NEUTROPHILS NFR BLD: 66.8 % (ref 38–73)
NONHDLC SERPL-MCNC: 73 MG/DL
NRBC BLD-RTO: 0 /100 WBC
PLATELET # BLD AUTO: 171 K/UL (ref 150–450)
PMV BLD AUTO: 12.6 FL (ref 9.2–12.9)
POTASSIUM SERPL-SCNC: 3.7 MMOL/L (ref 3.5–5.1)
PROT SERPL-MCNC: 6.8 G/DL (ref 6–8.4)
PROT SERPL-MCNC: 6.8 G/DL (ref 6–8.4)
RBC # BLD AUTO: 3.93 M/UL (ref 4–5.4)
SODIUM SERPL-SCNC: 142 MMOL/L (ref 136–145)
TRIGL SERPL-MCNC: 73 MG/DL (ref 30–150)
TSH SERPL DL<=0.005 MIU/L-ACNC: 2.58 UIU/ML (ref 0.4–4)
WBC # BLD AUTO: 6.95 K/UL (ref 3.9–12.7)

## 2021-09-09 PROCEDURE — 85651 RBC SED RATE NONAUTOMATED: CPT | Mod: PO | Performed by: FAMILY MEDICINE

## 2021-09-09 PROCEDURE — 85025 COMPLETE CBC W/AUTO DIFF WBC: CPT | Performed by: FAMILY MEDICINE

## 2021-09-09 PROCEDURE — 86140 C-REACTIVE PROTEIN: CPT | Performed by: INTERNAL MEDICINE

## 2021-09-09 PROCEDURE — 80061 LIPID PANEL: CPT | Performed by: FAMILY MEDICINE

## 2021-09-09 PROCEDURE — 84443 ASSAY THYROID STIM HORMONE: CPT | Performed by: FAMILY MEDICINE

## 2021-09-09 PROCEDURE — 83036 HEMOGLOBIN GLYCOSYLATED A1C: CPT | Performed by: FAMILY MEDICINE

## 2021-09-09 PROCEDURE — 80053 COMPREHEN METABOLIC PANEL: CPT | Performed by: FAMILY MEDICINE

## 2021-09-09 PROCEDURE — 36415 COLL VENOUS BLD VENIPUNCTURE: CPT | Mod: PO | Performed by: FAMILY MEDICINE

## 2021-09-19 RX ORDER — GABAPENTIN 300 MG/1
CAPSULE ORAL
Qty: 90 CAPSULE | Refills: 12 | Status: SHIPPED | OUTPATIENT
Start: 2021-09-19 | End: 2023-01-18

## 2021-09-28 ENCOUNTER — NURSE TRIAGE (OUTPATIENT)
Dept: ADMINISTRATIVE | Facility: CLINIC | Age: 71
End: 2021-09-28

## 2021-09-29 RX ORDER — PANTOPRAZOLE SODIUM 40 MG/1
40 TABLET, DELAYED RELEASE ORAL 2 TIMES DAILY
Qty: 180 TABLET | Refills: 0 | Status: SHIPPED | OUTPATIENT
Start: 2021-09-29 | End: 2021-12-27

## 2021-10-06 ENCOUNTER — OFFICE VISIT (OUTPATIENT)
Dept: FAMILY MEDICINE | Facility: CLINIC | Age: 71
End: 2021-10-06
Payer: MEDICARE

## 2021-10-06 VITALS
DIASTOLIC BLOOD PRESSURE: 67 MMHG | TEMPERATURE: 98 F | HEART RATE: 71 BPM | HEIGHT: 62 IN | BODY MASS INDEX: 33.31 KG/M2 | SYSTOLIC BLOOD PRESSURE: 125 MMHG | WEIGHT: 181 LBS

## 2021-10-06 DIAGNOSIS — Z79.899 ENCOUNTER FOR LONG-TERM (CURRENT) USE OF MEDICATIONS: ICD-10-CM

## 2021-10-06 DIAGNOSIS — Z00.00 WELL ADULT EXAM: Primary | ICD-10-CM

## 2021-10-06 DIAGNOSIS — Z13.220 ENCOUNTER FOR LIPID SCREENING FOR CARDIOVASCULAR DISEASE: ICD-10-CM

## 2021-10-06 DIAGNOSIS — K21.00 GASTROESOPHAGEAL REFLUX DISEASE WITH ESOPHAGITIS WITHOUT HEMORRHAGE: ICD-10-CM

## 2021-10-06 DIAGNOSIS — E03.9 ACQUIRED HYPOTHYROIDISM: ICD-10-CM

## 2021-10-06 DIAGNOSIS — Z13.6 ENCOUNTER FOR LIPID SCREENING FOR CARDIOVASCULAR DISEASE: ICD-10-CM

## 2021-10-06 DIAGNOSIS — M35.3 POLYMYALGIA RHEUMATICA: ICD-10-CM

## 2021-10-06 PROCEDURE — 99999 PR PBB SHADOW E&M-EST. PATIENT-LVL IV: CPT | Mod: PBBFAC,,, | Performed by: FAMILY MEDICINE

## 2021-10-06 PROCEDURE — 3044F PR MOST RECENT HEMOGLOBIN A1C LEVEL <7.0%: ICD-10-PCS | Mod: CPTII,S$GLB,, | Performed by: FAMILY MEDICINE

## 2021-10-06 PROCEDURE — 99499 UNLISTED E&M SERVICE: CPT | Mod: HCNC,S$GLB,, | Performed by: FAMILY MEDICINE

## 2021-10-06 PROCEDURE — 99397 PR PREVENTIVE VISIT,EST,65 & OVER: ICD-10-PCS | Mod: S$GLB,,, | Performed by: FAMILY MEDICINE

## 2021-10-06 PROCEDURE — 99397 PER PM REEVAL EST PAT 65+ YR: CPT | Mod: S$GLB,,, | Performed by: FAMILY MEDICINE

## 2021-10-06 PROCEDURE — 3074F SYST BP LT 130 MM HG: CPT | Mod: CPTII,S$GLB,, | Performed by: FAMILY MEDICINE

## 2021-10-06 PROCEDURE — 3008F PR BODY MASS INDEX (BMI) DOCUMENTED: ICD-10-PCS | Mod: CPTII,S$GLB,, | Performed by: FAMILY MEDICINE

## 2021-10-06 PROCEDURE — 3288F FALL RISK ASSESSMENT DOCD: CPT | Mod: CPTII,S$GLB,, | Performed by: FAMILY MEDICINE

## 2021-10-06 PROCEDURE — 99999 PR PBB SHADOW E&M-EST. PATIENT-LVL IV: ICD-10-PCS | Mod: PBBFAC,,, | Performed by: FAMILY MEDICINE

## 2021-10-06 PROCEDURE — 1159F PR MEDICATION LIST DOCUMENTED IN MEDICAL RECORD: ICD-10-PCS | Mod: CPTII,S$GLB,, | Performed by: FAMILY MEDICINE

## 2021-10-06 PROCEDURE — 3078F DIAST BP <80 MM HG: CPT | Mod: CPTII,S$GLB,, | Performed by: FAMILY MEDICINE

## 2021-10-06 PROCEDURE — 3078F PR MOST RECENT DIASTOLIC BLOOD PRESSURE < 80 MM HG: ICD-10-PCS | Mod: CPTII,S$GLB,, | Performed by: FAMILY MEDICINE

## 2021-10-06 PROCEDURE — 1101F PR PT FALLS ASSESS DOC 0-1 FALLS W/OUT INJ PAST YR: ICD-10-PCS | Mod: CPTII,S$GLB,, | Performed by: FAMILY MEDICINE

## 2021-10-06 PROCEDURE — 1126F PR PAIN SEVERITY QUANTIFIED, NO PAIN PRESENT: ICD-10-PCS | Mod: CPTII,S$GLB,, | Performed by: FAMILY MEDICINE

## 2021-10-06 PROCEDURE — 1159F MED LIST DOCD IN RCRD: CPT | Mod: CPTII,S$GLB,, | Performed by: FAMILY MEDICINE

## 2021-10-06 PROCEDURE — 1160F RVW MEDS BY RX/DR IN RCRD: CPT | Mod: CPTII,S$GLB,, | Performed by: FAMILY MEDICINE

## 2021-10-06 PROCEDURE — 1126F AMNT PAIN NOTED NONE PRSNT: CPT | Mod: CPTII,S$GLB,, | Performed by: FAMILY MEDICINE

## 2021-10-06 PROCEDURE — 1160F PR REVIEW ALL MEDS BY PRESCRIBER/CLIN PHARMACIST DOCUMENTED: ICD-10-PCS | Mod: CPTII,S$GLB,, | Performed by: FAMILY MEDICINE

## 2021-10-06 PROCEDURE — 1101F PT FALLS ASSESS-DOCD LE1/YR: CPT | Mod: CPTII,S$GLB,, | Performed by: FAMILY MEDICINE

## 2021-10-06 PROCEDURE — 3074F PR MOST RECENT SYSTOLIC BLOOD PRESSURE < 130 MM HG: ICD-10-PCS | Mod: CPTII,S$GLB,, | Performed by: FAMILY MEDICINE

## 2021-10-06 PROCEDURE — 99499 RISK ADDL DX/OHS AUDIT: ICD-10-PCS | Mod: HCNC,S$GLB,, | Performed by: FAMILY MEDICINE

## 2021-10-06 PROCEDURE — 3044F HG A1C LEVEL LT 7.0%: CPT | Mod: CPTII,S$GLB,, | Performed by: FAMILY MEDICINE

## 2021-10-06 PROCEDURE — 3008F BODY MASS INDEX DOCD: CPT | Mod: CPTII,S$GLB,, | Performed by: FAMILY MEDICINE

## 2021-10-06 PROCEDURE — 3288F PR FALLS RISK ASSESSMENT DOCUMENTED: ICD-10-PCS | Mod: CPTII,S$GLB,, | Performed by: FAMILY MEDICINE

## 2021-10-06 RX ORDER — DICLOFENAC SODIUM 75 MG/1
75 TABLET, DELAYED RELEASE ORAL DAILY
COMMUNITY
Start: 2021-09-15

## 2021-10-08 ENCOUNTER — DOCUMENT SCAN (OUTPATIENT)
Dept: HOME HEALTH SERVICES | Facility: HOSPITAL | Age: 71
End: 2021-10-08
Payer: MEDICARE

## 2021-10-11 ENCOUNTER — EXTERNAL HOME HEALTH (OUTPATIENT)
Dept: HOME HEALTH SERVICES | Facility: HOSPITAL | Age: 71
End: 2021-10-11

## 2021-10-15 ENCOUNTER — TELEPHONE (OUTPATIENT)
Dept: FAMILY MEDICINE | Facility: CLINIC | Age: 71
End: 2021-10-15

## 2021-10-28 ENCOUNTER — IMMUNIZATION (OUTPATIENT)
Dept: FAMILY MEDICINE | Facility: CLINIC | Age: 71
End: 2021-10-28
Payer: MEDICARE

## 2021-10-28 DIAGNOSIS — Z23 NEED FOR VACCINATION: Primary | ICD-10-CM

## 2021-10-28 PROCEDURE — 0003A COVID-19, MRNA, LNP-S, PF, 30 MCG/0.3 ML DOSE VACCINE: CPT | Mod: HCNC,PBBFAC | Performed by: INTERNAL MEDICINE

## 2021-10-28 PROCEDURE — 91300 COVID-19, MRNA, LNP-S, PF, 30 MCG/0.3 ML DOSE VACCINE: CPT | Mod: HCNC,PBBFAC | Performed by: INTERNAL MEDICINE

## 2021-11-05 DIAGNOSIS — K21.00 GASTROESOPHAGEAL REFLUX DISEASE WITH ESOPHAGITIS WITHOUT HEMORRHAGE: Primary | ICD-10-CM

## 2021-11-06 RX ORDER — FAMOTIDINE 20 MG/1
20 TABLET, FILM COATED ORAL 2 TIMES DAILY PRN
Qty: 180 TABLET | Refills: 1 | Status: SHIPPED | OUTPATIENT
Start: 2021-11-06 | End: 2022-05-26

## 2021-11-08 ENCOUNTER — EXTERNAL HOME HEALTH (OUTPATIENT)
Dept: HOME HEALTH SERVICES | Facility: HOSPITAL | Age: 71
End: 2021-11-08
Payer: MEDICARE

## 2021-11-10 ENCOUNTER — OFFICE VISIT (OUTPATIENT)
Dept: CARDIOLOGY | Facility: CLINIC | Age: 71
End: 2021-11-10
Payer: MEDICARE

## 2021-11-10 VITALS
BODY MASS INDEX: 33.87 KG/M2 | HEIGHT: 62 IN | DIASTOLIC BLOOD PRESSURE: 79 MMHG | HEART RATE: 70 BPM | SYSTOLIC BLOOD PRESSURE: 174 MMHG | WEIGHT: 184.06 LBS

## 2021-11-10 DIAGNOSIS — I25.119 CORONARY ARTERY DISEASE INVOLVING NATIVE CORONARY ARTERY OF NATIVE HEART WITH ANGINA PECTORIS: Primary | ICD-10-CM

## 2021-11-10 DIAGNOSIS — E78.5 DYSLIPIDEMIA: ICD-10-CM

## 2021-11-10 PROCEDURE — 1159F PR MEDICATION LIST DOCUMENTED IN MEDICAL RECORD: ICD-10-PCS | Mod: HCNC,CPTII,S$GLB, | Performed by: INTERNAL MEDICINE

## 2021-11-10 PROCEDURE — 99214 PR OFFICE/OUTPT VISIT, EST, LEVL IV, 30-39 MIN: ICD-10-PCS | Mod: HCNC,S$GLB,, | Performed by: INTERNAL MEDICINE

## 2021-11-10 PROCEDURE — 3078F PR MOST RECENT DIASTOLIC BLOOD PRESSURE < 80 MM HG: ICD-10-PCS | Mod: HCNC,CPTII,S$GLB, | Performed by: INTERNAL MEDICINE

## 2021-11-10 PROCEDURE — 3077F SYST BP >= 140 MM HG: CPT | Mod: HCNC,CPTII,S$GLB, | Performed by: INTERNAL MEDICINE

## 2021-11-10 PROCEDURE — 1160F RVW MEDS BY RX/DR IN RCRD: CPT | Mod: HCNC,CPTII,S$GLB, | Performed by: INTERNAL MEDICINE

## 2021-11-10 PROCEDURE — 3008F PR BODY MASS INDEX (BMI) DOCUMENTED: ICD-10-PCS | Mod: HCNC,CPTII,S$GLB, | Performed by: INTERNAL MEDICINE

## 2021-11-10 PROCEDURE — 3008F BODY MASS INDEX DOCD: CPT | Mod: HCNC,CPTII,S$GLB, | Performed by: INTERNAL MEDICINE

## 2021-11-10 PROCEDURE — 1160F PR REVIEW ALL MEDS BY PRESCRIBER/CLIN PHARMACIST DOCUMENTED: ICD-10-PCS | Mod: HCNC,CPTII,S$GLB, | Performed by: INTERNAL MEDICINE

## 2021-11-10 PROCEDURE — 3044F PR MOST RECENT HEMOGLOBIN A1C LEVEL <7.0%: ICD-10-PCS | Mod: HCNC,CPTII,S$GLB, | Performed by: INTERNAL MEDICINE

## 2021-11-10 PROCEDURE — 99214 OFFICE O/P EST MOD 30 MIN: CPT | Mod: HCNC,S$GLB,, | Performed by: INTERNAL MEDICINE

## 2021-11-10 PROCEDURE — 3044F HG A1C LEVEL LT 7.0%: CPT | Mod: HCNC,CPTII,S$GLB, | Performed by: INTERNAL MEDICINE

## 2021-11-10 PROCEDURE — 1126F PR PAIN SEVERITY QUANTIFIED, NO PAIN PRESENT: ICD-10-PCS | Mod: HCNC,CPTII,S$GLB, | Performed by: INTERNAL MEDICINE

## 2021-11-10 PROCEDURE — 99999 PR PBB SHADOW E&M-EST. PATIENT-LVL III: CPT | Mod: PBBFAC,HCNC,, | Performed by: INTERNAL MEDICINE

## 2021-11-10 PROCEDURE — 3077F PR MOST RECENT SYSTOLIC BLOOD PRESSURE >= 140 MM HG: ICD-10-PCS | Mod: HCNC,CPTII,S$GLB, | Performed by: INTERNAL MEDICINE

## 2021-11-10 PROCEDURE — 1159F MED LIST DOCD IN RCRD: CPT | Mod: HCNC,CPTII,S$GLB, | Performed by: INTERNAL MEDICINE

## 2021-11-10 PROCEDURE — 1126F AMNT PAIN NOTED NONE PRSNT: CPT | Mod: HCNC,CPTII,S$GLB, | Performed by: INTERNAL MEDICINE

## 2021-11-10 PROCEDURE — 99999 PR PBB SHADOW E&M-EST. PATIENT-LVL III: ICD-10-PCS | Mod: PBBFAC,HCNC,, | Performed by: INTERNAL MEDICINE

## 2021-11-10 PROCEDURE — 3078F DIAST BP <80 MM HG: CPT | Mod: HCNC,CPTII,S$GLB, | Performed by: INTERNAL MEDICINE

## 2021-12-07 ENCOUNTER — LAB VISIT (OUTPATIENT)
Dept: LAB | Facility: HOSPITAL | Age: 71
End: 2021-12-07
Attending: INTERNAL MEDICINE
Payer: MEDICARE

## 2021-12-07 DIAGNOSIS — Z79.52 LONG TERM CURRENT USE OF SYSTEMIC STEROIDS: ICD-10-CM

## 2021-12-07 DIAGNOSIS — M35.3 POLYMYALGIA RHEUMATICA: Primary | ICD-10-CM

## 2021-12-07 LAB
ALBUMIN SERPL BCP-MCNC: 3.8 G/DL (ref 3.5–5.2)
ALP SERPL-CCNC: 86 U/L (ref 55–135)
ALT SERPL W/O P-5'-P-CCNC: 19 U/L (ref 10–44)
ANION GAP SERPL CALC-SCNC: 9 MMOL/L (ref 8–16)
AST SERPL-CCNC: 16 U/L (ref 10–40)
BILIRUB SERPL-MCNC: 0.4 MG/DL (ref 0.1–1)
BUN SERPL-MCNC: 12 MG/DL (ref 8–23)
CALCIUM SERPL-MCNC: 9.6 MG/DL (ref 8.7–10.5)
CHLORIDE SERPL-SCNC: 105 MMOL/L (ref 95–110)
CO2 SERPL-SCNC: 26 MMOL/L (ref 23–29)
CREAT SERPL-MCNC: 0.8 MG/DL (ref 0.5–1.4)
CRP SERPL-MCNC: 1.9 MG/L (ref 0–8.2)
ERYTHROCYTE [SEDIMENTATION RATE] IN BLOOD BY WESTERGREN METHOD: 32 MM/HR (ref 0–20)
EST. GFR  (AFRICAN AMERICAN): >60 ML/MIN/1.73 M^2
EST. GFR  (NON AFRICAN AMERICAN): >60 ML/MIN/1.73 M^2
GLUCOSE SERPL-MCNC: 101 MG/DL (ref 70–110)
POTASSIUM SERPL-SCNC: 4 MMOL/L (ref 3.5–5.1)
PROT SERPL-MCNC: 7.1 G/DL (ref 6–8.4)
SODIUM SERPL-SCNC: 140 MMOL/L (ref 136–145)

## 2021-12-07 PROCEDURE — 85651 RBC SED RATE NONAUTOMATED: CPT | Mod: HCNC,PO | Performed by: INTERNAL MEDICINE

## 2021-12-07 PROCEDURE — 36415 COLL VENOUS BLD VENIPUNCTURE: CPT | Mod: HCNC,PO | Performed by: INTERNAL MEDICINE

## 2021-12-07 PROCEDURE — 80053 COMPREHEN METABOLIC PANEL: CPT | Mod: HCNC | Performed by: INTERNAL MEDICINE

## 2021-12-07 PROCEDURE — 86140 C-REACTIVE PROTEIN: CPT | Mod: HCNC | Performed by: INTERNAL MEDICINE

## 2022-01-10 ENCOUNTER — PATIENT MESSAGE (OUTPATIENT)
Dept: ENDOSCOPY | Facility: HOSPITAL | Age: 72
End: 2022-01-10
Payer: MEDICARE

## 2022-01-11 ENCOUNTER — LAB VISIT (OUTPATIENT)
Dept: LAB | Facility: HOSPITAL | Age: 72
End: 2022-01-11
Attending: NURSE PRACTITIONER
Payer: MEDICARE

## 2022-01-11 ENCOUNTER — OFFICE VISIT (OUTPATIENT)
Dept: FAMILY MEDICINE | Facility: CLINIC | Age: 72
End: 2022-01-11
Payer: MEDICARE

## 2022-01-11 VITALS
WEIGHT: 182.69 LBS | HEART RATE: 80 BPM | HEIGHT: 62 IN | TEMPERATURE: 98 F | DIASTOLIC BLOOD PRESSURE: 70 MMHG | SYSTOLIC BLOOD PRESSURE: 128 MMHG | BODY MASS INDEX: 33.62 KG/M2

## 2022-01-11 DIAGNOSIS — R42 VERTIGO: Primary | ICD-10-CM

## 2022-01-11 DIAGNOSIS — R42 VERTIGO: ICD-10-CM

## 2022-01-11 LAB
ERYTHROCYTE [DISTWIDTH] IN BLOOD BY AUTOMATED COUNT: 12.9 % (ref 11.5–14.5)
HCT VFR BLD AUTO: 38.8 % (ref 37–48.5)
HGB BLD-MCNC: 12.5 G/DL (ref 12–16)
MCH RBC QN AUTO: 30.9 PG (ref 27–31)
MCHC RBC AUTO-ENTMCNC: 32.2 G/DL (ref 32–36)
MCV RBC AUTO: 96 FL (ref 82–98)
PLATELET # BLD AUTO: 204 K/UL (ref 150–450)
PMV BLD AUTO: 12.5 FL (ref 9.2–12.9)
RBC # BLD AUTO: 4.05 M/UL (ref 4–5.4)
WBC # BLD AUTO: 9.49 K/UL (ref 3.9–12.7)

## 2022-01-11 PROCEDURE — 3078F PR MOST RECENT DIASTOLIC BLOOD PRESSURE < 80 MM HG: ICD-10-PCS | Mod: HCNC,CPTII,S$GLB, | Performed by: NURSE PRACTITIONER

## 2022-01-11 PROCEDURE — 3008F BODY MASS INDEX DOCD: CPT | Mod: HCNC,CPTII,S$GLB, | Performed by: NURSE PRACTITIONER

## 2022-01-11 PROCEDURE — 99213 OFFICE O/P EST LOW 20 MIN: CPT | Mod: HCNC,S$GLB,, | Performed by: NURSE PRACTITIONER

## 2022-01-11 PROCEDURE — 3288F PR FALLS RISK ASSESSMENT DOCUMENTED: ICD-10-PCS | Mod: HCNC,CPTII,S$GLB, | Performed by: NURSE PRACTITIONER

## 2022-01-11 PROCEDURE — 3074F SYST BP LT 130 MM HG: CPT | Mod: HCNC,CPTII,S$GLB, | Performed by: NURSE PRACTITIONER

## 2022-01-11 PROCEDURE — 99999 PR PBB SHADOW E&M-EST. PATIENT-LVL IV: ICD-10-PCS | Mod: PBBFAC,HCNC,, | Performed by: NURSE PRACTITIONER

## 2022-01-11 PROCEDURE — 3288F FALL RISK ASSESSMENT DOCD: CPT | Mod: HCNC,CPTII,S$GLB, | Performed by: NURSE PRACTITIONER

## 2022-01-11 PROCEDURE — 99213 PR OFFICE/OUTPT VISIT, EST, LEVL III, 20-29 MIN: ICD-10-PCS | Mod: HCNC,S$GLB,, | Performed by: NURSE PRACTITIONER

## 2022-01-11 PROCEDURE — 1101F PT FALLS ASSESS-DOCD LE1/YR: CPT | Mod: HCNC,CPTII,S$GLB, | Performed by: NURSE PRACTITIONER

## 2022-01-11 PROCEDURE — 36415 COLL VENOUS BLD VENIPUNCTURE: CPT | Mod: HCNC,PO | Performed by: NURSE PRACTITIONER

## 2022-01-11 PROCEDURE — 85027 COMPLETE CBC AUTOMATED: CPT | Mod: HCNC | Performed by: NURSE PRACTITIONER

## 2022-01-11 PROCEDURE — 3078F DIAST BP <80 MM HG: CPT | Mod: HCNC,CPTII,S$GLB, | Performed by: NURSE PRACTITIONER

## 2022-01-11 PROCEDURE — 3074F PR MOST RECENT SYSTOLIC BLOOD PRESSURE < 130 MM HG: ICD-10-PCS | Mod: HCNC,CPTII,S$GLB, | Performed by: NURSE PRACTITIONER

## 2022-01-11 PROCEDURE — 3008F PR BODY MASS INDEX (BMI) DOCUMENTED: ICD-10-PCS | Mod: HCNC,CPTII,S$GLB, | Performed by: NURSE PRACTITIONER

## 2022-01-11 PROCEDURE — 1101F PR PT FALLS ASSESS DOC 0-1 FALLS W/OUT INJ PAST YR: ICD-10-PCS | Mod: HCNC,CPTII,S$GLB, | Performed by: NURSE PRACTITIONER

## 2022-01-11 PROCEDURE — 99999 PR PBB SHADOW E&M-EST. PATIENT-LVL IV: CPT | Mod: PBBFAC,HCNC,, | Performed by: NURSE PRACTITIONER

## 2022-01-11 RX ORDER — MECLIZINE HYDROCHLORIDE 25 MG/1
25 TABLET ORAL 3 TIMES DAILY PRN
Qty: 30 TABLET | Refills: 0 | Status: SHIPPED | OUTPATIENT
Start: 2022-01-11 | End: 2022-09-07

## 2022-01-11 RX ORDER — FLUTICASONE PROPIONATE 50 MCG
1 SPRAY, SUSPENSION (ML) NASAL DAILY
Qty: 18.2 ML | Refills: 0 | Status: SHIPPED | OUTPATIENT
Start: 2022-01-11 | End: 2022-01-11

## 2022-01-11 NOTE — PROGRESS NOTES
Assessment/Plan:  Problem List Items Addressed This Visit        ENT    Vertigo - Primary    Overview     Patient is in clinic today as an established patient here for dizziness. Onset x3 days ago. She reports worse with certain changes in position and rapid head movements. Associated symptoms includes mild ear pressure and congestion. She has had no change in recent activity. Denies recent injury or falls. She has had no fever, chills, sweats. She denies neck pain, visual disturbances, headaches, confusion, weakness. VSS. Blood pressure WNL. She does report history of anemia and would like to recheck blood work.     Will repeat CBC. Recommend trial of Meclizine. She has active Rx for Flonase- recommend use Flonase daily as prescribed. Consider referral to ENT if no improvement in symptoms.          Relevant Medications    meclizine (ANTIVERT) 25 mg tablet    Other Relevant Orders    CBC Without Differential (Completed)        Follow up in about 1 week (around 1/18/2022), or if symptoms worsen or fail to improve, for Follow up with Dr. Carter.    Violeta Nicole, MARCELL  _____________________________________________________________________________________________________________________________________________________    CC: Dizziness     HPI: Patient is in clinic today as an established patient here for dizziness. Onset x3 days ago. She reports worse with certain changes in position and rapid head movements. Associated symptoms includes mild ear pressure and congestion. She has had no change in recent activity. Denies recent injury or falls. She has had no fever, chills, sweats. She denies neck pain, visual disturbances, headaches, confusion, weakness. She does report history of anemia and would like to recheck blood work.     Past Medical History:  Past Medical History:   Diagnosis Date    Acquired hypothyroidism 10/6/2021    Angina pectoris     Anticoagulant long-term use     CAD (coronary artery disease)      with stents x 3    GERD (gastroesophageal reflux disease)      Past Surgical History:   Procedure Laterality Date    2006 2 stents      2009 1 stent      CHOLECYSTECTOMY  2021    Dr. Roach    ESOPHAGOGASTRODUODENOSCOPY N/A 11/28/2018    Procedure: EGD (ESOPHAGOGASTRODUODENOSCOPY);  Surgeon: Marvin Alvarado MD;  Location: [facility];  Service: Endoscopy;  Laterality: N/A;    WRIST FRACTURE SURGERY  03/2018     Review of patient's allergies indicates:   Allergen Reactions    Sulfa (sulfonamide antibiotics) Shortness Of Breath     Social History     Tobacco Use    Smoking status: Never Smoker    Smokeless tobacco: Never Used   Substance Use Topics    Alcohol use: No    Drug use: No     Family History   Problem Relation Age of Onset    Heart disease Father      Current Outpatient Medications on File Prior to Visit   Medication Sig Dispense Refill    ascorbic acid, vitamin C, (VITAMIN C) 500 MG tablet Take 500 mg by mouth once daily.      aspirin (ECOTRIN) 81 MG EC tablet Take 81 mg by mouth once daily.      atorvastatin (LIPITOR) 40 MG tablet TAKE 1 TABLET EVERY DAY 90 tablet 2    calcium-vitamin D3 (OS-SHEILA 500 + D3) 500 mg(1,250mg) -200 unit per tablet Take 1 tablet by mouth 2 (two) times daily with meals.      diclofenac (VOLTAREN) 75 MG EC tablet Take 75 mg by mouth once daily.      famotidine (PEPCID) 20 MG tablet Take 1 tablet (20 mg total) by mouth 2 (two) times daily as needed for Heartburn. 180 tablet 1    gabapentin (NEURONTIN) 300 MG capsule TAKE 3 CAPSULES EVERY EVENING 90 capsule 12    levothyroxine (SYNTHROID) 25 MCG tablet Take 1 tablet (25 mcg total) by mouth once daily. Repeat a TSH in my office in 2 months. 90 tablet 4    montelukast (SINGULAIR) 10 mg tablet Take 1 tablet (10 mg total) by mouth once daily. 90 tablet 4    multivitamin (THERAGRAN) per tablet Take 1 tablet by mouth once daily.      nitroGLYCERIN (NITROSTAT) 0.4 MG SL tablet Place 0.4 mg under the tongue.       "pantoprazole (PROTONIX) 40 MG tablet TAKE 1 TABLET(40 MG) BY MOUTH TWICE DAILY 180 tablet 0    predniSONE (DELTASONE) 20 MG tablet Take 1 tablet (20 mg total) by mouth once daily. (Patient taking differently: Take 7.5 mg by mouth once daily.) 30 tablet 1     No current facility-administered medications on file prior to visit.     Review of Systems   Constitutional: Negative for appetite change, chills, fatigue and fever.   HENT: Negative for congestion, ear pain, rhinorrhea and sore throat.    Eyes: Negative for visual disturbance.   Respiratory: Negative for cough and shortness of breath.    Cardiovascular: Negative for chest pain, palpitations and leg swelling.   Gastrointestinal: Negative for abdominal pain, diarrhea and vomiting.   Genitourinary: Negative for difficulty urinating, dysuria and hematuria.   Musculoskeletal: Negative for arthralgias and myalgias.   Skin: Negative for rash and wound.   Neurological: Positive for dizziness. Negative for seizures, syncope, speech difficulty, weakness, light-headedness, numbness and headaches.   Psychiatric/Behavioral: Negative for behavioral problems and confusion. The patient is not nervous/anxious.      Vitals:    01/11/22 1429   BP: 128/70   BP Location: Right arm   Patient Position: Sitting   Pulse: 80   Temp: 97.5 °F (36.4 °C)   TempSrc: Skin   Weight: 82.9 kg (182 lb 11.2 oz)   Height: 5' 2" (1.575 m)     Wt Readings from Last 3 Encounters:   01/11/22 82.9 kg (182 lb 11.2 oz)   11/10/21 83.5 kg (184 lb 1.4 oz)   10/06/21 82.1 kg (181 lb)     Physical Exam  Vitals reviewed.   Constitutional:       General: She is not in acute distress.     Appearance: Normal appearance. She is not ill-appearing.   HENT:      Head: Normocephalic and atraumatic.      Right Ear: Ear canal and external ear normal. A middle ear effusion is present. Tympanic membrane is not erythematous.      Left Ear: Ear canal and external ear normal. A middle ear effusion is present. Tympanic " membrane is not erythematous.   Eyes:      Extraocular Movements: Extraocular movements intact.      Conjunctiva/sclera: Conjunctivae normal.      Pupils: Pupils are equal, round, and reactive to light.      Comments: Pupils 3 mm bilaterally, PERRLA    Cardiovascular:      Rate and Rhythm: Normal rate.      Heart sounds: Normal heart sounds.   Pulmonary:      Effort: Pulmonary effort is normal. No respiratory distress.      Breath sounds: Normal breath sounds.   Abdominal:      General: Abdomen is flat. There is no distension.   Musculoskeletal:         General: Normal range of motion.      Cervical back: Normal range of motion.   Skin:     General: Skin is warm and dry.      Capillary Refill: Capillary refill takes less than 2 seconds.      Coloration: Skin is not pale.      Findings: No rash.   Neurological:      Mental Status: She is alert and oriented to person, place, and time. Mental status is at baseline.      GCS: GCS eye subscore is 4. GCS verbal subscore is 5. GCS motor subscore is 6.      Sensory: Sensation is intact.      Motor: Motor function is intact. No weakness.      Coordination: Coordination is intact. Coordination normal.      Gait: Gait is intact. Gait normal.      Comments: Strong hand  bilaterally    Psychiatric:         Attention and Perception: Attention normal.         Mood and Affect: Mood normal.         Speech: Speech normal.         Behavior: Behavior normal. Behavior is cooperative.         Thought Content: Thought content normal.         Cognition and Memory: Cognition normal.         Judgment: Judgment normal.       Health Maintenance   Topic Date Due    TETANUS VACCINE  Never done    Mammogram  05/03/2022    DEXA SCAN  08/12/2022    Lipid Panel  09/09/2022    Aspirin/Antiplatelet Therapy  01/13/2023    Hepatitis C Screening  Completed

## 2022-01-13 ENCOUNTER — TELEPHONE (OUTPATIENT)
Dept: FAMILY MEDICINE | Facility: CLINIC | Age: 72
End: 2022-01-13
Payer: MEDICARE

## 2022-01-13 DIAGNOSIS — R42 VERTIGO: Primary | ICD-10-CM

## 2022-01-13 DIAGNOSIS — R11.0 NAUSEA: ICD-10-CM

## 2022-01-13 RX ORDER — ONDANSETRON 4 MG/1
4 TABLET, FILM COATED ORAL EVERY 8 HOURS PRN
Qty: 20 TABLET | Refills: 0 | Status: SHIPPED | OUTPATIENT
Start: 2022-01-13 | End: 2022-09-07

## 2022-01-13 NOTE — TELEPHONE ENCOUNTER
Please notify patient that I have placed a referral to ENT for vertigo. Please assist with scheduling. I have also ordered Zofran for nausea/vomiting.

## 2022-01-13 NOTE — TELEPHONE ENCOUNTER
----- Message from Jessica Cabrera sent at 1/13/2022  3:25 PM CST -----  Pt is calling in regards to being sick on last night. Pt stated that she was throwing up. Pt would like to know if this is a side affect of having vertigo. Pt also stated that she only got  dizzy twice today. Please call [phone] (home)

## 2022-01-14 PROBLEM — R42 VERTIGO: Status: ACTIVE | Noted: 2022-01-14

## 2022-01-26 DIAGNOSIS — E03.9 ACQUIRED HYPOTHYROIDISM: ICD-10-CM

## 2022-01-26 NOTE — TELEPHONE ENCOUNTER
No new care gaps identified.  Powered by [employer]. Reference number: [id number].   1/26/2022 7:00:49 AM CST

## 2022-02-10 RX ORDER — LEVOTHYROXINE SODIUM 25 UG/1
TABLET ORAL
Qty: 90 TABLET | Refills: 2 | Status: SHIPPED | OUTPATIENT
Start: 2022-02-10 | End: 2022-09-18 | Stop reason: SDUPTHER

## 2022-02-10 NOTE — TELEPHONE ENCOUNTER
Refill Authorization Note   Flores Adamson  is requesting a refill authorization.  Brief Assessment and Rationale for Refill:  Approve     Medication Therapy Plan:  approve     Medication Reconciliation Completed: No   Comments:   --->Care Gap information included below if applicable.       Requested Prescriptions   Pending Prescriptions Disp Refills    levothyroxine (SYNTHROID) 25 MCG tablet [Pharmacy Med Name: LEVOTHYROXINE SODIUM 25 MCG Tablet] 90 tablet 2     Sig: TAKE 1 TABLET EVERY DAY. REPEAT TSH IN 2 MONTHS       Endocrinology:  Hypothyroid Agents Passed - 2/10/2022  8:06 AM        Passed - Patient is at least 18 years old        Passed - Valid encounter within last 15 months     Recent Visits  Date Type Provider Dept   10/06/21 Office Visit Agustín Carter MD [facility]   09/30/20 Office Visit Agustín Carter MD [facility]   Showing recent visits within past 720 days and meeting all other requirements  Future Appointments  No visits were found meeting these conditions.  Showing future appointments within next 150 days and meeting all other requirements      Future Appointments              In 1 week Torito Rodriguez PA-C [facility] - ENT, [place]    In 3 months STR[facility] ECHO, [facility] - Cardiology, [place]    In 3 months Reji Hoang MD [place] - Cardiology, [place]    In 7 months Agustín Carter MD [place] - [facility]                Passed - Manual Review: FT4 is not required if last TSH is WNL.        Passed - TSH in normal range and within 360 days     TSH   Date Value Ref Range Status   09/09/2021 2.579 0.400 - 4.000 uIU/mL Final   07/19/2021 1.725 0.400 - 4.000 uIU/mL Final   01/14/2021 3.147 0.400 - 4.000 uIU/mL Final              Passed - T4 free within 1080 days     Free T4   Date Value Ref Range Status   10/05/2020 0.87 0.71 - 1.51 ng/dL Final   03/28/2017 0.78 0.71 - 1.51 ng/dL Final   04/15/2016 0.82 0.71 - 1.51 ng/dL  Final                  Appointments  past 12m or future 3m with PCP    Date Provider   Last Visit   10/6/2021 Agustín Carter MD   Next Visit   Visit date not found Agustín Carter MD   ED visits in past 90 days: 0     Note composed:8:07 AM 02/10/2022

## 2022-02-17 NOTE — PROGRESS NOTES
[facility]    Subjective:      Patient: Flores Adamson Patient PCP: Agustín Carter MD         :  1950     Sex:  female      MRN:  [id number]          Date of Visit: 2022      Chief Complaint: Vertigo    Patient ID: Flores Adamson is a 71 y.o. female who was referred to me by Violeta Nicole in consultation for vertigo. Pt seen by IM 2022. Pt states at visit that she had dizziness x 3 days that was provoked with change in position and rapid head movement. Pt also endorsed aural fullness and congestion. Pt had noted bilateral middle ear effusion upon physical examination. Pt started on meclizine 25mg PRN. Pt started on flonase nasal spray daily. Pt had unremarkable CBC. Meclizine has not helped. Pt states that dizziness occurs with laying down and head movement. L>R with occasional true vertigo. Lasts for less than a minute. Pt denies fluctuations in hearing, subjective hearing loss, aural fullness, or tinnitus.There is not a prior history of ear surgery. There is not a prior history of ear infections.    Review of Systems   Constitutional: Negative for chills and fever.   HENT: Negative for ear discharge, ear pain, hearing loss and tinnitus.    Neurological: Positive for dizziness. Negative for facial asymmetry.   Psychiatric/Behavioral: Negative for agitation, behavioral problems, confusion, decreased concentration and dysphoric mood.      Past Medical History  She has a past medical history of Acquired hypothyroidism, Angina pectoris, Anticoagulant long-term use, CAD (coronary artery disease), and GERD (gastroesophageal reflux disease).    Family History  Her family history includes Heart disease in her father.    Past Surgical History:   Procedure Laterality Date     2 stents       1 stent      CHOLECYSTECTOMY      Dr. Roach    ESOPHAGOGASTRODUODENOSCOPY N/A 2018    Procedure: EGD (ESOPHAGOGASTRODUODENOSCOPY);  Surgeon: Marvin Alvarado MD;  Location: [facility]  ENDO;  Service: Endoscopy;  Laterality: N/A;    WRIST FRACTURE SURGERY  03/2018     Social History     Tobacco Use    Smoking status: Never Smoker    Smokeless tobacco: Never Used   Substance and Sexual Activity    Alcohol use: No    Drug use: No    Sexual activity: Not on file     Medications  She has a current medication list which includes the following prescription(s): ascorbic acid (vitamin c), aspirin, atorvastatin, calcium-vitamin d3, diclofenac, famotidine, fluticasone propionate, gabapentin, levothyroxine, meclizine, montelukast, multivitamin, nitroglycerin, ondansetron, pantoprazole, and prednisone.    Review of patient's allergies indicates:   Allergen Reactions    Sulfa (sulfonamide antibiotics) Shortness Of Breath     All medications, allergies, and past history have been reviewed.    Objective:      Vitals:  Last 3 sets of Vitals    Vitals - 1 value per visit 1/11/2022 2/18/2022 2/18/2022   SYSTOLIC 128 - -   DIASTOLIC 70 - -   Pulse 80 - -   Temp 97.5 - -   Resp - - -   SPO2 - - -   Weight (lb) 182.7 - 184.08   Weight (kg) 82.872 - 83.5   Height 62 - 62   BMI (Calculated) 33.4 - 33.7   VISIT REPORT - - -   Pain Score  - 0 -   Some recent data might be hidden       There is no height or weight on file to calculate BSA.    Physical Exam  Constitutional:       General: She is not in acute distress.     Appearance: Normal appearance. She is not ill-appearing.   HENT:      Head: Normocephalic and atraumatic.      Right Ear: Ear canal and external ear normal. There is impacted cerumen.      Left Ear: Tympanic membrane, ear canal and external ear normal.      Nose: Nose normal.      Mouth/Throat:      Lips: Pink. No lesions.      Mouth: Mucous membranes are moist. No oral lesions.      Dentition: No gum lesions.      Tongue: No lesions.      Palate: No lesions.      Pharynx: Oropharynx is clear. Uvula midline. No pharyngeal swelling, oropharyngeal exudate, posterior oropharyngeal erythema or uvula  swelling.   Eyes:      General:         Right eye: No discharge.         Left eye: No discharge.      Extraocular Movements: Extraocular movements intact.      Conjunctiva/sclera: Conjunctivae normal.   Pulmonary:      Effort: Pulmonary effort is normal.   Neurological:      General: No focal deficit present.      Mental Status: She is alert and oriented to person, place, and time. Mental status is at baseline.   Psychiatric:         Mood and Affect: Mood normal.         Behavior: Behavior normal.         Thought Content: Thought content normal.         Judgment: Judgment normal.       Michelle Hallpike: Positive Left; rotary nystagmus toward left ear in head left position during michelle-hallpike with nystagmus noted. Negative right.     Patient: Flores Adamson [id number], :1950  Procedure date:2022  Patient's medications, allergies, past medical, surgical, social and family histories were reviewed and updated as appropriate.  Chief Complaint:  Dizziness    HPI:  Flores is a 71 y.o. female with benign paroxysmal positional vertigo (BPPV) refractory to medical therapy. Hallpike-Michelle maneuver demonstrates nystagmus of delayed onset that fatigues, rotary, clockwise, associated with vertigo.    Procedure: Risks, benefits, and alternatives of the procedure were discussed with the patient, and the patient consented to the Epley maneuver or canalith repositioning procedure (CRP).      With the patient sitting upright on the examination table, the head was lowered to the supine position and the neck rotated 45 degrees to the left side; once nystagmus fatigued, the body and head were slowly rotated to the opposite side 90 degrees, and then after 20-30 seconds the rotation continued another 90 degrees (they rolled onto their shoulder and were looking downwards at a 45 degree angle). After the nystagmus resolved, the patient was gently allowed to sit up with neck flexion.    There were no complications and the patient  tolerated it well.  Post-procedure instructions were given.    Torito Rodriguez performed the entire procedure.      Labs:  WBC   Date Value Ref Range Status   01/11/2022 9.49 3.90 - 12.70 K/uL Final     Platelets   Date Value Ref Range Status   01/11/2022 204 150 - 450 K/uL Final     Creatinine   Date Value Ref Range Status   12/07/2021 0.8 0.5 - 1.4 mg/dL Final     TSH   Date Value Ref Range Status   09/09/2021 2.579 0.400 - 4.000 uIU/mL Final     Glucose   Date Value Ref Range Status   12/07/2021 101 70 - 110 mg/dL Final     Hemoglobin A1C   Date Value Ref Range Status   09/09/2021 5.3 4.0 - 5.6 % Final     Comment:     ADA Screening Guidelines:  5.7-6.4%  Consistent with prediabetes  >or=6.5%  Consistent with diabetes    High levels of fetal hemoglobin interfere with the HbA1C  assay. Heterozygous hemoglobin variants (HbS, HgC, etc)do  not significantly interfere with this assay.   However, presence of multiple variants may affect accuracy.       All lab results and imaging results have been reviewed.    Assessment:        ICD-10-CM ICD-9-CM   1. BPPV (benign paroxysmal positional vertigo), left  H81.12 386.11   2. Vertigo  R42 780.4            Plan:      BPPV (benign paroxysmal positional vertigo), left  -Michelle Hallpike positive Left; rotary nystagmus toward left ear in head left position during michelle-hallpike with nystagmus noted. Negative right. Left epley maneuver performed in office. Pt is to follow post-epley instructions provided via AVS. Pt is to follow up in office in 1 week for BPPV follow up.

## 2022-02-18 ENCOUNTER — OFFICE VISIT (OUTPATIENT)
Dept: OTOLARYNGOLOGY | Facility: CLINIC | Age: 72
End: 2022-02-18
Payer: MEDICARE

## 2022-02-18 ENCOUNTER — PATIENT OUTREACH (OUTPATIENT)
Dept: ADMINISTRATIVE | Facility: OTHER | Age: 72
End: 2022-02-18
Payer: MEDICARE

## 2022-02-18 VITALS — HEIGHT: 62 IN | BODY MASS INDEX: 33.87 KG/M2 | WEIGHT: 184.06 LBS

## 2022-02-18 DIAGNOSIS — R42 VERTIGO: ICD-10-CM

## 2022-02-18 DIAGNOSIS — H81.12 BPPV (BENIGN PAROXYSMAL POSITIONAL VERTIGO), LEFT: Primary | ICD-10-CM

## 2022-02-18 PROCEDURE — 3008F PR BODY MASS INDEX (BMI) DOCUMENTED: ICD-10-PCS | Mod: HCNC,CPTII,S$GLB, | Performed by: PHYSICIAN ASSISTANT

## 2022-02-18 PROCEDURE — 3008F BODY MASS INDEX DOCD: CPT | Mod: HCNC,CPTII,S$GLB, | Performed by: PHYSICIAN ASSISTANT

## 2022-02-18 PROCEDURE — 1159F PR MEDICATION LIST DOCUMENTED IN MEDICAL RECORD: ICD-10-PCS | Mod: HCNC,CPTII,S$GLB, | Performed by: PHYSICIAN ASSISTANT

## 2022-02-18 PROCEDURE — 99203 PR OFFICE/OUTPT VISIT, NEW, LEVL III, 30-44 MIN: ICD-10-PCS | Mod: HCNC,S$GLB,, | Performed by: PHYSICIAN ASSISTANT

## 2022-02-18 PROCEDURE — 99999 PR PBB SHADOW E&M-EST. PATIENT-LVL IV: CPT | Mod: PBBFAC,HCNC,, | Performed by: PHYSICIAN ASSISTANT

## 2022-02-18 PROCEDURE — 99999 PR PBB SHADOW E&M-EST. PATIENT-LVL IV: ICD-10-PCS | Mod: PBBFAC,HCNC,, | Performed by: PHYSICIAN ASSISTANT

## 2022-02-18 PROCEDURE — 1160F RVW MEDS BY RX/DR IN RCRD: CPT | Mod: HCNC,CPTII,S$GLB, | Performed by: PHYSICIAN ASSISTANT

## 2022-02-18 PROCEDURE — 1159F MED LIST DOCD IN RCRD: CPT | Mod: HCNC,CPTII,S$GLB, | Performed by: PHYSICIAN ASSISTANT

## 2022-02-18 PROCEDURE — 1160F PR REVIEW ALL MEDS BY PRESCRIBER/CLIN PHARMACIST DOCUMENTED: ICD-10-PCS | Mod: HCNC,CPTII,S$GLB, | Performed by: PHYSICIAN ASSISTANT

## 2022-02-18 PROCEDURE — 1126F PR PAIN SEVERITY QUANTIFIED, NO PAIN PRESENT: ICD-10-PCS | Mod: HCNC,CPTII,S$GLB, | Performed by: PHYSICIAN ASSISTANT

## 2022-02-18 PROCEDURE — 1126F AMNT PAIN NOTED NONE PRSNT: CPT | Mod: HCNC,CPTII,S$GLB, | Performed by: PHYSICIAN ASSISTANT

## 2022-02-18 PROCEDURE — 99203 OFFICE O/P NEW LOW 30 MIN: CPT | Mod: HCNC,S$GLB,, | Performed by: PHYSICIAN ASSISTANT

## 2022-02-18 NOTE — PROGRESS NOTES
Health Maintenance Due   Topic Date Due    TETANUS VACCINE  Never done    Shingles Vaccine (1 of 2) Never done     Updates were requested from care everywhere.  Chart was reviewed for overdue Proactive [facility] ([facility]OLTAN) topics (CRS, Breast Cancer Screening, Eye exam)  Health Maintenance has been updated.  LINKS immunization registry triggered.  Immunizations were reconciled.

## 2022-02-18 NOTE — PATIENT INSTRUCTIONS
"Patient Instruction After Office Treatments (Epley or Semont maneuvers)    1. After the maneuver is performed wait 10 minutes before going home to avoid quick spins. It takes time for the debris to settle in the correct location (think of a snow globe). Avoid driving yourself home.        2. Sleep semi-recumbent for the next 24-48 hours.  · Sleep at a 45 degree angle (eg. Chair).  · Stay vertical during the day.  · Do not got to the dentist or hairdresser.       · No exercise for a week.    3. For 24-48 hours, avoid up and down head movements (this includes nodding) and avoid bending over or bending down. If you drop something and are unable to squat down to reach it, then have someone pick it up for you or leave it there. You may turn your head left and right slowly, but avoid sudden head movements left or right for 24-48 hours.  · Use a couple of pillows when you sleep for the next 24-48 hours.  · Avoid sleeping on the "bad" side.    4. Wait at least 2 days before doing the Fernandez-Daroff exercise or home epley maneuver unless otherwise instructed by your physician. If you are symptomatic, you may do the exercise 3 times to try to alleviate symptoms. If you are corrected today in office and are no longer having vertigo with head movements, then do the exercise once 2 times daily for 1 week prophylactically. Complete the exercises 3 times before bed that way if you are dizzy symptoms can resolve while sleeping. Repeat every night for a week.    5. At one week post-treatment, put yourself in the position that usually makes you dizzy under conditions in which you can't fall or hurt yourself. Let your doctor know how you did.      Helpful Websites and Links:    Home Epley Maneuver for BPPV (only do side of affected ear)    Video Link:  [email or web address]  ([facility])              "

## 2022-02-24 ENCOUNTER — PATIENT MESSAGE (OUTPATIENT)
Dept: ENDOSCOPY | Facility: HOSPITAL | Age: 72
End: 2022-02-24
Payer: MEDICARE

## 2022-02-25 ENCOUNTER — OFFICE VISIT (OUTPATIENT)
Dept: OTOLARYNGOLOGY | Facility: CLINIC | Age: 72
End: 2022-02-25
Payer: MEDICARE

## 2022-02-25 VITALS — WEIGHT: 184.5 LBS | HEIGHT: 62 IN | BODY MASS INDEX: 33.95 KG/M2

## 2022-02-25 DIAGNOSIS — H81.12 BENIGN PAROXYSMAL POSITIONAL VERTIGO OF LEFT EAR: Primary | ICD-10-CM

## 2022-02-25 PROCEDURE — 1126F AMNT PAIN NOTED NONE PRSNT: CPT | Mod: HCNC,CPTII,S$GLB, | Performed by: PHYSICIAN ASSISTANT

## 2022-02-25 PROCEDURE — 3008F BODY MASS INDEX DOCD: CPT | Mod: HCNC,CPTII,S$GLB, | Performed by: PHYSICIAN ASSISTANT

## 2022-02-25 PROCEDURE — 1160F RVW MEDS BY RX/DR IN RCRD: CPT | Mod: HCNC,CPTII,S$GLB, | Performed by: PHYSICIAN ASSISTANT

## 2022-02-25 PROCEDURE — 1159F MED LIST DOCD IN RCRD: CPT | Mod: HCNC,CPTII,S$GLB, | Performed by: PHYSICIAN ASSISTANT

## 2022-02-25 PROCEDURE — 1126F PR PAIN SEVERITY QUANTIFIED, NO PAIN PRESENT: ICD-10-PCS | Mod: HCNC,CPTII,S$GLB, | Performed by: PHYSICIAN ASSISTANT

## 2022-02-25 PROCEDURE — 99999 PR PBB SHADOW E&M-EST. PATIENT-LVL III: CPT | Mod: PBBFAC,HCNC,, | Performed by: PHYSICIAN ASSISTANT

## 2022-02-25 PROCEDURE — 99213 OFFICE O/P EST LOW 20 MIN: CPT | Mod: HCNC,S$GLB,, | Performed by: PHYSICIAN ASSISTANT

## 2022-02-25 PROCEDURE — 99999 PR PBB SHADOW E&M-EST. PATIENT-LVL III: ICD-10-PCS | Mod: PBBFAC,HCNC,, | Performed by: PHYSICIAN ASSISTANT

## 2022-02-25 PROCEDURE — 1159F PR MEDICATION LIST DOCUMENTED IN MEDICAL RECORD: ICD-10-PCS | Mod: HCNC,CPTII,S$GLB, | Performed by: PHYSICIAN ASSISTANT

## 2022-02-25 PROCEDURE — 1160F PR REVIEW ALL MEDS BY PRESCRIBER/CLIN PHARMACIST DOCUMENTED: ICD-10-PCS | Mod: HCNC,CPTII,S$GLB, | Performed by: PHYSICIAN ASSISTANT

## 2022-02-25 PROCEDURE — 99213 PR OFFICE/OUTPT VISIT, EST, LEVL III, 20-29 MIN: ICD-10-PCS | Mod: HCNC,S$GLB,, | Performed by: PHYSICIAN ASSISTANT

## 2022-02-25 PROCEDURE — 3008F PR BODY MASS INDEX (BMI) DOCUMENTED: ICD-10-PCS | Mod: HCNC,CPTII,S$GLB, | Performed by: PHYSICIAN ASSISTANT

## 2022-02-25 NOTE — PROGRESS NOTES
[facility]    Subjective:      Patient: Flores Adamson Patient PCP: Agustín Carter MD         :  1950     Sex:  female      MRN:  [id number]          Date of Visit: 2022      Chief Complaint: Vertigo    Patient ID:2022 Flores Adamson is a 71 y.o. female who was referred to me by Violeta Nicole in consultation for vertigo. Pt seen by IM 2022. Pt states at visit that she had dizziness x 3 days that was provoked with change in position and rapid head movement. Pt also endorsed aural fullness and congestion. Pt had noted bilateral middle ear effusion upon physical examination. Pt started on meclizine 25mg PRN. Pt started on flonase nasal spray daily. Pt had unremarkable CBC. Meclizine has not helped. Pt states that dizziness occurs with laying down and head movement. L>R with occasional true vertigo. Lasts for less than a minute. Pt denies fluctuations in hearing, subjective hearing loss, aural fullness, or tinnitus.There is not a prior history of ear surgery. There is not a prior history of ear infections.    Interval History 2022: Pt seen at last office visit and had left epley maneuver performed for left posterior canal BPPV. Pt had some mild dizziness the day after left epley was performed, but otherwise her dizziness/vertigo has resolved. Pt has been following at home epley exercises provided to her at her last visit.     Review of Systems   Constitutional: Negative for chills and fever.   Neurological: Negative for dizziness.      Past Medical History  She has a past medical history of Acquired hypothyroidism, Angina pectoris, Anticoagulant long-term use, CAD (coronary artery disease), and GERD (gastroesophageal reflux disease).    Family History  Her family history includes Heart disease in her father.    Past Surgical History:   Procedure Laterality Date     2 stents       1 stent      CHOLECYSTECTOMY      Dr. Roach    ESOPHAGOGASTRODUODENOSCOPY N/A  11/28/2018    Procedure: EGD (ESOPHAGOGASTRODUODENOSCOPY);  Surgeon: Marvin Alvarado MD;  Location: [facility];  Service: Endoscopy;  Laterality: N/A;    WRIST FRACTURE SURGERY  03/2018     Social History     Tobacco Use    Smoking status: Never Smoker    Smokeless tobacco: Never Used   Substance and Sexual Activity    Alcohol use: No    Drug use: No    Sexual activity: Not on file     Medications  She has a current medication list which includes the following prescription(s): ascorbic acid (vitamin c), aspirin, atorvastatin, calcium-vitamin d3, diclofenac, famotidine, fluticasone propionate, gabapentin, levothyroxine, meclizine, montelukast, multivitamin, nitroglycerin, ondansetron, pantoprazole, and prednisone.    Review of patient's allergies indicates:   Allergen Reactions    Sulfa (sulfonamide antibiotics) Shortness Of Breath     All medications, allergies, and past history have been reviewed.    Objective:      Vitals:  Last 3 sets of Vitals    Vitals - 1 value per visit 2/18/2022 2/25/2022 2/25/2022   SYSTOLIC - - -   DIASTOLIC - - -   Pulse - - -   Temp - - -   Resp - - -   SPO2 - - -   Weight (lb) 184.08 - 184.53   Weight (kg) 83.5 - 83.7   Height 62 - 62   BMI (Calculated) 33.7 - 33.7   VISIT REPORT - - -   Pain Score  - 0 -   Some recent data might be hidden       Body surface area is 1.91 meters squared.    Physical Exam  Constitutional:       General: She is not in acute distress.     Appearance: Normal appearance. She is not ill-appearing.   HENT:      Head: Normocephalic and atraumatic.      Right Ear: Tympanic membrane, ear canal and external ear normal.      Left Ear: Tympanic membrane, ear canal and external ear normal.      Nose: Nose normal.      Mouth/Throat:      Lips: Pink. No lesions.      Tongue: No lesions.      Palate: No lesions.      Pharynx: Oropharynx is clear. Uvula midline. No pharyngeal swelling, oropharyngeal exudate, posterior oropharyngeal erythema or uvula swelling.    Eyes:      General:         Right eye: No discharge.         Left eye: No discharge.      Extraocular Movements: Extraocular movements intact.      Conjunctiva/sclera: Conjunctivae normal.   Pulmonary:      Effort: Pulmonary effort is normal.   Neurological:      General: No focal deficit present.      Mental Status: She is alert and oriented to person, place, and time. Mental status is at baseline.   Psychiatric:         Mood and Affect: Mood normal.         Behavior: Behavior normal.         Thought Content: Thought content normal.         Judgment: Judgment normal.     Michelle-Hallpike: Negative bilaterally.    Labs:  WBC   Date Value Ref Range Status   01/11/2022 9.49 3.90 - 12.70 K/uL Final     Platelets   Date Value Ref Range Status   01/11/2022 204 150 - 450 K/uL Final     Creatinine   Date Value Ref Range Status   12/07/2021 0.8 0.5 - 1.4 mg/dL Final     TSH   Date Value Ref Range Status   09/09/2021 2.579 0.400 - 4.000 uIU/mL Final     Glucose   Date Value Ref Range Status   12/07/2021 101 70 - 110 mg/dL Final     Hemoglobin A1C   Date Value Ref Range Status   09/09/2021 5.3 4.0 - 5.6 % Final     Comment:     ADA Screening Guidelines:  5.7-6.4%  Consistent with prediabetes  >or=6.5%  Consistent with diabetes    High levels of fetal hemoglobin interfere with the HbA1C  assay. Heterozygous hemoglobin variants (HbS, HgC, etc)do  not significantly interfere with this assay.   However, presence of multiple variants may affect accuracy.       All lab results and imaging results have been reviewed.    Assessment:        ICD-10-CM ICD-9-CM   1. Benign paroxysmal positional vertigo of left ear-resolved H81.12 386.11            Plan:      BPPV (benign paroxysmal positional vertigo), left-resolved  -[place] Hallpike negative today in office. Advised pt that should she get BPPV again, she is to try at home epley exercises and that if she is unable to correct with at home exercises, then she is to follow up with me in office  to correct her BPPV. Pt is to follow up with ENT PRN for ENT issues.

## 2022-03-08 ENCOUNTER — LAB VISIT (OUTPATIENT)
Dept: LAB | Facility: HOSPITAL | Age: 72
End: 2022-03-08
Attending: INTERNAL MEDICINE
Payer: MEDICARE

## 2022-03-08 DIAGNOSIS — M35.3 POLYMYALGIA RHEUMATICA: Primary | ICD-10-CM

## 2022-03-08 DIAGNOSIS — Z79.52 LONG TERM CURRENT USE OF SYSTEMIC STEROIDS: ICD-10-CM

## 2022-03-08 LAB
BASOPHILS # BLD AUTO: 0.06 K/UL (ref 0–0.2)
BASOPHILS NFR BLD: 0.6 % (ref 0–1.9)
DIFFERENTIAL METHOD: ABNORMAL
EOSINOPHIL # BLD AUTO: 0.2 K/UL (ref 0–0.5)
EOSINOPHIL NFR BLD: 1.9 % (ref 0–8)
ERYTHROCYTE [DISTWIDTH] IN BLOOD BY AUTOMATED COUNT: 12.1 % (ref 11.5–14.5)
HCT VFR BLD AUTO: 40.3 % (ref 37–48.5)
HGB BLD-MCNC: 13 G/DL (ref 12–16)
IMM GRANULOCYTES # BLD AUTO: 0.03 K/UL (ref 0–0.04)
IMM GRANULOCYTES NFR BLD AUTO: 0.3 % (ref 0–0.5)
LYMPHOCYTES # BLD AUTO: 1.5 K/UL (ref 1–4.8)
LYMPHOCYTES NFR BLD: 15.1 % (ref 18–48)
MCH RBC QN AUTO: 31.3 PG (ref 27–31)
MCHC RBC AUTO-ENTMCNC: 32.3 G/DL (ref 32–36)
MCV RBC AUTO: 97 FL (ref 82–98)
MONOCYTES # BLD AUTO: 0.7 K/UL (ref 0.3–1)
MONOCYTES NFR BLD: 6.7 % (ref 4–15)
NEUTROPHILS # BLD AUTO: 7.3 K/UL (ref 1.8–7.7)
NEUTROPHILS NFR BLD: 75.4 % (ref 38–73)
NRBC BLD-RTO: 0 /100 WBC
PLATELET # BLD AUTO: 191 K/UL (ref 150–450)
PMV BLD AUTO: 12.6 FL (ref 9.2–12.9)
RBC # BLD AUTO: 4.15 M/UL (ref 4–5.4)
WBC # BLD AUTO: 9.67 K/UL (ref 3.9–12.7)

## 2022-03-08 PROCEDURE — 36415 COLL VENOUS BLD VENIPUNCTURE: CPT | Mod: HCNC,PO | Performed by: INTERNAL MEDICINE

## 2022-03-08 PROCEDURE — 86140 C-REACTIVE PROTEIN: CPT | Mod: HCNC | Performed by: INTERNAL MEDICINE

## 2022-03-08 PROCEDURE — 85025 COMPLETE CBC W/AUTO DIFF WBC: CPT | Mod: HCNC | Performed by: INTERNAL MEDICINE

## 2022-03-08 PROCEDURE — 80053 COMPREHEN METABOLIC PANEL: CPT | Mod: HCNC | Performed by: INTERNAL MEDICINE

## 2022-03-09 LAB
ALBUMIN SERPL BCP-MCNC: 3.8 G/DL (ref 3.5–5.2)
ALP SERPL-CCNC: 87 U/L (ref 55–135)
ALT SERPL W/O P-5'-P-CCNC: 15 U/L (ref 10–44)
ANION GAP SERPL CALC-SCNC: 9 MMOL/L (ref 8–16)
AST SERPL-CCNC: 18 U/L (ref 10–40)
BILIRUB SERPL-MCNC: 0.6 MG/DL (ref 0.1–1)
BUN SERPL-MCNC: 14 MG/DL (ref 8–23)
CALCIUM SERPL-MCNC: 9.6 MG/DL (ref 8.7–10.5)
CHLORIDE SERPL-SCNC: 103 MMOL/L (ref 95–110)
CO2 SERPL-SCNC: 28 MMOL/L (ref 23–29)
CREAT SERPL-MCNC: 0.9 MG/DL (ref 0.5–1.4)
CRP SERPL-MCNC: 2.7 MG/L (ref 0–8.2)
EST. GFR  (AFRICAN AMERICAN): >60 ML/MIN/1.73 M^2
EST. GFR  (NON AFRICAN AMERICAN): >60 ML/MIN/1.73 M^2
GLUCOSE SERPL-MCNC: 80 MG/DL (ref 70–110)
POTASSIUM SERPL-SCNC: 4.1 MMOL/L (ref 3.5–5.1)
PROT SERPL-MCNC: 7.2 G/DL (ref 6–8.4)
SODIUM SERPL-SCNC: 140 MMOL/L (ref 136–145)

## 2022-04-11 RX ORDER — PANTOPRAZOLE SODIUM 40 MG/1
TABLET, DELAYED RELEASE ORAL
Qty: 180 TABLET | Refills: 0 | OUTPATIENT
Start: 2022-04-11

## 2022-04-11 NOTE — TELEPHONE ENCOUNTER
[facility] Note  Quick DC. Inappropriate Request   Refill request requires further review by MD: NO   Medication Therapy Plan: Pharmacy is requesting new script(s) for the following medications without required information, (sig/ frequency/qty/etc)     [facility] action(s):  Quick Discontinue      Duplicate Pended Encounter(s)/ Last Prescribed Details:    Pharmacies have been requesting medications for patients without required information, (sig, frequency, qty, etc.). In addition, requests are sent for medication(s) pt. are currently not taking, and medications patients have never taken.    We have spoken to the pharmacies about these request types and advised their teams previously that we are unable to assess these New Script requests and require all details for these requests. This is a known issue and has been reported.        Medication related problems are not assessed for QDC.   Medication Reconciliation Completed? NO Were there pending details that required adjustment? NO     Automatic Epic Generated Protocol Data Below:   Requested Prescriptions   Pending Prescriptions Disp Refills    pantoprazole (PROTONIX) 40 MG tablet [Pharmacy Med Name: PANTOPRAZOLE DR 40MG TAB] 180 tablet 0              Appointments      Date Provider   Last Visit   10/6/2021 Agustín Carter MD   Next Visit   10/7/2022 Agustín Carter MD        Note composed:12:25 PM 04/11/2022

## 2022-04-11 NOTE — TELEPHONE ENCOUNTER
No new care gaps identified.  Powered by [employer]. Reference number: [id number].   4/11/2022 11:52:26 AM CDT

## 2022-05-02 RX ORDER — PANTOPRAZOLE SODIUM 40 MG/1
40 TABLET, DELAYED RELEASE ORAL 2 TIMES DAILY
Qty: 180 TABLET | Refills: 0 | Status: SHIPPED | OUTPATIENT
Start: 2022-05-02 | End: 2022-07-15

## 2022-05-02 NOTE — TELEPHONE ENCOUNTER
----- Message from Abby Hernandez sent at 5/2/2022  4:56 PM CDT -----  Pt needs a return call back regarding a prescription she is needing filled. Pt states Kiana has been trying to get the medication, pantoprazole (PROTONIX) 40 MG tablet, filled and have not been successful. Please call back at [phone]

## 2022-05-02 NOTE — TELEPHONE ENCOUNTER
No new care gaps identified.  Powered by [employer]. Reference number: [id number].   5/02/2022 5:00:38 PM CDT

## 2022-05-26 DIAGNOSIS — K21.00 GASTROESOPHAGEAL REFLUX DISEASE WITH ESOPHAGITIS WITHOUT HEMORRHAGE: ICD-10-CM

## 2022-05-26 RX ORDER — FAMOTIDINE 20 MG/1
TABLET, FILM COATED ORAL
Qty: 180 TABLET | Refills: 1 | Status: SHIPPED | OUTPATIENT
Start: 2022-05-26 | End: 2023-01-18

## 2022-05-26 NOTE — TELEPHONE ENCOUNTER
Refill Authorization Note   Flores Adamson  is requesting a refill authorization.  Brief Assessment and Rationale for Refill:  Approve     Medication Therapy Plan:  ACCEPTABLE TO USE H2RA AND PPI TOGETHER PER [facility]    Medication Reconciliation Completed: No   Comments:     No Care Gaps recommended.     Note composed:1:17 PM 05/26/2022

## 2022-05-26 NOTE — TELEPHONE ENCOUNTER
No new care gaps identified.  [facility] Embedded Care Gaps. Reference number: [id number]. 5/26/2022   12:00:46 PM CDT   normal/medium/dry and intact

## 2022-05-31 ENCOUNTER — PATIENT MESSAGE (OUTPATIENT)
Dept: FAMILY MEDICINE | Facility: CLINIC | Age: 72
End: 2022-05-31
Payer: MEDICARE

## 2022-06-03 ENCOUNTER — CLINICAL SUPPORT (OUTPATIENT)
Dept: CARDIOLOGY | Facility: HOSPITAL | Age: 72
End: 2022-06-03
Attending: INTERNAL MEDICINE
Payer: MEDICARE

## 2022-06-03 DIAGNOSIS — E78.5 DYSLIPIDEMIA: ICD-10-CM

## 2022-06-03 DIAGNOSIS — I25.119 CORONARY ARTERY DISEASE INVOLVING NATIVE CORONARY ARTERY OF NATIVE HEART WITH ANGINA PECTORIS: ICD-10-CM

## 2022-06-03 LAB
ASCENDING AORTA: 3.09 CM
CV ECHO LV RWT: 0.47 CM
CV STRESS BASE HR: 67 BPM
DIASTOLIC BLOOD PRESSURE: 76 MMHG
DOP CALC LVOT AREA: 3 CM2
DOP CALC LVOT DIAMETER: 1.95 CM
DOP CALC LVOT PEAK VEL: 0.77 M/S
DOP CALC LVOT STROKE VOLUME: 70.42 CM3
DOP CALCLVOT PEAK VEL VTI: 23.59 CM
E WAVE DECELERATION TIME: 194.56 MSEC
E/A RATIO: 1.18
E/E' RATIO: 12.67 M/S
ECHO LV POSTERIOR WALL: 0.87 CM (ref 0.6–1.1)
EJECTION FRACTION: 55 %
FRACTIONAL SHORTENING: 31 % (ref 28–44)
INTERVENTRICULAR SEPTUM: 0.81 CM (ref 0.6–1.1)
LA MAJOR: 5.28 CM
LA MINOR: 5.19 CM
LA WIDTH: 3.87 CM
LEFT ATRIUM SIZE: 3.85 CM
LEFT ATRIUM VOLUME: 66.29 CM3
LEFT INTERNAL DIMENSION IN SYSTOLE: 2.59 CM (ref 2.1–4)
LEFT VENTRICLE DIASTOLIC VOLUME: 59.34 ML
LEFT VENTRICLE SYSTOLIC VOLUME: 24.42 ML
LEFT VENTRICULAR INTERNAL DIMENSION IN DIASTOLE: 3.73 CM (ref 3.5–6)
LEFT VENTRICULAR MASS: 89.16 G
LV LATERAL E/E' RATIO: 10.36 M/S
LV SEPTAL E/E' RATIO: 16.29 M/S
MV PEAK A VEL: 0.97 M/S
MV PEAK E VEL: 1.14 M/S
OHS CV CPX 1 MINUTE RECOVERY HEART RATE: 121 BPM
OHS CV CPX 85 PERCENT MAX PREDICTED HEART RATE MALE: 120
OHS CV CPX MAX PREDICTED HEART RATE: 142
OHS CV CPX PATIENT IS FEMALE: 1
OHS CV CPX PATIENT IS MALE: 0
OHS CV CPX PEAK DIASTOLIC BLOOD PRESSURE: 95 MMHG
OHS CV CPX PEAK HEAR RATE: 129 BPM
OHS CV CPX PEAK RATE PRESSURE PRODUCT: NORMAL
OHS CV CPX PEAK SYSTOLIC BLOOD PRESSURE: 194 MMHG
OHS CV CPX PERCENT MAX PREDICTED HEART RATE ACHIEVED: 91
OHS CV CPX RATE PRESSURE PRODUCT PRESENTING: NORMAL
PISA TR MAX VEL: 2.27 M/S
RA MAJOR: 4.07 CM
RA WIDTH: 3.23 CM
SINUS: 2.78 CM
STJ: 2.71 CM
SYSTOLIC BLOOD PRESSURE: 167 MMHG
TDI LATERAL: 0.11 M/S
TDI SEPTAL: 0.07 M/S
TDI: 0.09 M/S
TR MAX PG: 21 MMHG

## 2022-06-03 PROCEDURE — 93351 STRESS TTE COMPLETE: CPT | Mod: PO

## 2022-06-03 PROCEDURE — 93351 STRESS TTE COMPLETE: CPT | Mod: 26,,, | Performed by: INTERNAL MEDICINE

## 2022-06-03 PROCEDURE — 93351 STRESS ECHO (CUPID ONLY): ICD-10-PCS | Mod: 26,,, | Performed by: INTERNAL MEDICINE

## 2022-06-08 ENCOUNTER — TELEPHONE (OUTPATIENT)
Dept: CARDIOLOGY | Facility: CLINIC | Age: 72
End: 2022-06-08
Payer: MEDICARE

## 2022-06-08 NOTE — TELEPHONE ENCOUNTER
----- Message from Laney Miller sent at 6/8/2022  2:35 PM CDT -----  Regarding: appointment  Contact: patient  Patient want to know if nurse can just call her and give test results, can't come in to office  have covid, please call back at [phone] (home)

## 2022-06-09 ENCOUNTER — TELEPHONE (OUTPATIENT)
Dept: CARDIOLOGY | Facility: CLINIC | Age: 72
End: 2022-06-09
Payer: MEDICARE

## 2022-06-09 NOTE — TELEPHONE ENCOUNTER
N/A AT THIS, LMOM THAT HER DSE IS WNL       ----- Message from Tiffany Bloom sent at 6/9/2022  1:36 PM CDT -----  Contact: PT  Type: Needs Medical Advice    Who Called: PT  Best Call Back Number: [phone]  Additional  Information: Pt  would like a call back to discuss her test results over the phone. Unable to come in pt  tested positive for Covid  Please Advise- Thank you

## 2022-06-11 ENCOUNTER — TELEPHONE (OUTPATIENT)
Dept: FAMILY MEDICINE | Facility: CLINIC | Age: 72
End: 2022-06-11
Payer: MEDICARE

## 2022-06-11 ENCOUNTER — NURSE TRIAGE (OUTPATIENT)
Dept: ADMINISTRATIVE | Facility: CLINIC | Age: 72
End: 2022-06-11
Payer: MEDICARE

## 2022-06-11 DIAGNOSIS — U07.1 COVID: ICD-10-CM

## 2022-06-11 DIAGNOSIS — U07.1 COVID-19: Primary | ICD-10-CM

## 2022-06-11 NOTE — TELEPHONE ENCOUNTER
Pt reports she was diagnosed with COVID with home test yesterday. Sates symptoms started Wednesday tested that day, ans was negative.. Reports nasal congestion, SOB with activity, fatigue, and cough. Inquiring for EAU infusion as well.    Spoke with Krystle, who requested Triage nurse to send Gigit message to office.        Reason for Disposition   MILD difficulty breathing (e.g., minimal/no SOB at rest, SOB with walking, pulse <100)    Additional Information   Negative: Severe difficulty breathing (e.g., struggling for each breath, speaks in single words)   Negative: Difficult to awaken or acting confused (e.g., disoriented, slurred speech)   Negative: Bluish (or gray) lips or face now   Negative: Shock suspected (e.g., cold/pale/clammy skin, too weak to stand, low BP, rapid pulse)   Negative: Sounds like a life-threatening emergency to the triager   Negative: SEVERE or constant chest pain (Exception: mild central chest pain, present only when coughing)   Negative: MODERATE difficulty breathing (e.g., speaks in phrases, SOB even at rest, pulse 100-120)   Negative: Patient sounds very sick or weak to the triager    Protocols used: CORONAVIRUS (COVID-19) - DIAGNOSED OR [id number]

## 2022-06-11 NOTE — TELEPHONE ENCOUNTER
4 COVID risk score    I have signed for the following orders AND/OR meds.  Please call the patient and ask the patient to schedule the testing AND/OR inform about any medications that were sent.      Orders Placed This Encounter   Procedures    Ambulatory referral/consult to A Infusion     Standing Status:   Future     Standing Expiration Date:   6/17/2022     Referral Priority:   Urgent     Referral Type:   Consultation     Referral Reason:   Specialty Services Required     Number of Visits Requested:   1    COVID-19 Home Symptom Monitoring  - Duration (days): 14     Order Specific Question:   Duration (days)     Answer:   14       Medications Ordered This Encounter   Medications    nirmatrelvir-ritonavir 150 mg x 2- 100 mg copackaged tablets (EUA)     Sig: Take 3 tablets by mouth 2 (two) times daily for 5 days. Each dose contains 2 nirmatrelvir (pink tablets) and 1 ritonavir (white tablet). Take all 3 tablets together     Dispense:  30 tablet     Refill:  0     Order Specific Question:   Per EUA criteria, patient has a positive COVID test AND symptom onset </= 5 days     Answer:   Yes

## 2022-06-11 NOTE — TELEPHONE ENCOUNTER
----- Message from Yadira Modi sent at 6/11/2022  8:33 AM CDT -----  Regarding: Infusion Request  Pt called stating she tested covid positive @ home as of 6/10/22. Pt would like to get the infusions as she is an at risk patient. Please call pt back at [phone].  Please advise.

## 2022-07-19 ENCOUNTER — PES CALL (OUTPATIENT)
Dept: ADMINISTRATIVE | Facility: CLINIC | Age: 72
End: 2022-07-19
Payer: MEDICARE

## 2022-07-22 ENCOUNTER — LAB VISIT (OUTPATIENT)
Dept: LAB | Facility: HOSPITAL | Age: 72
End: 2022-07-22
Attending: INTERNAL MEDICINE
Payer: MEDICARE

## 2022-07-22 DIAGNOSIS — M35.3 POLYMYALGIA RHEUMATICA: Primary | ICD-10-CM

## 2022-07-22 DIAGNOSIS — Z79.52 LONG TERM CURRENT USE OF SYSTEMIC STEROIDS: ICD-10-CM

## 2022-07-22 LAB
ALBUMIN SERPL BCP-MCNC: 3.7 G/DL (ref 3.5–5.2)
ALP SERPL-CCNC: 94 U/L (ref 55–135)
ALT SERPL W/O P-5'-P-CCNC: 19 U/L (ref 10–44)
ANION GAP SERPL CALC-SCNC: 10 MMOL/L (ref 8–16)
AST SERPL-CCNC: 21 U/L (ref 10–40)
BASOPHILS # BLD AUTO: 0.05 K/UL (ref 0–0.2)
BASOPHILS NFR BLD: 0.6 % (ref 0–1.9)
BILIRUB SERPL-MCNC: 0.6 MG/DL (ref 0.1–1)
BUN SERPL-MCNC: 14 MG/DL (ref 8–23)
CALCIUM SERPL-MCNC: 9.2 MG/DL (ref 8.7–10.5)
CHLORIDE SERPL-SCNC: 108 MMOL/L (ref 95–110)
CO2 SERPL-SCNC: 25 MMOL/L (ref 23–29)
CREAT SERPL-MCNC: 0.9 MG/DL (ref 0.5–1.4)
CRP SERPL-MCNC: 2.6 MG/L (ref 0–8.2)
DIFFERENTIAL METHOD: ABNORMAL
EOSINOPHIL # BLD AUTO: 0.2 K/UL (ref 0–0.5)
EOSINOPHIL NFR BLD: 2.5 % (ref 0–8)
ERYTHROCYTE [DISTWIDTH] IN BLOOD BY AUTOMATED COUNT: 12.6 % (ref 11.5–14.5)
EST. GFR  (AFRICAN AMERICAN): >60 ML/MIN/1.73 M^2
EST. GFR  (NON AFRICAN AMERICAN): >60 ML/MIN/1.73 M^2
GLUCOSE SERPL-MCNC: 92 MG/DL (ref 70–110)
HCT VFR BLD AUTO: 37.9 % (ref 37–48.5)
HGB BLD-MCNC: 12 G/DL (ref 12–16)
IMM GRANULOCYTES # BLD AUTO: 0.03 K/UL (ref 0–0.04)
IMM GRANULOCYTES NFR BLD AUTO: 0.4 % (ref 0–0.5)
LYMPHOCYTES # BLD AUTO: 1.1 K/UL (ref 1–4.8)
LYMPHOCYTES NFR BLD: 13.3 % (ref 18–48)
MCH RBC QN AUTO: 30.6 PG (ref 27–31)
MCHC RBC AUTO-ENTMCNC: 31.7 G/DL (ref 32–36)
MCV RBC AUTO: 97 FL (ref 82–98)
MONOCYTES # BLD AUTO: 0.6 K/UL (ref 0.3–1)
MONOCYTES NFR BLD: 7.3 % (ref 4–15)
NEUTROPHILS # BLD AUTO: 6.4 K/UL (ref 1.8–7.7)
NEUTROPHILS NFR BLD: 75.9 % (ref 38–73)
NRBC BLD-RTO: 0 /100 WBC
PLATELET # BLD AUTO: 157 K/UL (ref 150–450)
PMV BLD AUTO: 12.6 FL (ref 9.2–12.9)
POTASSIUM SERPL-SCNC: 4.4 MMOL/L (ref 3.5–5.1)
PROT SERPL-MCNC: 7.2 G/DL (ref 6–8.4)
RBC # BLD AUTO: 3.92 M/UL (ref 4–5.4)
SODIUM SERPL-SCNC: 143 MMOL/L (ref 136–145)
WBC # BLD AUTO: 8.36 K/UL (ref 3.9–12.7)

## 2022-07-22 PROCEDURE — 86140 C-REACTIVE PROTEIN: CPT | Performed by: INTERNAL MEDICINE

## 2022-07-22 PROCEDURE — 80053 COMPREHEN METABOLIC PANEL: CPT | Performed by: INTERNAL MEDICINE

## 2022-07-22 PROCEDURE — 36415 COLL VENOUS BLD VENIPUNCTURE: CPT | Mod: PO | Performed by: INTERNAL MEDICINE

## 2022-07-22 PROCEDURE — 85025 COMPLETE CBC W/AUTO DIFF WBC: CPT | Performed by: INTERNAL MEDICINE

## 2022-07-27 DIAGNOSIS — R42 VERTIGO: ICD-10-CM

## 2022-07-27 RX ORDER — FLUTICASONE PROPIONATE 50 MCG
SPRAY, SUSPENSION (ML) NASAL
Qty: 48 G | Refills: 0 | OUTPATIENT
Start: 2022-07-27

## 2022-07-27 RX ORDER — BENZONATATE 100 MG/1
100 CAPSULE ORAL 3 TIMES DAILY PRN
Qty: 30 CAPSULE | Refills: 0 | Status: SHIPPED | OUTPATIENT
Start: 2022-07-27

## 2022-07-27 NOTE — TELEPHONE ENCOUNTER
No new care gaps identified.  [facility] Embedded Care Gaps. Reference number: [id number]. 7/27/2022   11:06:09 AM MARICRUZ

## 2022-07-27 NOTE — TELEPHONE ENCOUNTER
[facility] Note  Quick DC. Inappropriate Request   Refill request requires further review by MD: NO   Medication Therapy Plan: Pharmacy is requesting new script(s) for the following medications without required information, (sig/ frequency/qty/etc)     [facility] action(s):  Quick Discontinue      Duplicate Pended Encounter(s)/ Last Prescribed Details:    Pharmacies have been requesting medications for patients without required information, (sig, frequency, qty, etc.). In addition, requests are sent for medication(s) pt. are currently not taking, and medications patients have never taken.    We have spoken to the pharmacies about these request types and advised their teams previously that we are unable to assess these New Script requests and require all details for these requests. This is a known issue and has been reported.        Medication related problems are not assessed for QDC.   Medication Reconciliation Completed? NO Were there pending details that required adjustment? NO     Automatic Epic Generated Protocol Data Below:   Requested Prescriptions   Pending Prescriptions Disp Refills    fluticasone propionate (FLONASE) 50 mcg/actuation nasal spray [Pharmacy Med Name: FLUTICASONE 50MCG/ACT NASAL SPR] 48 g 0              Appointments      Date Provider   Last Visit   10/6/2021 Agustín Carter MD   Next Visit   10/7/2022 Agustín Carter MD        Note composed:5:16 PM 07/27/2022

## 2022-08-17 ENCOUNTER — TELEPHONE (OUTPATIENT)
Dept: FAMILY MEDICINE | Facility: CLINIC | Age: 72
End: 2022-08-17
Payer: MEDICARE

## 2022-08-17 NOTE — TELEPHONE ENCOUNTER
----- Message from Sade Woodard sent at 8/17/2022  1:53 PM CDT -----  Regarding: AWV  Contact: Patient  Patient would like a call back concerning scheduling her AWV. Please call patient to set up an appointment at  [phone] (home)

## 2022-08-17 NOTE — TELEPHONE ENCOUNTER
Spoke with patient regarding AWV, she stated that she wanted to r/s the appointment for the nurse to come out to her home and do this visit.  I explained that we don't schedule that she would need to contact her insurance to get this r/s.  Patient verbalized understanding.

## 2022-08-24 ENCOUNTER — PES CALL (OUTPATIENT)
Dept: ADMINISTRATIVE | Facility: CLINIC | Age: 72
End: 2022-08-24
Payer: MEDICARE

## 2022-08-29 ENCOUNTER — PES CALL (OUTPATIENT)
Dept: ADMINISTRATIVE | Facility: CLINIC | Age: 72
End: 2022-08-29
Payer: MEDICARE

## 2022-08-31 RX ORDER — MONTELUKAST SODIUM 10 MG/1
TABLET ORAL
Qty: 90 TABLET | Refills: 0 | Status: SHIPPED | OUTPATIENT
Start: 2022-08-31 | End: 2023-01-25

## 2022-08-31 NOTE — TELEPHONE ENCOUNTER
Refill Decision Note   Flores Adamson  is requesting a refill authorization.  Brief Assessment and Rationale for Refill:  Approve     Medication Therapy Plan:       Medication Reconciliation Completed: No   Comments:     No Care Gaps recommended.     Note composed:1:23 PM 08/31/2022

## 2022-08-31 NOTE — TELEPHONE ENCOUNTER
No new care gaps identified.  [facility] Embedded Care Gaps. Reference number: [id number]. 8/31/2022   7:11:49 AM CASSIET

## 2022-09-05 ENCOUNTER — NURSE TRIAGE (OUTPATIENT)
Dept: ADMINISTRATIVE | Facility: CLINIC | Age: 72
End: 2022-09-05
Payer: MEDICARE

## 2022-09-05 NOTE — TELEPHONE ENCOUNTER
Patient states she started with burning with urination, cloudy urine and pressure in bladder. Denies fever. States able to urinate when goes to bathroom. Advised patient of dispo to see MD with 4 hours. Verbalized understanding. Advised to call back if symptoms become worse or with further questions.       Reason for Disposition   [1] Discomfort (pain, burning or stinging) when passing urine AND [2] female   Side (flank) or lower back pain present    Additional Information   Negative: Shock suspected (e.g., cold/pale/clammy skin, too weak to stand, low BP, rapid pulse)   Negative: Sounds like a life-threatening emergency to the triager   Negative: Shock suspected (e.g., cold/pale/clammy skin, too weak to stand, low BP, rapid pulse)   Negative: Sounds like a life-threatening emergency to the triager   Negative: [1] Unable to urinate (or only a few drops) > 4 hours AND     [2] bladder feels very full (e.g., palpable bladder or strong urge to urinate)   Negative: Patient sounds very sick or weak to the triager   Negative: [1] SEVERE pain with urination  (e.g., excruciating) AND [2] not improved after 2 hours of pain medicine and Sitz bath   Negative: Fever > 100.5 F (38.1 C)    Protocols used: Urinary Symptoms-A-AH, Urination Pain - Female-A-AH

## 2022-09-06 ENCOUNTER — PATIENT MESSAGE (OUTPATIENT)
Dept: FAMILY MEDICINE | Facility: CLINIC | Age: 72
End: 2022-09-06
Payer: MEDICARE

## 2022-09-06 NOTE — TELEPHONE ENCOUNTER
Spoke with patient and she has an appointment with Meseret Perkins for tomorrow afternoon for further evaluation and treatment.

## 2022-09-06 NOTE — TELEPHONE ENCOUNTER
Patient has not logged into [employer] since July.  Please call the patient to set up urinalysis and visit.

## 2022-09-07 ENCOUNTER — HOSPITAL ENCOUNTER (OUTPATIENT)
Dept: RADIOLOGY | Facility: HOSPITAL | Age: 72
Discharge: HOME | End: 2022-09-07
Attending: NURSE PRACTITIONER
Payer: MEDICARE

## 2022-09-07 ENCOUNTER — OFFICE VISIT (OUTPATIENT)
Dept: FAMILY MEDICINE | Facility: CLINIC | Age: 72
End: 2022-09-07
Payer: MEDICARE

## 2022-09-07 VITALS
SYSTOLIC BLOOD PRESSURE: 126 MMHG | BODY MASS INDEX: 34.41 KG/M2 | OXYGEN SATURATION: 98 % | WEIGHT: 187 LBS | DIASTOLIC BLOOD PRESSURE: 60 MMHG | TEMPERATURE: 98 F | HEART RATE: 84 BPM | HEIGHT: 62 IN

## 2022-09-07 DIAGNOSIS — N39.0 URINARY TRACT INFECTION WITHOUT HEMATURIA, SITE UNSPECIFIED: Primary | ICD-10-CM

## 2022-09-07 DIAGNOSIS — S49.91XA INJURY OF RIGHT SHOULDER, INITIAL ENCOUNTER: ICD-10-CM

## 2022-09-07 DIAGNOSIS — R30.0 DYSURIA: ICD-10-CM

## 2022-09-07 LAB
BACTERIA #/AREA URNS HPF: ABNORMAL /HPF
BILIRUB UR QL STRIP: NEGATIVE
CLARITY UR: ABNORMAL
COLOR UR: YELLOW
GLUCOSE UR QL STRIP: NEGATIVE
HGB UR QL STRIP: ABNORMAL
KETONES UR QL STRIP: NEGATIVE
LEUKOCYTE ESTERASE UR QL STRIP: ABNORMAL
MICROSCOPIC COMMENT: ABNORMAL
NITRITE UR QL STRIP: POSITIVE
PH UR STRIP: 6 [PH] (ref 5–8)
PROT UR QL STRIP: NEGATIVE
RBC #/AREA URNS HPF: 10 /HPF (ref 0–4)
SP GR UR STRIP: 1.02 (ref 1–1.03)
URN SPEC COLLECT METH UR: ABNORMAL
WBC #/AREA URNS HPF: >100 /HPF (ref 0–5)

## 2022-09-07 PROCEDURE — 73030 XR SHOULDER COMPLETE 2 OR MORE VIEWS RIGHT: ICD-10-PCS | Mod: 26,RT,, | Performed by: RADIOLOGY

## 2022-09-07 PROCEDURE — 3078F DIAST BP <80 MM HG: CPT | Mod: CPTII,S$GLB,, | Performed by: NURSE PRACTITIONER

## 2022-09-07 PROCEDURE — 81000 URINALYSIS NONAUTO W/SCOPE: CPT | Mod: PO | Performed by: NURSE PRACTITIONER

## 2022-09-07 PROCEDURE — 87077 CULTURE AEROBIC IDENTIFY: CPT | Performed by: NURSE PRACTITIONER

## 2022-09-07 PROCEDURE — 99214 OFFICE O/P EST MOD 30 MIN: CPT | Mod: S$GLB,,, | Performed by: NURSE PRACTITIONER

## 2022-09-07 PROCEDURE — 3288F FALL RISK ASSESSMENT DOCD: CPT | Mod: CPTII,S$GLB,, | Performed by: NURSE PRACTITIONER

## 2022-09-07 PROCEDURE — 3288F PR FALLS RISK ASSESSMENT DOCUMENTED: ICD-10-PCS | Mod: CPTII,S$GLB,, | Performed by: NURSE PRACTITIONER

## 2022-09-07 PROCEDURE — 1126F AMNT PAIN NOTED NONE PRSNT: CPT | Mod: CPTII,S$GLB,, | Performed by: NURSE PRACTITIONER

## 2022-09-07 PROCEDURE — 1101F PR PT FALLS ASSESS DOC 0-1 FALLS W/OUT INJ PAST YR: ICD-10-PCS | Mod: CPTII,S$GLB,, | Performed by: NURSE PRACTITIONER

## 2022-09-07 PROCEDURE — 1101F PT FALLS ASSESS-DOCD LE1/YR: CPT | Mod: CPTII,S$GLB,, | Performed by: NURSE PRACTITIONER

## 2022-09-07 PROCEDURE — 73030 X-RAY EXAM OF SHOULDER: CPT | Mod: 26,RT,, | Performed by: RADIOLOGY

## 2022-09-07 PROCEDURE — 87086 URINE CULTURE/COLONY COUNT: CPT | Performed by: NURSE PRACTITIONER

## 2022-09-07 PROCEDURE — 99214 PR OFFICE/OUTPT VISIT, EST, LEVL IV, 30-39 MIN: ICD-10-PCS | Mod: S$GLB,,, | Performed by: NURSE PRACTITIONER

## 2022-09-07 PROCEDURE — 1159F PR MEDICATION LIST DOCUMENTED IN MEDICAL RECORD: ICD-10-PCS | Mod: CPTII,S$GLB,, | Performed by: NURSE PRACTITIONER

## 2022-09-07 PROCEDURE — 3078F PR MOST RECENT DIASTOLIC BLOOD PRESSURE < 80 MM HG: ICD-10-PCS | Mod: CPTII,S$GLB,, | Performed by: NURSE PRACTITIONER

## 2022-09-07 PROCEDURE — 99999 PR PBB SHADOW E&M-EST. PATIENT-LVL V: ICD-10-PCS | Mod: PBBFAC,,, | Performed by: NURSE PRACTITIONER

## 2022-09-07 PROCEDURE — 1160F PR REVIEW ALL MEDS BY PRESCRIBER/CLIN PHARMACIST DOCUMENTED: ICD-10-PCS | Mod: CPTII,S$GLB,, | Performed by: NURSE PRACTITIONER

## 2022-09-07 PROCEDURE — 3074F SYST BP LT 130 MM HG: CPT | Mod: CPTII,S$GLB,, | Performed by: NURSE PRACTITIONER

## 2022-09-07 PROCEDURE — 3008F PR BODY MASS INDEX (BMI) DOCUMENTED: ICD-10-PCS | Mod: CPTII,S$GLB,, | Performed by: NURSE PRACTITIONER

## 2022-09-07 PROCEDURE — 3074F PR MOST RECENT SYSTOLIC BLOOD PRESSURE < 130 MM HG: ICD-10-PCS | Mod: CPTII,S$GLB,, | Performed by: NURSE PRACTITIONER

## 2022-09-07 PROCEDURE — 1126F PR PAIN SEVERITY QUANTIFIED, NO PAIN PRESENT: ICD-10-PCS | Mod: CPTII,S$GLB,, | Performed by: NURSE PRACTITIONER

## 2022-09-07 PROCEDURE — 87186 SC STD MICRODIL/AGAR DIL: CPT | Performed by: NURSE PRACTITIONER

## 2022-09-07 PROCEDURE — 3008F BODY MASS INDEX DOCD: CPT | Mod: CPTII,S$GLB,, | Performed by: NURSE PRACTITIONER

## 2022-09-07 PROCEDURE — 99999 PR PBB SHADOW E&M-EST. PATIENT-LVL V: CPT | Mod: PBBFAC,,, | Performed by: NURSE PRACTITIONER

## 2022-09-07 PROCEDURE — 1160F RVW MEDS BY RX/DR IN RCRD: CPT | Mod: CPTII,S$GLB,, | Performed by: NURSE PRACTITIONER

## 2022-09-07 PROCEDURE — 87088 URINE BACTERIA CULTURE: CPT | Performed by: NURSE PRACTITIONER

## 2022-09-07 PROCEDURE — 73030 X-RAY EXAM OF SHOULDER: CPT | Mod: TC,PO,RT

## 2022-09-07 PROCEDURE — 1159F MED LIST DOCD IN RCRD: CPT | Mod: CPTII,S$GLB,, | Performed by: NURSE PRACTITIONER

## 2022-09-07 RX ORDER — NITROFURANTOIN 25; 75 MG/1; MG/1
100 CAPSULE ORAL 2 TIMES DAILY
Qty: 14 CAPSULE | Refills: 0 | Status: SHIPPED | OUTPATIENT
Start: 2022-09-07 | End: 2022-09-14

## 2022-09-07 NOTE — PROGRESS NOTES
Subjective:       Patient ID: Flores Adamson is a 71 y.o. female.    Chief Complaint: Dysuria    Dysuria   This is a new problem. The current episode started in the past 7 days. The problem occurs every urination. The problem has been unchanged. The quality of the pain is described as burning. The pain is moderate. There has been no fever. Associated symptoms include frequency. Pertinent negatives include no vomiting or rash. Treatments tried: Azo. The treatment provided mild relief.   Shoulder Injury   The incident occurred at home. The right shoulder is affected. Incident onset: 6 months. Injury mechanism: heard a pop while pulling open a kitchen drawer. The quality of the pain is described as aching. Radiates to: right upper arm to elbow. Associated symptoms include tingling (right hand). Pertinent negatives include no chest pain. The symptoms are aggravated by movement and palpation. She has tried acetaminophen (prednisone) for the symptoms. The treatment provided no relief.     Review of Systems   Constitutional:  Negative for fatigue, fever and unexpected weight change.   HENT:  Negative for ear pain and sore throat.    Eyes:  Negative for pain and visual disturbance.   Respiratory:  Negative for cough and shortness of breath.    Cardiovascular:  Negative for chest pain and palpitations.   Gastrointestinal:  Negative for abdominal pain, diarrhea and vomiting.   Genitourinary:  Positive for dysuria and frequency.   Musculoskeletal:  Positive for arthralgias. Negative for myalgias.   Skin:  Negative for color change and rash.   Neurological:  Positive for tingling (right hand). Negative for dizziness and headaches.   Psychiatric/Behavioral:  Negative for dysphoric mood and sleep disturbance. The patient is not nervous/anxious.      Vitals:    09/07/22 1427   BP: 126/60   Pulse: 84   Temp: 98 °F (36.7 °C)       Objective:     Current Outpatient Medications   Medication Sig Dispense Refill    ascorbic acid,  vitamin C, (VITAMIN C) 500 MG tablet Take 500 mg by mouth once daily.      aspirin (ECOTRIN) 81 MG EC tablet Take 81 mg by mouth once daily.      atorvastatin (LIPITOR) 40 MG tablet TAKE 1 TABLET EVERY DAY 90 tablet 2    benzonatate (TESSALON) 100 MG capsule Take 1 capsule (100 mg total) by mouth 3 (three) times daily as needed for Cough. 30 capsule 0    calcium-vitamin D3 (OS-SHEILA 500 + D3) 500 mg(1,250mg) -200 unit per tablet Take 1 tablet by mouth 2 (two) times daily with meals.      famotidine (PEPCID) 20 MG tablet TAKE 1 TABLET TWICE DAILY AS NEEDED FOR HEARTBURN. 180 tablet 1    fluticasone propionate (FLONASE) 50 mcg/actuation nasal spray SHAKE LIQUID AND USE 1 SPRAY(50 MCG) IN EACH NOSTRIL EVERY DAY 48 g 0    gabapentin (NEURONTIN) 300 MG capsule TAKE 3 CAPSULES EVERY EVENING 90 capsule 12    levothyroxine (SYNTHROID) 25 MCG tablet TAKE 1 TABLET EVERY DAY. REPEAT TSH IN 2 MONTHS 90 tablet 2    montelukast (SINGULAIR) 10 mg tablet TAKE 1 TABLET EVERY DAY 90 tablet 0    multivitamin (THERAGRAN) per tablet Take 1 tablet by mouth once daily.      pantoprazole (PROTONIX) 40 MG tablet TAKE 1 TABLET TWICE DAILY 180 tablet 0    diclofenac (VOLTAREN) 75 MG EC tablet Take 75 mg by mouth once daily.      nitrofurantoin, macrocrystal-monohydrate, (MACROBID) 100 MG capsule Take 1 capsule (100 mg total) by mouth 2 (two) times daily. for 7 days 14 capsule 0    nitroGLYCERIN (NITROSTAT) 0.4 MG SL tablet Place 0.4 mg under the tongue.       No current facility-administered medications for this visit.       Physical Exam  Vitals and nursing note reviewed.   Constitutional:       General: She is not in acute distress.     Appearance: She is well-developed.   HENT:      Head: Normocephalic and atraumatic.   Eyes:      Pupils: Pupils are equal, round, and reactive to light.   Cardiovascular:      Rate and Rhythm: Normal rate and regular rhythm.   Pulmonary:      Effort: Pulmonary effort is normal.      Breath  sounds: Normal breath sounds.   Musculoskeletal:      Right shoulder: Tenderness (anterior aspect) present. Decreased range of motion.      Cervical back: Normal range of motion and neck supple.   Skin:     General: Skin is warm and dry.      Findings: No rash.   Neurological:      Mental Status: She is alert and oriented to person, place, and time.   Psychiatric:         Judgment: Judgment normal.         Lab Results   Component Value Date    COLORU Yellow 09/07/2022    APPEARANCEUA Hazy (A) 09/07/2022    GLUCUA Negative 09/07/2022    SPECGRAV 1.025 09/07/2022    PHUR 6.0 09/07/2022    NITRITE Positive (A) 09/07/2022    KETONESU Negative 09/07/2022    BILIRUBINUA Negative 09/07/2022    OCCULTUA 3+ (A) 09/07/2022    LEUKOCYTESUR 3+ (A) 09/07/2022       Lab Results   Component Value Date    RBCUA 10 (H) 09/07/2022    WBCUA >100 (H) 09/07/2022    BACTERIA Many (A) 09/07/2022    SQUAMEPITHEL 3 04/28/2015    MICROCMT SEE COMMENT 09/07/2022       Assessment:       1. Urinary tract infection without hematuria, site unspecified    2. Dysuria    3. Injury of right shoulder, initial encounter          Plan:   Urinary tract infection without hematuria, site unspecified  -     CULTURE, URINE    Dysuria  -     Urinalysis    Injury of right shoulder, initial encounter  -     X-ray Shoulder 2 or More Views Right; Future; Expected date: 09/07/2022    Other orders  -     Urinalysis Microscopic  -     nitrofurantoin, macrocrystal-monohydrate, (MACROBID) 100 MG capsule; Take 1 capsule (100 mg total) by mouth 2 (two) times daily. for 7 days  Dispense: 14 capsule; Refill: 0      No follow-ups on file.    There are no Patient Instructions on file for this visit.

## 2022-09-10 LAB — BACTERIA UR CULT: ABNORMAL

## 2022-09-13 ENCOUNTER — TELEPHONE (OUTPATIENT)
Dept: FAMILY MEDICINE | Facility: CLINIC | Age: 72
End: 2022-09-13
Payer: MEDICARE

## 2022-09-13 NOTE — TELEPHONE ENCOUNTER
----- Message from Saloni Esteban sent at 9/13/2022 12:34 PM CDT -----  Type:  Test Results    Who Called: pt  Name of Test (Lab/Mammo/Etc): lab and xray results  Date of Test: 9/7  Ordering Provider: Dr Carter   Where the test was performed: [facility]  Would the patient rather a call back or a response via [facility]? call  Best Call Back Number: [phone]   Additional Information:  .    Thank you

## 2022-09-14 DIAGNOSIS — Z78.0 MENOPAUSE: ICD-10-CM

## 2022-09-14 RX ORDER — CIPROFLOXACIN 500 MG/1
500 TABLET ORAL 2 TIMES DAILY
Qty: 10 TABLET | Refills: 0 | Status: SHIPPED | OUTPATIENT
Start: 2022-09-14 | End: 2022-09-19

## 2022-09-29 ENCOUNTER — LAB VISIT (OUTPATIENT)
Dept: LAB | Facility: HOSPITAL | Age: 72
End: 2022-09-29
Attending: FAMILY MEDICINE
Payer: MEDICARE

## 2022-09-29 DIAGNOSIS — Z13.6 ENCOUNTER FOR LIPID SCREENING FOR CARDIOVASCULAR DISEASE: ICD-10-CM

## 2022-09-29 DIAGNOSIS — Z13.220 ENCOUNTER FOR LIPID SCREENING FOR CARDIOVASCULAR DISEASE: ICD-10-CM

## 2022-09-29 DIAGNOSIS — Z79.899 ENCOUNTER FOR LONG-TERM (CURRENT) USE OF MEDICATIONS: ICD-10-CM

## 2022-09-29 DIAGNOSIS — Z00.01 ENCOUNTER FOR GENERAL ADULT MEDICAL EXAMINATION WITH ABNORMAL FINDINGS: ICD-10-CM

## 2022-09-29 LAB
ALBUMIN SERPL BCP-MCNC: 4 G/DL (ref 3.5–5.2)
ALP SERPL-CCNC: 112 U/L (ref 55–135)
ALT SERPL W/O P-5'-P-CCNC: 27 U/L (ref 10–44)
ANION GAP SERPL CALC-SCNC: 11 MMOL/L (ref 8–16)
AST SERPL-CCNC: 30 U/L (ref 10–40)
BASOPHILS # BLD AUTO: 0.03 K/UL (ref 0–0.2)
BASOPHILS NFR BLD: 0.6 % (ref 0–1.9)
BILIRUB SERPL-MCNC: 0.8 MG/DL (ref 0.1–1)
BUN SERPL-MCNC: 11 MG/DL (ref 8–23)
CALCIUM SERPL-MCNC: 9.4 MG/DL (ref 8.7–10.5)
CHLORIDE SERPL-SCNC: 108 MMOL/L (ref 95–110)
CHOLEST SERPL-MCNC: 119 MG/DL (ref 120–199)
CHOLEST/HDLC SERPL: 2.4 {RATIO} (ref 2–5)
CO2 SERPL-SCNC: 23 MMOL/L (ref 23–29)
CREAT SERPL-MCNC: 0.8 MG/DL (ref 0.5–1.4)
DIFFERENTIAL METHOD: ABNORMAL
EOSINOPHIL # BLD AUTO: 0.1 K/UL (ref 0–0.5)
EOSINOPHIL NFR BLD: 3 % (ref 0–8)
ERYTHROCYTE [DISTWIDTH] IN BLOOD BY AUTOMATED COUNT: 12.1 % (ref 11.5–14.5)
EST. GFR  (NO RACE VARIABLE): >60 ML/MIN/1.73 M^2
ESTIMATED AVG GLUCOSE: 108 MG/DL (ref 68–131)
GLUCOSE SERPL-MCNC: 94 MG/DL (ref 70–110)
HBA1C MFR BLD: 5.4 % (ref 4–5.6)
HCT VFR BLD AUTO: 37.3 % (ref 37–48.5)
HDLC SERPL-MCNC: 49 MG/DL (ref 40–75)
HDLC SERPL: 41.2 % (ref 20–50)
HGB BLD-MCNC: 11.9 G/DL (ref 12–16)
IMM GRANULOCYTES # BLD AUTO: 0.01 K/UL (ref 0–0.04)
IMM GRANULOCYTES NFR BLD AUTO: 0.2 % (ref 0–0.5)
LDLC SERPL CALC-MCNC: 51.2 MG/DL (ref 63–159)
LYMPHOCYTES # BLD AUTO: 1.3 K/UL (ref 1–4.8)
LYMPHOCYTES NFR BLD: 27.2 % (ref 18–48)
MCH RBC QN AUTO: 29.8 PG (ref 27–31)
MCHC RBC AUTO-ENTMCNC: 31.9 G/DL (ref 32–36)
MCV RBC AUTO: 94 FL (ref 82–98)
MONOCYTES # BLD AUTO: 0.4 K/UL (ref 0.3–1)
MONOCYTES NFR BLD: 7.6 % (ref 4–15)
NEUTROPHILS # BLD AUTO: 2.9 K/UL (ref 1.8–7.7)
NEUTROPHILS NFR BLD: 61.4 % (ref 38–73)
NONHDLC SERPL-MCNC: 70 MG/DL
NRBC BLD-RTO: 0 /100 WBC
PLATELET # BLD AUTO: 181 K/UL (ref 150–450)
PMV BLD AUTO: 12.9 FL (ref 9.2–12.9)
POTASSIUM SERPL-SCNC: 4.5 MMOL/L (ref 3.5–5.1)
PROT SERPL-MCNC: 6.9 G/DL (ref 6–8.4)
RBC # BLD AUTO: 3.99 M/UL (ref 4–5.4)
SODIUM SERPL-SCNC: 142 MMOL/L (ref 136–145)
TRIGL SERPL-MCNC: 94 MG/DL (ref 30–150)
WBC # BLD AUTO: 4.71 K/UL (ref 3.9–12.7)

## 2022-09-29 PROCEDURE — 36415 COLL VENOUS BLD VENIPUNCTURE: CPT | Mod: PO | Performed by: FAMILY MEDICINE

## 2022-09-29 PROCEDURE — 85025 COMPLETE CBC W/AUTO DIFF WBC: CPT | Performed by: FAMILY MEDICINE

## 2022-09-29 PROCEDURE — 80061 LIPID PANEL: CPT | Performed by: FAMILY MEDICINE

## 2022-09-29 PROCEDURE — 83036 HEMOGLOBIN GLYCOSYLATED A1C: CPT | Performed by: FAMILY MEDICINE

## 2022-09-29 PROCEDURE — 80053 COMPREHEN METABOLIC PANEL: CPT | Performed by: FAMILY MEDICINE

## 2022-10-05 ENCOUNTER — TELEPHONE (OUTPATIENT)
Dept: OTOLARYNGOLOGY | Facility: CLINIC | Age: 72
End: 2022-10-05
Payer: MEDICARE

## 2022-10-05 ENCOUNTER — OFFICE VISIT (OUTPATIENT)
Dept: HOME HEALTH SERVICES | Facility: CLINIC | Age: 72
End: 2022-10-05
Payer: MEDICARE

## 2022-10-05 VITALS
SYSTOLIC BLOOD PRESSURE: 110 MMHG | DIASTOLIC BLOOD PRESSURE: 75 MMHG | TEMPERATURE: 98 F | OXYGEN SATURATION: 98 % | WEIGHT: 182 LBS | HEIGHT: 62 IN | BODY MASS INDEX: 33.49 KG/M2 | HEART RATE: 85 BPM

## 2022-10-05 DIAGNOSIS — M35.3 POLYMYALGIA RHEUMATICA: ICD-10-CM

## 2022-10-05 DIAGNOSIS — K21.9 GASTROESOPHAGEAL REFLUX DISEASE, UNSPECIFIED WHETHER ESOPHAGITIS PRESENT: ICD-10-CM

## 2022-10-05 DIAGNOSIS — M85.80 OSTEOPENIA, UNSPECIFIED LOCATION: ICD-10-CM

## 2022-10-05 DIAGNOSIS — E03.9 ACQUIRED HYPOTHYROIDISM: ICD-10-CM

## 2022-10-05 DIAGNOSIS — R42 VERTIGO: ICD-10-CM

## 2022-10-05 DIAGNOSIS — I25.10 ATHEROSCLEROSIS OF NATIVE CORONARY ARTERY WITHOUT ANGINA PECTORIS, UNSPECIFIED WHETHER NATIVE OR TRANSPLANTED HEART: ICD-10-CM

## 2022-10-05 DIAGNOSIS — Z00.00 ENCOUNTER FOR PREVENTIVE HEALTH EXAMINATION: Primary | ICD-10-CM

## 2022-10-05 DIAGNOSIS — E78.5 DYSLIPIDEMIA: ICD-10-CM

## 2022-10-05 PROCEDURE — 3288F PR FALLS RISK ASSESSMENT DOCUMENTED: ICD-10-PCS | Mod: CPTII,S$GLB,, | Performed by: NURSE PRACTITIONER

## 2022-10-05 PROCEDURE — 1159F MED LIST DOCD IN RCRD: CPT | Mod: CPTII,S$GLB,, | Performed by: NURSE PRACTITIONER

## 2022-10-05 PROCEDURE — 1170F PR FUNCTIONAL STATUS ASSESSED: ICD-10-PCS | Mod: CPTII,S$GLB,, | Performed by: NURSE PRACTITIONER

## 2022-10-05 PROCEDURE — 1101F PT FALLS ASSESS-DOCD LE1/YR: CPT | Mod: CPTII,S$GLB,, | Performed by: NURSE PRACTITIONER

## 2022-10-05 PROCEDURE — 1160F PR REVIEW ALL MEDS BY PRESCRIBER/CLIN PHARMACIST DOCUMENTED: ICD-10-PCS | Mod: CPTII,S$GLB,, | Performed by: NURSE PRACTITIONER

## 2022-10-05 PROCEDURE — 3044F HG A1C LEVEL LT 7.0%: CPT | Mod: CPTII,S$GLB,, | Performed by: NURSE PRACTITIONER

## 2022-10-05 PROCEDURE — 1101F PR PT FALLS ASSESS DOC 0-1 FALLS W/OUT INJ PAST YR: ICD-10-PCS | Mod: CPTII,S$GLB,, | Performed by: NURSE PRACTITIONER

## 2022-10-05 PROCEDURE — 3008F PR BODY MASS INDEX (BMI) DOCUMENTED: ICD-10-PCS | Mod: CPTII,S$GLB,, | Performed by: NURSE PRACTITIONER

## 2022-10-05 PROCEDURE — G0439 PPPS, SUBSEQ VISIT: HCPCS | Mod: S$GLB,,, | Performed by: NURSE PRACTITIONER

## 2022-10-05 PROCEDURE — 1170F FXNL STATUS ASSESSED: CPT | Mod: CPTII,S$GLB,, | Performed by: NURSE PRACTITIONER

## 2022-10-05 PROCEDURE — 3044F PR MOST RECENT HEMOGLOBIN A1C LEVEL <7.0%: ICD-10-PCS | Mod: CPTII,S$GLB,, | Performed by: NURSE PRACTITIONER

## 2022-10-05 PROCEDURE — 3008F BODY MASS INDEX DOCD: CPT | Mod: CPTII,S$GLB,, | Performed by: NURSE PRACTITIONER

## 2022-10-05 PROCEDURE — G0439 PR MEDICARE ANNUAL WELLNESS SUBSEQUENT VISIT: ICD-10-PCS | Mod: S$GLB,,, | Performed by: NURSE PRACTITIONER

## 2022-10-05 PROCEDURE — 3288F FALL RISK ASSESSMENT DOCD: CPT | Mod: CPTII,S$GLB,, | Performed by: NURSE PRACTITIONER

## 2022-10-05 PROCEDURE — 1159F PR MEDICATION LIST DOCUMENTED IN MEDICAL RECORD: ICD-10-PCS | Mod: CPTII,S$GLB,, | Performed by: NURSE PRACTITIONER

## 2022-10-05 PROCEDURE — 1160F RVW MEDS BY RX/DR IN RCRD: CPT | Mod: CPTII,S$GLB,, | Performed by: NURSE PRACTITIONER

## 2022-10-05 NOTE — PATIENT INSTRUCTIONS
Counseling and Referral of Other Preventative  (Italic type indicates deductible and co-insurance are waived)    Patient Name: Flores Adamson  Today's Date: 10/5/2022    Health Maintenance       Date Due Completion Date    TETANUS VACCINE Never done ---    Shingles Vaccine (1 of 2) Never done ---    COVID-19 Vaccine (4 - Booster for Pfizer series) 12/23/2021 10/28/2021    DEXA Scan 08/12/2022 8/12/2019    Influenza Vaccine (1) 09/01/2022 10/25/2021    Mammogram 05/19/2023 5/19/2022    Override on 8/10/2016: Done ([place].  BI-RADS 1 - Negative.)    Lipid Panel 09/29/2023 9/29/2022    High Dose Statin 10/05/2023 10/5/2022    Aspirin/Antiplatelet Therapy 10/05/2023 10/5/2022    Colorectal Cancer Screening 08/14/2025 8/14/2015        No orders of the defined types were placed in this encounter.    The following information is provided to all patients.  This information is to help you find resources for any of the problems found today that may be affecting your health:                Living healthy guide: www.[facility].[place].gov      Understanding Diabetes: www.diabetes.org      Eating healthy: www.cdc.gov/healthyweight      CDC home safety checklist: www.cdc.gov/steadi/patient.html      Agency on Aging: www.goea.[place].[facility]      Alcoholics anonymous (AA): www.aa.org      Physical Activity: www.kaye.nih.gov/kt4cxly      Tobacco use: www.quitwithusla.org

## 2022-10-05 NOTE — TELEPHONE ENCOUNTER
----- Message from Concepción Penn NP sent at 10/5/2022 11:55 AM CDT -----  Regarding: Needs f/u appt  Patient would like to schedule an appt, please give her a call.  Thank You!

## 2022-10-07 ENCOUNTER — OFFICE VISIT (OUTPATIENT)
Dept: FAMILY MEDICINE | Facility: CLINIC | Age: 72
End: 2022-10-07
Payer: MEDICARE

## 2022-10-07 VITALS
WEIGHT: 180 LBS | TEMPERATURE: 98 F | HEIGHT: 62 IN | SYSTOLIC BLOOD PRESSURE: 116 MMHG | BODY MASS INDEX: 33.13 KG/M2 | HEART RATE: 72 BPM | OXYGEN SATURATION: 97 % | DIASTOLIC BLOOD PRESSURE: 74 MMHG

## 2022-10-07 DIAGNOSIS — Z00.00 WELL ADULT EXAM: Primary | ICD-10-CM

## 2022-10-07 DIAGNOSIS — Z79.899 ENCOUNTER FOR LONG-TERM (CURRENT) USE OF MEDICATIONS: ICD-10-CM

## 2022-10-07 DIAGNOSIS — E03.9 ACQUIRED HYPOTHYROIDISM: ICD-10-CM

## 2022-10-07 DIAGNOSIS — Z13.220 ENCOUNTER FOR LIPID SCREENING FOR CARDIOVASCULAR DISEASE: ICD-10-CM

## 2022-10-07 DIAGNOSIS — Z13.6 ENCOUNTER FOR LIPID SCREENING FOR CARDIOVASCULAR DISEASE: ICD-10-CM

## 2022-10-07 DIAGNOSIS — I25.119 CORONARY ARTERY DISEASE INVOLVING NATIVE CORONARY ARTERY OF NATIVE HEART WITH ANGINA PECTORIS: ICD-10-CM

## 2022-10-07 PROCEDURE — 3008F BODY MASS INDEX DOCD: CPT | Mod: CPTII,S$GLB,, | Performed by: FAMILY MEDICINE

## 2022-10-07 PROCEDURE — 3288F PR FALLS RISK ASSESSMENT DOCUMENTED: ICD-10-PCS | Mod: CPTII,S$GLB,, | Performed by: FAMILY MEDICINE

## 2022-10-07 PROCEDURE — 1159F MED LIST DOCD IN RCRD: CPT | Mod: CPTII,S$GLB,, | Performed by: FAMILY MEDICINE

## 2022-10-07 PROCEDURE — 1126F PR PAIN SEVERITY QUANTIFIED, NO PAIN PRESENT: ICD-10-PCS | Mod: CPTII,S$GLB,, | Performed by: FAMILY MEDICINE

## 2022-10-07 PROCEDURE — 1159F PR MEDICATION LIST DOCUMENTED IN MEDICAL RECORD: ICD-10-PCS | Mod: CPTII,S$GLB,, | Performed by: FAMILY MEDICINE

## 2022-10-07 PROCEDURE — 3044F PR MOST RECENT HEMOGLOBIN A1C LEVEL <7.0%: ICD-10-PCS | Mod: CPTII,S$GLB,, | Performed by: FAMILY MEDICINE

## 2022-10-07 PROCEDURE — 3288F FALL RISK ASSESSMENT DOCD: CPT | Mod: CPTII,S$GLB,, | Performed by: FAMILY MEDICINE

## 2022-10-07 PROCEDURE — 3044F HG A1C LEVEL LT 7.0%: CPT | Mod: CPTII,S$GLB,, | Performed by: FAMILY MEDICINE

## 2022-10-07 PROCEDURE — 99999 PR PBB SHADOW E&M-EST. PATIENT-LVL V: CPT | Mod: PBBFAC,,, | Performed by: FAMILY MEDICINE

## 2022-10-07 PROCEDURE — 1126F AMNT PAIN NOTED NONE PRSNT: CPT | Mod: CPTII,S$GLB,, | Performed by: FAMILY MEDICINE

## 2022-10-07 PROCEDURE — 1101F PT FALLS ASSESS-DOCD LE1/YR: CPT | Mod: CPTII,S$GLB,, | Performed by: FAMILY MEDICINE

## 2022-10-07 PROCEDURE — 99397 PER PM REEVAL EST PAT 65+ YR: CPT | Mod: S$GLB,,, | Performed by: FAMILY MEDICINE

## 2022-10-07 PROCEDURE — 1160F PR REVIEW ALL MEDS BY PRESCRIBER/CLIN PHARMACIST DOCUMENTED: ICD-10-PCS | Mod: CPTII,S$GLB,, | Performed by: FAMILY MEDICINE

## 2022-10-07 PROCEDURE — 3008F PR BODY MASS INDEX (BMI) DOCUMENTED: ICD-10-PCS | Mod: CPTII,S$GLB,, | Performed by: FAMILY MEDICINE

## 2022-10-07 PROCEDURE — 99397 PR PREVENTIVE VISIT,EST,65 & OVER: ICD-10-PCS | Mod: S$GLB,,, | Performed by: FAMILY MEDICINE

## 2022-10-07 PROCEDURE — 99499 RISK ADDL DX/OHS AUDIT: ICD-10-PCS | Mod: HCNC,S$GLB,, | Performed by: FAMILY MEDICINE

## 2022-10-07 PROCEDURE — 1101F PR PT FALLS ASSESS DOC 0-1 FALLS W/OUT INJ PAST YR: ICD-10-PCS | Mod: CPTII,S$GLB,, | Performed by: FAMILY MEDICINE

## 2022-10-07 PROCEDURE — 1160F RVW MEDS BY RX/DR IN RCRD: CPT | Mod: CPTII,S$GLB,, | Performed by: FAMILY MEDICINE

## 2022-10-07 PROCEDURE — 99499 UNLISTED E&M SERVICE: CPT | Mod: HCNC,S$GLB,, | Performed by: FAMILY MEDICINE

## 2022-10-07 PROCEDURE — 3074F SYST BP LT 130 MM HG: CPT | Mod: CPTII,S$GLB,, | Performed by: FAMILY MEDICINE

## 2022-10-07 PROCEDURE — 99999 PR PBB SHADOW E&M-EST. PATIENT-LVL V: ICD-10-PCS | Mod: PBBFAC,,, | Performed by: FAMILY MEDICINE

## 2022-10-07 PROCEDURE — 3078F DIAST BP <80 MM HG: CPT | Mod: CPTII,S$GLB,, | Performed by: FAMILY MEDICINE

## 2022-10-07 PROCEDURE — 3078F PR MOST RECENT DIASTOLIC BLOOD PRESSURE < 80 MM HG: ICD-10-PCS | Mod: CPTII,S$GLB,, | Performed by: FAMILY MEDICINE

## 2022-10-07 PROCEDURE — 3074F PR MOST RECENT SYSTOLIC BLOOD PRESSURE < 130 MM HG: ICD-10-PCS | Mod: CPTII,S$GLB,, | Performed by: FAMILY MEDICINE

## 2022-10-07 RX ORDER — TRAMADOL HYDROCHLORIDE 50 MG/1
TABLET ORAL
COMMUNITY
Start: 2022-09-26

## 2022-10-07 NOTE — PATIENT INSTRUCTIONS
Follow up in about 1 year (around 10/7/2023) for annual wellness.     Dear patient,   As a result of recent federal legislation (The Federal Cures Act), you may receive lab or pathology results from your visit in your [facility] account before your physician is able to contact you. Your physician or their representative will relay the results to you with their recommendations at their soonest availability.     If no improvement in symptoms or symptoms worsen, please be advised to call MD, follow-up at clinic and/or go to ER if becomes severe.    Agustín Carter M.D.        We Offer TELEHEALTH & Same Day Appointments!   Book your Telehealth appointment with me through my nurse or   Clinic appointments on [email or web address]!    [address] [place] [address]    Office: [phone]   FAX: [phone]    Check out my Facebook Page and Follow Me at: [email or web address]    Check out my website at [email or web address] by clicking on: [email or web address]    To Schedule appointments online, go to [email or web address]: [email or web address]

## 2022-10-09 PROBLEM — E03.9 ACQUIRED HYPOTHYROIDISM: Chronic | Status: ACTIVE | Noted: 2021-10-06

## 2022-10-09 PROBLEM — Z79.899 ENCOUNTER FOR LONG-TERM (CURRENT) USE OF MEDICATIONS: Chronic | Status: ACTIVE | Noted: 2020-09-30

## 2022-10-09 NOTE — PROGRESS NOTES
This note is specifically for wellness visit performed today.     WELLNESS EXAM      Patient ID: Flores Adamson is a 72 y.o. female with  has a past medical history of Acquired hypothyroidism (10/6/2021), Angina pectoris, Anticoagulant long-term use, CAD (coronary artery disease), and GERD (gastroesophageal reflux disease).     Chief Complaint:  Encounter for wellness exam    Well Adult Physical: Patient here for a comprehensive physical exam.The patient reports chronic conditions.  The patient's last visit with me was on 10/6/2021.    Reviewed Care team:  Previous PCP: Dr. Jr Badillo   GYN:  Dr. Raza   Cardiology Dr. Hoang   GI: Dr. Christiano Damian Malathi   Gen Surg Dr. Roach    Hyperlipidemia:  CHRONIC. STABLE. Lab analysis reviewed.   (-) CP, SOB, abdominal pain, N/V/D, constipation, jaundice, skin changes.  (-) Myalgias  Lab Results   Component Value Date    CHOL 119 (L) 09/29/2022    CHOL 127 09/09/2021    CHOL 132 10/05/2020     Lab Results   Component Value Date    HDL 49 09/29/2022    HDL 54 09/09/2021    HDL 52 10/05/2020     Lab Results   Component Value Date    LDLCALC 51.2 (L) 09/29/2022    LDLCALC 58.4 (L) 09/09/2021    LDLCALC 65.8 10/05/2020     Lab Results   Component Value Date    TRIG 94 09/29/2022    TRIG 73 09/09/2021    TRIG 71 10/05/2020     Lab Results   Component Value Date    CHOLHDL 41.2 09/29/2022    CHOLHDL 42.5 09/09/2021    CHOLHDL 39.4 10/05/2020     Lab Results   Component Value Date    TOTALCHOLEST 2.4 09/29/2022    TOTALCHOLEST 2.4 09/09/2021    TOTALCHOLEST 2.5 10/05/2020     Lab Results   Component Value Date    ALT 27 09/29/2022    AST 30 09/29/2022    ALKPHOS 112 09/29/2022    BILITOT 0.8 09/29/2022     ======================================================  The ASCVD Risk score (Logan DK, et al., 2019) failed to calculate for the following reasons:    The valid total cholesterol range is 130 to 320 mg/dL    hypothyroidism:  Chronic.  Stable.  On levothyroxine  25 micrograms daily.  Reports compliance.  No side effects reported.  Lab Results   Component Value Date    TSH 2.579 2021    FREET4 0.87 10/05/2020     GERD:  Chronic.  Stable.  On Protonix 40 milligrams daily.  Reports compliance.  No side effects reported.  PMR:  Following with Rheumatology.  On anti-inflammatory.    Do you take any herbs or supplements that were not prescribed by a doctor? no   Are you taking calcium supplements?  Yes  Are you taking aspirin daily?  Yes  No results found for: UIBC, IRON, IRON, TRANS, TRANSFERRIN, TIBC, TIBC, LABIRON, FESATURATED   Lab Results   Component Value Date    MGBSRDSZ95 747 07/10/2018     No results found for: FOLATE    LMP: No LMP recorded. Patient is postmenopausal.   MM2022 6:25 AM CDT    REASON FOR EXAM: [Z12.31]-Encounter for screening mammogram for malignant neoplasm of breast     TECHNICAL FACTORS: Digital mammography with tomosynthesis was performed of the breasts in the mediolateral oblique and craniocaudal views. CAD was utilized.    CLINICAL INFORMATION: This is a female patient for screening mammogram. According to the National Cancer [place] Model risk assessment tool, her lifetime breast cancer risk is 3.1% .    COMPARISON: 2021, 10/14/2019     FINDINGS: There are scattered areas of fibroglandular density. There is no evidence of suspicious mass, calcifications or architectural distortion. There is no adverse interval change observed.     IMPRESSION:  BI-RADS 1 - Negative.     No mammographic findings of malignancy are identified. Annual mammography is recommended.    BREAST DENSITY: Scattered Fibroglandular    The patient has been entered into our radiology information system, and she will receive notification approximately 30 days prior to the due date of her next annual screening mammogram.    Electronically signed by Osman Jackson MD on 2022 6:25 AM    PAP:  Not indicated  Colonoscopy:    Findings:        The colon  (entire examined portion) appeared normal, including        terminal ileum.   Impression:          - The entire examined colon is normal.                        - No specimens collected.   Recommendation:      - Repeat colonoscopy in 10 years for screening                        purposes, or PRN.                        - Discharge patient to home (ambulatory).   Marvin Alvarado MD   8/3/2015 11:37:49 AM   This report has been verified and signed electronically.   Number of Addenda: 0   Note Initiated On: 8/3/2015 6:59 AM   Estimated Blood Loss:        Estimated blood loss: none.      Specimen Collected: 08/03/15 06:59        DEXA:  Performed through OBGYN, no records available at this time.    Health Maintenance Topics with due status: Not Due       Topic Last Completion Date    Colorectal Cancer Screening 08/14/2015    Mammogram 05/19/2022    Lipid Panel 09/29/2022    High Dose Statin 10/07/2022    Aspirin/Antiplatelet Therapy 10/07/2022        ==============================================  History reviewed.   Health Maintenance Due   Topic Date Due    TETANUS VACCINE  Never done    Shingles Vaccine (1 of 2) Never done    DEXA Scan  08/12/2022       Past Medical History:  Past Medical History:   Diagnosis Date    Acquired hypothyroidism 10/6/2021    Angina pectoris     Anticoagulant long-term use     CAD (coronary artery disease)     with stents x 3    GERD (gastroesophageal reflux disease)      Past Surgical History:   Procedure Laterality Date    2006 2 stents      2009 1 stent      CHOLECYSTECTOMY  2021    Dr. Roach    ESOPHAGOGASTRODUODENOSCOPY N/A 11/28/2018    Procedure: EGD (ESOPHAGOGASTRODUODENOSCOPY);  Surgeon: Marvin Alvarado MD;  Location: [facility];  Service: Endoscopy;  Laterality: N/A;    WRIST FRACTURE SURGERY  03/2018     Review of patient's allergies indicates:   Allergen Reactions    Sulfa (sulfonamide antibiotics) Shortness Of Breath     Current Outpatient Medications on File Prior to  Visit   Medication Sig Dispense Refill    ascorbic acid, vitamin C, (VITAMIN C) 500 MG tablet Take 500 mg by mouth once daily.      aspirin (ECOTRIN) 81 MG EC tablet Take 81 mg by mouth once daily.      atorvastatin (LIPITOR) 40 MG tablet TAKE 1 TABLET EVERY DAY 90 tablet 2    calcium-vitamin D3 (OS-SHEILA 500 + D3) 500 mg(1,250mg) -200 unit per tablet Take 1 tablet by mouth 2 (two) times daily with meals.      diclofenac (VOLTAREN) 75 MG EC tablet Take 75 mg by mouth once daily.      famotidine (PEPCID) 20 MG tablet TAKE 1 TABLET TWICE DAILY AS NEEDED FOR HEARTBURN. 180 tablet 1    fluticasone propionate (FLONASE) 50 mcg/actuation nasal spray SHAKE LIQUID AND USE 1 SPRAY(50 MCG) IN EACH NOSTRIL EVERY DAY 48 g 0    gabapentin (NEURONTIN) 300 MG capsule TAKE 3 CAPSULES EVERY EVENING 90 capsule 12    levothyroxine (SYNTHROID) 25 MCG tablet TAKE 1 TABLET EVERY DAY. REPEAT TSH IN 2 MONTHS 90 tablet 0    montelukast (SINGULAIR) 10 mg tablet TAKE 1 TABLET EVERY DAY 90 tablet 0    multivitamin (THERAGRAN) per tablet Take 1 tablet by mouth once daily.      nitroGLYCERIN (NITROSTAT) 0.4 MG SL tablet Place 0.4 mg under the tongue.      pantoprazole (PROTONIX) 40 MG tablet TAKE 1 TABLET TWICE DAILY 180 tablet 0    traMADoL (ULTRAM) 50 mg tablet       benzonatate (TESSALON) 100 MG capsule Take 1 capsule (100 mg total) by mouth 3 (three) times daily as needed for Cough. (Patient not taking: No sig reported) 30 capsule 0     No current facility-administered medications on file prior to visit.     Social History     Socioeconomic History    Marital status:     Number of children: 3   Tobacco Use    Smoking status: Never    Smokeless tobacco: Never   Substance and Sexual Activity    Alcohol use: No    Drug use: No   Social History Narrative    ** Merged History Encounter **          Social Determinants of Health     Financial Resource Strain: Low Risk     Difficulty of Paying Living Expenses: Not hard at all   Food Insecurity:  No Food Insecurity    Worried About Running Out of Food in the Last Year: Never true    Ran Out of Food in the Last Year: Never true   Transportation Needs: No Transportation Needs    Lack of Transportation (Medical): No    Lack of Transportation (Non-Medical): No   Physical Activity: Inactive    Days of Exercise per Week: 0 days    Minutes of Exercise per Session: 0 min   Stress: No Stress Concern Present    Feeling of Stress : Not at all   Social Connections: Moderately Isolated    Frequency of Communication with Friends and Family: More than three times a week    Frequency of Social Gatherings with Friends and Family: More than three times a week    Attends Gnosticist Services: Never    Active Member of Clubs or Organizations: No    Attends Club or Organization Meetings: Never    Marital Status:    Housing Stability: Unknown    Unable to Pay for Housing in the Last Year: No    Unstable Housing in the Last Year: No     Family History   Problem Relation Age of Onset    Heart disease Father        Review of Systems   Constitutional:  Negative for chills, fatigue, fever and unexpected weight change.   HENT:  Negative for ear pain and sore throat.    Eyes:  Negative for redness and visual disturbance.   Respiratory:  Negative for cough and shortness of breath.    Cardiovascular:  Negative for chest pain and palpitations.   Gastrointestinal:  Negative for nausea and vomiting.   Genitourinary:  Negative for difficulty urinating and hematuria.   Musculoskeletal:  Positive for myalgias and neck pain. Negative for arthralgias.   Skin:  Negative for rash and wound.   Neurological:  Negative for weakness and headaches.   Psychiatric/Behavioral:  Negative for sleep disturbance. The patient is not nervous/anxious.       Objective:    Nursing note and vitals reviewed.  Vitals:    10/07/22 1336   BP: 116/74   Pulse: 72   Temp: 98.2 °F (36.8 °C)     Body mass index is 32.92 kg/m².     Physical Exam  Vitals and nursing note  reviewed.   Constitutional:       General: She is not in acute distress.     Appearance: She is well-developed.   HENT:      Head: Normocephalic and atraumatic.   Eyes:      Pupils: Pupils are equal, round, and reactive to light.   Cardiovascular:      Rate and Rhythm: Normal rate and regular rhythm.      Heart sounds: Normal heart sounds. No murmur heard.  Pulmonary:      Effort: Pulmonary effort is normal. No respiratory distress.      Breath sounds: Normal breath sounds. No wheezing.   Abdominal:      General: Bowel sounds are normal.      Palpations: Abdomen is soft.   Musculoskeletal:         General: Normal range of motion.      Cervical back: Normal range of motion and neck supple. No swelling, deformity, lacerations, spasms, tenderness or bony tenderness.   Skin:     General: Skin is warm and dry.      Capillary Refill: Capillary refill takes less than 2 seconds.   Neurological:      Mental Status: She is alert and oriented to person, place, and time.      Cranial Nerves: No cranial nerve deficit.   Psychiatric:         Behavior: Behavior normal.     Lab Results   Component Value Date    WBC 4.71 09/29/2022    HGB 11.9 (L) 09/29/2022    HCT 37.3 09/29/2022    MCV 94 09/29/2022     09/29/2022         Chemistry        Component Value Date/Time     09/29/2022 1420    K 4.5 09/29/2022 1420     09/29/2022 1420    CO2 23 09/29/2022 1420    BUN 11 09/29/2022 1420    CREATININE 0.8 09/29/2022 1420    GLU 94 09/29/2022 1420        Component Value Date/Time    CALCIUM 9.4 09/29/2022 1420    ALKPHOS 112 09/29/2022 1420    AST 30 09/29/2022 1420    ALT 27 09/29/2022 1420    BILITOT 0.8 09/29/2022 1420    ESTGFRAFRICA >60.0 07/22/2022 1422    EGFRNONAA >60.0 07/22/2022 1422          Lab Results   Component Value Date    TSH 2.579 09/09/2021    FREET4 0.87 10/05/2020                 Assessment / Plan:      1.  ANNUAL WELLNESS EXAM -patient here for annual wellness exam.  Labs ordered.  Health  maintenance was reviewed and ordered.  Medications were reviewed and reconciled.  Please be advised of hypothyroidism disease course.  We will plan to monitor thyroid labs at routine intervals.  Please be advised of risks and benefits of medication use, see medication insert for complete details.  ER precautions.  GERD RECOMMENDATIONS  Please be advised of condition course.  - Take PPI in the morning 30-60 minutes before breakfast  - I recommend ongoing Education for lifestyle modifications to help control/reduce reflux/abdominal pain including: avoid large meals, avoid eating within 2-3 hours of bedtime (avoid late night eating & lying down soon after eating), elevate head of bed if nocturnal symptoms are present, smoking cessation (if current smoker), & weight loss (if overweight).   - please be advised to avoid known foods which trigger reflux symptoms & to minimize/avoid high-fat foods, chocolate, caffeine, citrus, alcohol, & tomato products.  - Advised to avoid/limit use of NSAID's, since they can cause GI upset, bleeding, and/or ulcers. If needed, take with food.   Complete history and physical was completed today.  Complete and thorough medication reconciliation was performed.  Discussed risks and benefits of medications.  Advised patient on orders and health maintenance.  We discussed old records and old labs if available.  Will request any records not available through epic.  Continue current medications listed on your summary sheet.  PMR:  Follow-up with Rheumatology.  Continue diclofenac.  Continue gabapentin.  Patient is getting tramadol from Rheumatology.  All questions were answered. Patient had no further concerns. Advised of Wellness plan. Follow up in 1 year for ANNUAL WELLNESS EXAM  .Counseled on hyperlipidemia disease course, healthy diet and increased need for exercise.  Please be advised of the risk of cardiovascular disease, increase stroke and heart attack risk with uncontrolled/untreated  hyperlipidemia.     Patient voiced understanding and understood the treatment plan. All questions were answered.     Mild hypothyroidism:  Patient is interested in coming off of the medication.  She will recheck thyroid labs.  If within normal limits patient can have a trial off of medications since it is that such at low dose to see if she has any symptoms.    Please be advised of hypothyroidism disease course.  We will plan to monitor thyroid labs at routine intervals.  Please be advised of risks and benefits of medication use, see medication insert for complete details.  ER precautions.  Common complaints from hypothyroidism include weight gain, fatigue, cold intolerance, constipation, dry and flaky shin, coarse or loss of hair, and trouble with memory.     Complaints with hyperthyroidism are weight loss or decrease in appetite, weakness and fatigue, visual changes, fast heart rate, decreased heat tolerance, thinning scalp hair, change in bowel habits, and palpations.    History of CAD: Continue follow-up with Cardiology.  Continue statin, aspirin good control of blood pressure.  Avoid excessive amounts of anti-inflammatory.  ER precautions.  Future Appointments       Date Provider Specialty Appt Notes    10/24/2022 Reji Hoang MD Cardiology 9 MTH F/U     10/3/2023 Agustín Carter MD Family Medicine annual            Orders Placed This Encounter   Procedures    TSH     Standing Status:   Future     Standing Expiration Date:   12/6/2023    T4, Free     Standing Status:   Future     Standing Expiration Date:   12/6/2023               Agustín Carter MD

## 2022-10-11 ENCOUNTER — PATIENT MESSAGE (OUTPATIENT)
Dept: FAMILY MEDICINE | Facility: CLINIC | Age: 72
End: 2022-10-11
Payer: MEDICARE

## 2022-10-24 ENCOUNTER — OFFICE VISIT (OUTPATIENT)
Dept: CARDIOLOGY | Facility: CLINIC | Age: 72
End: 2022-10-24
Payer: MEDICARE

## 2022-10-24 VITALS
DIASTOLIC BLOOD PRESSURE: 73 MMHG | SYSTOLIC BLOOD PRESSURE: 127 MMHG | WEIGHT: 182.31 LBS | HEART RATE: 79 BPM | HEIGHT: 62 IN | BODY MASS INDEX: 33.55 KG/M2

## 2022-10-24 DIAGNOSIS — I25.119 CORONARY ARTERY DISEASE INVOLVING NATIVE CORONARY ARTERY OF NATIVE HEART WITH ANGINA PECTORIS: Primary | Chronic | ICD-10-CM

## 2022-10-24 DIAGNOSIS — E78.5 DYSLIPIDEMIA: ICD-10-CM

## 2022-10-24 PROCEDURE — 3044F PR MOST RECENT HEMOGLOBIN A1C LEVEL <7.0%: ICD-10-PCS | Mod: CPTII,S$GLB,, | Performed by: INTERNAL MEDICINE

## 2022-10-24 PROCEDURE — 3288F FALL RISK ASSESSMENT DOCD: CPT | Mod: CPTII,S$GLB,, | Performed by: INTERNAL MEDICINE

## 2022-10-24 PROCEDURE — 99214 PR OFFICE/OUTPT VISIT, EST, LEVL IV, 30-39 MIN: ICD-10-PCS | Mod: S$GLB,,, | Performed by: INTERNAL MEDICINE

## 2022-10-24 PROCEDURE — 1101F PT FALLS ASSESS-DOCD LE1/YR: CPT | Mod: CPTII,S$GLB,, | Performed by: INTERNAL MEDICINE

## 2022-10-24 PROCEDURE — 1125F PR PAIN SEVERITY QUANTIFIED, PAIN PRESENT: ICD-10-PCS | Mod: CPTII,S$GLB,, | Performed by: INTERNAL MEDICINE

## 2022-10-24 PROCEDURE — 1125F AMNT PAIN NOTED PAIN PRSNT: CPT | Mod: CPTII,S$GLB,, | Performed by: INTERNAL MEDICINE

## 2022-10-24 PROCEDURE — 99214 OFFICE O/P EST MOD 30 MIN: CPT | Mod: S$GLB,,, | Performed by: INTERNAL MEDICINE

## 2022-10-24 PROCEDURE — 1159F PR MEDICATION LIST DOCUMENTED IN MEDICAL RECORD: ICD-10-PCS | Mod: CPTII,S$GLB,, | Performed by: INTERNAL MEDICINE

## 2022-10-24 PROCEDURE — 3078F PR MOST RECENT DIASTOLIC BLOOD PRESSURE < 80 MM HG: ICD-10-PCS | Mod: CPTII,S$GLB,, | Performed by: INTERNAL MEDICINE

## 2022-10-24 PROCEDURE — 3078F DIAST BP <80 MM HG: CPT | Mod: CPTII,S$GLB,, | Performed by: INTERNAL MEDICINE

## 2022-10-24 PROCEDURE — 3288F PR FALLS RISK ASSESSMENT DOCUMENTED: ICD-10-PCS | Mod: CPTII,S$GLB,, | Performed by: INTERNAL MEDICINE

## 2022-10-24 PROCEDURE — 99999 PR PBB SHADOW E&M-EST. PATIENT-LVL III: ICD-10-PCS | Mod: PBBFAC,,, | Performed by: INTERNAL MEDICINE

## 2022-10-24 PROCEDURE — 1160F RVW MEDS BY RX/DR IN RCRD: CPT | Mod: CPTII,S$GLB,, | Performed by: INTERNAL MEDICINE

## 2022-10-24 PROCEDURE — 3074F PR MOST RECENT SYSTOLIC BLOOD PRESSURE < 130 MM HG: ICD-10-PCS | Mod: CPTII,S$GLB,, | Performed by: INTERNAL MEDICINE

## 2022-10-24 PROCEDURE — 1101F PR PT FALLS ASSESS DOC 0-1 FALLS W/OUT INJ PAST YR: ICD-10-PCS | Mod: CPTII,S$GLB,, | Performed by: INTERNAL MEDICINE

## 2022-10-24 PROCEDURE — 99999 PR PBB SHADOW E&M-EST. PATIENT-LVL III: CPT | Mod: PBBFAC,,, | Performed by: INTERNAL MEDICINE

## 2022-10-24 PROCEDURE — 1160F PR REVIEW ALL MEDS BY PRESCRIBER/CLIN PHARMACIST DOCUMENTED: ICD-10-PCS | Mod: CPTII,S$GLB,, | Performed by: INTERNAL MEDICINE

## 2022-10-24 PROCEDURE — 3074F SYST BP LT 130 MM HG: CPT | Mod: CPTII,S$GLB,, | Performed by: INTERNAL MEDICINE

## 2022-10-24 PROCEDURE — 3044F HG A1C LEVEL LT 7.0%: CPT | Mod: CPTII,S$GLB,, | Performed by: INTERNAL MEDICINE

## 2022-10-24 PROCEDURE — 1159F MED LIST DOCD IN RCRD: CPT | Mod: CPTII,S$GLB,, | Performed by: INTERNAL MEDICINE

## 2022-10-24 NOTE — PROGRESS NOTES
Subjective:    Patient ID:  Flores Adamson is a 72 y.o. female who presents for follow-up of Coronary Artery Disease      HPI  She comes with no complaints, no chest pain, no shortness of breath  FC II    Review of Systems   Constitutional: Negative for decreased appetite, malaise/fatigue, weight gain and weight loss.   Cardiovascular:  Negative for chest pain, dyspnea on exertion, leg swelling, palpitations and syncope.   Respiratory:  Negative for cough and shortness of breath.    Gastrointestinal: Negative.    Neurological:  Negative for weakness.   All other systems reviewed and are negative.     Objective:      Physical Exam  Vitals and nursing note reviewed.   Constitutional:       Appearance: Normal appearance. She is well-developed.   HENT:      Head: Normocephalic.   Eyes:      Pupils: Pupils are equal, round, and reactive to light.   Neck:      Thyroid: No thyromegaly.      Vascular: No carotid bruit or JVD.   Cardiovascular:      Rate and Rhythm: Normal rate and regular rhythm.      Chest Wall: PMI is not displaced.      Pulses: Normal pulses and intact distal pulses.      Heart sounds: Normal heart sounds. No murmur heard.    No gallop.   Pulmonary:      Effort: Pulmonary effort is normal.      Breath sounds: Normal breath sounds.   Abdominal:      Palpations: Abdomen is soft. There is no mass.      Tenderness: There is no abdominal tenderness.   Musculoskeletal:         General: Normal range of motion.      Cervical back: Normal range of motion and neck supple.   Skin:     General: Skin is warm.   Neurological:      Mental Status: She is alert and oriented to person, place, and time.      Sensory: No sensory deficit.      Deep Tendon Reflexes: Reflexes are normal and symmetric.       Assessment:       1. Coronary artery disease involving native coronary artery of native heart with angina pectoris    2. Dyslipidemia         Plan:     Continue all cardiac medications  Regular exercise program  Weight  loss  1 yr f/u

## 2023-01-11 ENCOUNTER — TELEPHONE (OUTPATIENT)
Dept: FAMILY MEDICINE | Facility: CLINIC | Age: 73
End: 2023-01-11
Payer: MEDICARE

## 2023-01-12 ENCOUNTER — TELEPHONE (OUTPATIENT)
Dept: FAMILY MEDICINE | Facility: CLINIC | Age: 73
End: 2023-01-12
Payer: MEDICARE

## 2023-01-12 NOTE — TELEPHONE ENCOUNTER
----- Message from Reina Cantu sent at 1/12/2023  4:40 PM CST -----  Regarding: regarding pt and  (michael paredes)  Pt is requesting a call back regarding an appt for her arthritis and for her  because he is on ozempic and may need to see the dr also. Call back number [phone]  [facility] jm

## 2023-01-23 ENCOUNTER — LAB VISIT (OUTPATIENT)
Dept: LAB | Facility: HOSPITAL | Age: 73
End: 2023-01-23
Attending: INTERNAL MEDICINE
Payer: MEDICARE

## 2023-01-23 DIAGNOSIS — M35.3 POLYMYALGIA RHEUMATICA: Primary | ICD-10-CM

## 2023-01-23 LAB — CRP SERPL-MCNC: 3.4 MG/L (ref 0–8.2)

## 2023-01-23 PROCEDURE — 36415 COLL VENOUS BLD VENIPUNCTURE: CPT | Mod: HCNC,PO | Performed by: INTERNAL MEDICINE

## 2023-01-23 PROCEDURE — 86140 C-REACTIVE PROTEIN: CPT | Mod: HCNC | Performed by: INTERNAL MEDICINE

## 2023-02-07 DIAGNOSIS — Z00.00 ENCOUNTER FOR MEDICARE ANNUAL WELLNESS EXAM: ICD-10-CM

## 2023-02-09 DIAGNOSIS — Z00.00 ENCOUNTER FOR MEDICARE ANNUAL WELLNESS EXAM: ICD-10-CM

## 2023-04-24 ENCOUNTER — TELEPHONE (OUTPATIENT)
Dept: ADMINISTRATIVE | Facility: HOSPITAL | Age: 73
End: 2023-04-24
Payer: MEDICARE

## 2023-04-29 RX ORDER — PANTOPRAZOLE SODIUM 40 MG/1
TABLET, DELAYED RELEASE ORAL
Qty: 180 TABLET | Refills: 0 | OUTPATIENT
Start: 2023-04-29

## 2023-04-29 NOTE — TELEPHONE ENCOUNTER
No care due was identified.  [facility] Embedded Care Due Messages. Reference number: [id number].   4/29/2023 3:58:23 AM CDT

## 2023-04-29 NOTE — TELEPHONE ENCOUNTER
Refill Routing Note   Refill Routing Note   Medication(s) are not appropriate for processing by [facility] for the following reason(s):      Medication outside of protocol    [facility] action(s):  Defer       Medication Therapy Plan: Total daily dose outside of [facility] protocol  Medication reconciliation completed: No     Appointments  past 12m or future 3m with PCP    Date Provider   Last Visit   10/7/2022 Agustín Carter MD   Next Visit   10/3/2023 Agustín Carter MD   ED visits in past 90 days: 0        Note composed:5:31 PM 04/29/2023

## 2023-05-22 NOTE — TELEPHONE ENCOUNTER
No care due was identified.  [facility] Embedded Care Due Messages. Reference number: [id number].   5/22/2023 4:58:42 PM CDT

## 2023-05-22 NOTE — TELEPHONE ENCOUNTER
Refill Routing Note   Medication(s) are not appropriate for processing by [facility] for the following reason(s):      Medication outside of protocol    [facility] action(s):  Route None identified     Medication Therapy Plan: Patient's daily dose exceeds [facility] criteria      Appointments  past 12m or future 3m with PCP    Date Provider   Last Visit   10/7/2022 Agustín Carter MD   Next Visit   10/3/2023 Agustín Carter MD   ED visits in past 90 days: 0        Note composed:6:11 PM 05/22/2023

## 2023-05-23 ENCOUNTER — TELEPHONE (OUTPATIENT)
Dept: FAMILY MEDICINE | Facility: CLINIC | Age: 73
End: 2023-05-23
Payer: MEDICARE

## 2023-05-23 RX ORDER — PANTOPRAZOLE SODIUM 40 MG/1
TABLET, DELAYED RELEASE ORAL
Qty: 180 TABLET | Refills: 0 | Status: SHIPPED | OUTPATIENT
Start: 2023-05-23 | End: 2023-10-16

## 2023-05-23 NOTE — TELEPHONE ENCOUNTER
Called pt to let her know protonix has been sent to [facility] pharmacy patient verbalized understanding.

## 2023-05-23 NOTE — TELEPHONE ENCOUNTER
----- Message from Tanisha Pichardo sent at 5/23/2023 12:48 PM CDT -----  Contact: janae  Patient is calling to speak with the nurse regarding medication. Reports center well will be reaching out to get the medication pantoprazole (PROTONIX) 40 MG tablet refilled. Please give the patient a call back at [phone]  Thanks robb

## 2023-05-28 ENCOUNTER — TELEPHONE (OUTPATIENT)
Dept: ADMINISTRATIVE | Facility: HOSPITAL | Age: 73
End: 2023-05-28
Payer: MEDICARE

## 2023-06-10 ENCOUNTER — TELEPHONE (OUTPATIENT)
Dept: ADMINISTRATIVE | Facility: HOSPITAL | Age: 73
End: 2023-06-10
Payer: MEDICARE

## 2023-07-07 ENCOUNTER — PES CALL (OUTPATIENT)
Dept: ADMINISTRATIVE | Facility: CLINIC | Age: 73
End: 2023-07-07
Payer: MEDICARE

## 2023-07-12 ENCOUNTER — PES CALL (OUTPATIENT)
Dept: ADMINISTRATIVE | Facility: CLINIC | Age: 73
End: 2023-07-12
Payer: MEDICARE

## 2023-09-18 ENCOUNTER — OFFICE VISIT (OUTPATIENT)
Dept: FAMILY MEDICINE | Facility: CLINIC | Age: 73
End: 2023-09-18
Payer: MEDICARE

## 2023-09-18 ENCOUNTER — LAB VISIT (OUTPATIENT)
Dept: LAB | Facility: HOSPITAL | Age: 73
End: 2023-09-18
Attending: NURSE PRACTITIONER
Payer: MEDICARE

## 2023-09-18 VITALS
HEIGHT: 62 IN | OXYGEN SATURATION: 99 % | SYSTOLIC BLOOD PRESSURE: 139 MMHG | WEIGHT: 177.63 LBS | DIASTOLIC BLOOD PRESSURE: 83 MMHG | BODY MASS INDEX: 32.69 KG/M2 | HEART RATE: 74 BPM

## 2023-09-18 DIAGNOSIS — R42 DIZZINESS: ICD-10-CM

## 2023-09-18 DIAGNOSIS — D64.9 ANEMIA, UNSPECIFIED TYPE: ICD-10-CM

## 2023-09-18 DIAGNOSIS — R42 DIZZINESS: Primary | ICD-10-CM

## 2023-09-18 DIAGNOSIS — E78.5 DYSLIPIDEMIA: ICD-10-CM

## 2023-09-18 DIAGNOSIS — R11.0 NAUSEA: ICD-10-CM

## 2023-09-18 DIAGNOSIS — E03.9 ACQUIRED HYPOTHYROIDISM: Chronic | ICD-10-CM

## 2023-09-18 DIAGNOSIS — H61.23 CERUMINOSIS, BILATERAL: ICD-10-CM

## 2023-09-18 LAB
ALBUMIN SERPL BCP-MCNC: 3.8 G/DL (ref 3.5–5.2)
ALP SERPL-CCNC: 101 U/L (ref 55–135)
ALT SERPL W/O P-5'-P-CCNC: 29 U/L (ref 10–44)
ANION GAP SERPL CALC-SCNC: 6 MMOL/L (ref 8–16)
AST SERPL-CCNC: 28 U/L (ref 10–40)
BASOPHILS # BLD AUTO: 0.06 K/UL (ref 0–0.2)
BASOPHILS NFR BLD: 0.9 % (ref 0–1.9)
BILIRUB SERPL-MCNC: 0.7 MG/DL (ref 0.1–1)
BUN SERPL-MCNC: 12 MG/DL (ref 8–23)
CALCIUM SERPL-MCNC: 9.5 MG/DL (ref 8.7–10.5)
CHLORIDE SERPL-SCNC: 106 MMOL/L (ref 95–110)
CHOLEST SERPL-MCNC: 136 MG/DL (ref 120–199)
CHOLEST/HDLC SERPL: 2.2 {RATIO} (ref 2–5)
CO2 SERPL-SCNC: 30 MMOL/L (ref 23–29)
CREAT SERPL-MCNC: 0.9 MG/DL (ref 0.5–1.4)
DIFFERENTIAL METHOD: ABNORMAL
EOSINOPHIL # BLD AUTO: 0.2 K/UL (ref 0–0.5)
EOSINOPHIL NFR BLD: 3 % (ref 0–8)
ERYTHROCYTE [DISTWIDTH] IN BLOOD BY AUTOMATED COUNT: 12.6 % (ref 11.5–14.5)
EST. GFR  (NO RACE VARIABLE): >60 ML/MIN/1.73 M^2
FERRITIN SERPL-MCNC: 130 NG/ML (ref 20–300)
FOLATE SERPL-MCNC: 15.9 NG/ML (ref 4–24)
GLUCOSE SERPL-MCNC: 104 MG/DL (ref 70–110)
HCT VFR BLD AUTO: 40.8 % (ref 37–48.5)
HDLC SERPL-MCNC: 63 MG/DL (ref 40–75)
HDLC SERPL: 46.3 % (ref 20–50)
HGB BLD-MCNC: 12.9 G/DL (ref 12–16)
IMM GRANULOCYTES # BLD AUTO: 0.02 K/UL (ref 0–0.04)
IMM GRANULOCYTES NFR BLD AUTO: 0.3 % (ref 0–0.5)
IRON SERPL-MCNC: 132 UG/DL (ref 30–160)
LDLC SERPL CALC-MCNC: 60.6 MG/DL (ref 63–159)
LYMPHOCYTES # BLD AUTO: 1.4 K/UL (ref 1–4.8)
LYMPHOCYTES NFR BLD: 20.4 % (ref 18–48)
MCH RBC QN AUTO: 30.8 PG (ref 27–31)
MCHC RBC AUTO-ENTMCNC: 31.6 G/DL (ref 32–36)
MCV RBC AUTO: 97 FL (ref 82–98)
MONOCYTES # BLD AUTO: 0.5 K/UL (ref 0.3–1)
MONOCYTES NFR BLD: 7.6 % (ref 4–15)
NEUTROPHILS # BLD AUTO: 4.6 K/UL (ref 1.8–7.7)
NEUTROPHILS NFR BLD: 67.8 % (ref 38–73)
NONHDLC SERPL-MCNC: 73 MG/DL
NRBC BLD-RTO: 0 /100 WBC
PLATELET # BLD AUTO: 191 K/UL (ref 150–450)
PMV BLD AUTO: 12.4 FL (ref 9.2–12.9)
POTASSIUM SERPL-SCNC: 4.6 MMOL/L (ref 3.5–5.1)
PROT SERPL-MCNC: 7.6 G/DL (ref 6–8.4)
RBC # BLD AUTO: 4.19 M/UL (ref 4–5.4)
SATURATED IRON: 37 % (ref 20–50)
SODIUM SERPL-SCNC: 142 MMOL/L (ref 136–145)
TOTAL IRON BINDING CAPACITY: 358 UG/DL (ref 250–450)
TRANSFERRIN SERPL-MCNC: 242 MG/DL (ref 200–375)
TRIGL SERPL-MCNC: 62 MG/DL (ref 30–150)
TSH SERPL DL<=0.005 MIU/L-ACNC: 2.13 UIU/ML (ref 0.4–4)
VIT B12 SERPL-MCNC: >2000 PG/ML (ref 210–950)
WBC # BLD AUTO: 6.71 K/UL (ref 3.9–12.7)

## 2023-09-18 PROCEDURE — 1160F RVW MEDS BY RX/DR IN RCRD: CPT | Mod: HCNC,CPTII,S$GLB, | Performed by: NURSE PRACTITIONER

## 2023-09-18 PROCEDURE — 99999 PR PBB SHADOW E&M-EST. PATIENT-LVL IV: CPT | Mod: PBBFAC,HCNC,, | Performed by: NURSE PRACTITIONER

## 2023-09-18 PROCEDURE — 36415 COLL VENOUS BLD VENIPUNCTURE: CPT | Mod: HCNC,PO | Performed by: NURSE PRACTITIONER

## 2023-09-18 PROCEDURE — 3079F PR MOST RECENT DIASTOLIC BLOOD PRESSURE 80-89 MM HG: ICD-10-PCS | Mod: HCNC,CPTII,S$GLB, | Performed by: NURSE PRACTITIONER

## 2023-09-18 PROCEDURE — 1126F PR PAIN SEVERITY QUANTIFIED, NO PAIN PRESENT: ICD-10-PCS | Mod: HCNC,CPTII,S$GLB, | Performed by: NURSE PRACTITIONER

## 2023-09-18 PROCEDURE — 99999 PR PBB SHADOW E&M-EST. PATIENT-LVL IV: ICD-10-PCS | Mod: PBBFAC,HCNC,, | Performed by: NURSE PRACTITIONER

## 2023-09-18 PROCEDURE — 3075F SYST BP GE 130 - 139MM HG: CPT | Mod: HCNC,CPTII,S$GLB, | Performed by: NURSE PRACTITIONER

## 2023-09-18 PROCEDURE — 3079F DIAST BP 80-89 MM HG: CPT | Mod: HCNC,CPTII,S$GLB, | Performed by: NURSE PRACTITIONER

## 2023-09-18 PROCEDURE — 84466 ASSAY OF TRANSFERRIN: CPT | Mod: HCNC | Performed by: NURSE PRACTITIONER

## 2023-09-18 PROCEDURE — 80061 LIPID PANEL: CPT | Mod: HCNC | Performed by: NURSE PRACTITIONER

## 2023-09-18 PROCEDURE — 3075F PR MOST RECENT SYSTOLIC BLOOD PRESS GE 130-139MM HG: ICD-10-PCS | Mod: HCNC,CPTII,S$GLB, | Performed by: NURSE PRACTITIONER

## 2023-09-18 PROCEDURE — 1159F MED LIST DOCD IN RCRD: CPT | Mod: HCNC,CPTII,S$GLB, | Performed by: NURSE PRACTITIONER

## 2023-09-18 PROCEDURE — 83036 HEMOGLOBIN GLYCOSYLATED A1C: CPT | Mod: HCNC | Performed by: NURSE PRACTITIONER

## 2023-09-18 PROCEDURE — 82607 VITAMIN B-12: CPT | Mod: HCNC | Performed by: NURSE PRACTITIONER

## 2023-09-18 PROCEDURE — 85025 COMPLETE CBC W/AUTO DIFF WBC: CPT | Mod: HCNC | Performed by: NURSE PRACTITIONER

## 2023-09-18 PROCEDURE — 82746 ASSAY OF FOLIC ACID SERUM: CPT | Mod: HCNC | Performed by: NURSE PRACTITIONER

## 2023-09-18 PROCEDURE — 80053 COMPREHEN METABOLIC PANEL: CPT | Mod: HCNC | Performed by: NURSE PRACTITIONER

## 2023-09-18 PROCEDURE — 1160F PR REVIEW ALL MEDS BY PRESCRIBER/CLIN PHARMACIST DOCUMENTED: ICD-10-PCS | Mod: HCNC,CPTII,S$GLB, | Performed by: NURSE PRACTITIONER

## 2023-09-18 PROCEDURE — 82728 ASSAY OF FERRITIN: CPT | Mod: HCNC | Performed by: NURSE PRACTITIONER

## 2023-09-18 PROCEDURE — 99214 PR OFFICE/OUTPT VISIT, EST, LEVL IV, 30-39 MIN: ICD-10-PCS | Mod: HCNC,S$GLB,, | Performed by: NURSE PRACTITIONER

## 2023-09-18 PROCEDURE — 3008F BODY MASS INDEX DOCD: CPT | Mod: HCNC,CPTII,S$GLB, | Performed by: NURSE PRACTITIONER

## 2023-09-18 PROCEDURE — 1159F PR MEDICATION LIST DOCUMENTED IN MEDICAL RECORD: ICD-10-PCS | Mod: HCNC,CPTII,S$GLB, | Performed by: NURSE PRACTITIONER

## 2023-09-18 PROCEDURE — 3008F PR BODY MASS INDEX (BMI) DOCUMENTED: ICD-10-PCS | Mod: HCNC,CPTII,S$GLB, | Performed by: NURSE PRACTITIONER

## 2023-09-18 PROCEDURE — 84443 ASSAY THYROID STIM HORMONE: CPT | Mod: HCNC | Performed by: NURSE PRACTITIONER

## 2023-09-18 PROCEDURE — 99214 OFFICE O/P EST MOD 30 MIN: CPT | Mod: HCNC,S$GLB,, | Performed by: NURSE PRACTITIONER

## 2023-09-18 PROCEDURE — 1126F AMNT PAIN NOTED NONE PRSNT: CPT | Mod: HCNC,CPTII,S$GLB, | Performed by: NURSE PRACTITIONER

## 2023-09-18 RX ORDER — MECLIZINE HYDROCHLORIDE 25 MG/1
25 TABLET ORAL 3 TIMES DAILY PRN
Qty: 30 TABLET | Refills: 0 | Status: SHIPPED | OUTPATIENT
Start: 2023-09-18

## 2023-09-18 RX ORDER — MECLIZINE HYDROCHLORIDE 25 MG/1
25 TABLET ORAL 3 TIMES DAILY PRN
Qty: 30 TABLET | Refills: 0 | Status: SHIPPED | OUTPATIENT
Start: 2023-09-18 | End: 2023-09-18

## 2023-09-18 RX ORDER — ONDANSETRON HYDROCHLORIDE 8 MG/1
8 TABLET, FILM COATED ORAL EVERY 8 HOURS PRN
Qty: 10 TABLET | Refills: 0 | Status: SHIPPED | OUTPATIENT
Start: 2023-09-18

## 2023-09-18 RX ORDER — ONDANSETRON HYDROCHLORIDE 8 MG/1
8 TABLET, FILM COATED ORAL EVERY 8 HOURS PRN
Qty: 10 TABLET | Refills: 0 | Status: SHIPPED | OUTPATIENT
Start: 2023-09-18 | End: 2023-09-18

## 2023-09-18 NOTE — PROGRESS NOTES
Assessment/Plan:  Problem List Items Addressed This Visit          Cardiac/Vascular    Dyslipidemia    Overview     CHRONIC. STABLE. Lab analysis reviewed.   (-) CP, SOB, abdominal pain, N/V/D, constipation, jaundice, skin changes.  (-) Myalgias  Lab Results   Component Value Date    CHOL 119 (L) 09/29/2022    CHOL 127 09/09/2021    CHOL 132 10/05/2020     Lab Results   Component Value Date    HDL 49 09/29/2022    HDL 54 09/09/2021    HDL 52 10/05/2020     Lab Results   Component Value Date    LDLCALC 51.2 (L) 09/29/2022    LDLCALC 58.4 (L) 09/09/2021    LDLCALC 65.8 10/05/2020     Lab Results   Component Value Date    TRIG 94 09/29/2022    TRIG 73 09/09/2021    TRIG 71 10/05/2020     Lab Results   Component Value Date    CHOLHDL 41.2 09/29/2022    CHOLHDL 42.5 09/09/2021    CHOLHDL 39.4 10/05/2020     Lab Results   Component Value Date    TOTALCHOLEST 2.4 09/29/2022    TOTALCHOLEST 2.4 09/09/2021    TOTALCHOLEST 2.5 10/05/2020     Lab Results   Component Value Date    ALT 27 09/29/2022    AST 30 09/29/2022    ALKPHOS 112 09/29/2022    BILITOT 0.8 09/29/2022     ======================================================  The ASCVD Risk score (Celina ANDINO, et al., 2019) failed to calculate for the following reasons:    The valid total cholesterol range is 130 to 320 mg/dL           Current Assessment & Plan     Update labs. Counseled on hyperlipidemia disease course, healthy diet and increased need for exercise. Please be advised of the risk of cardiovascular disease, increase stroke and heart attack risk with uncontrolled/untreated hyperlipidemia. Patient voiced understanding and understood the treatment plan. All questions were answered.          Relevant Orders    Lipid Panel (Completed)       Oncology    Anemia    Overview     Chronic. Denies active bleeding. Last Colonoscopy completed on 8/3/2015.     Lab Results   Component Value Date    WBC 4.71 09/29/2022    HGB 11.9 (L) 09/29/2022    HCT 37.3 09/29/2022    MCV 94  "09/29/2022     09/29/2022     Lab Results   Component Value Date    GQMAOIGC61 747 07/10/2018        No results found for: "UIBC", "IRON", "TRANS", "TRANSFERRIN", "TIBC", "LABIRON", "FESATURATED"     No results found for: "FOLATE"            Current Assessment & Plan     Update labs. Check iron studies. Anemia precautions.          Relevant Orders    CBC Auto Differential (Completed)    IRON AND TIBC (Completed)    VITAMIN B12 (Completed)    FOLATE (Completed)    FERRITIN (Completed)       Endocrine    Acquired hypothyroidism (Chronic)    Overview     Chronic. Stable. Taking Levothyroxine 25 mcg daily.    Lab Results   Component Value Date    TSH 2.579 09/09/2021            Current Assessment & Plan     Update labs. Please be advised of hypothyroidism disease course.  We will plan to monitor thyroid labs at routine intervals.  Please be advised of risks and benefits of medication use, see medication insert for complete details.  ER precautions.         Relevant Orders    TSH (Completed)       GI    Nausea    Overview     Chronic. Intermittent. Worse with dizziness. Improves with Zofran. Requesting refill.          Current Assessment & Plan     Refill Zofran. Follow-up to clinic if not improving or worsening symptoms.         Relevant Medications    ondansetron (ZOFRAN) 8 MG tablet       Other    Dizziness - Primary    Overview     Chronic. Intermittent. Current episode started last week. Worse with changes in position. Associated with lightheadedness, nausea. She is not taking anything. Denies recent illness, otalgia, tinnitus, otorrhea, chest pain, SOB, neck pain, numbness/tingling, headaches.          Current Assessment & Plan     Update labs. Trial of meclizine. Zofran PRN for nausea. There is bilateral ceruminosis on exam; no impaction. Trial of Debrox gtts. Discussed condition course and signs and symptoms to expect. Follow up with ENT if no acute findings on labs. ER precautions.  Call MD or follow-up " to clinic if not improving or worsening symptoms.          Relevant Medications    meclizine (ANTIVERT) 25 mg tablet    Other Relevant Orders    CBC Auto Differential (Completed)    Comprehensive Metabolic Panel (Completed)    Hemoglobin A1C (Completed)    TSH (Completed)    Ambulatory referral/consult to ENT     Other Visit Diagnoses       Ceruminosis, bilateral        Relevant Medications    carbamide peroxide (DEBROX) 6.5 % otic solution          Follow up if symptoms worsen or fail to improve.  ER precautions for severe or worsening symptoms.     Violeta Nicole, MARCELL  _____________________________________________________________________________________________________________________________________________________    CC: dizziness     HPI: Patient is a 72-year-old female who presents in clinic today accompanied by her daughter as an established patient here for dizziness. The dizziness has been present for several months. Occurs intermittently. The patient describes the symptoms as disequalibirum, vertigo, and lightheadedness. Symptoms are exacerbated by rapid head movements and changing positions. Patient denies aural pressure, otalgia, otorrhea, tinnitus, hearing loss.  She has tried nothing for the symptoms. She has taken meclizine in the past. She does not see ENT.     Past Medical History:  Past Medical History:   Diagnosis Date    Acquired hypothyroidism 10/6/2021    Anemia 9/18/2023    Angina pectoris     Anticoagulant long-term use     CAD (coronary artery disease)     with stents x 3    GERD (gastroesophageal reflux disease)      Past Surgical History:   Procedure Laterality Date    2006 2 stents      2009 1 stent      CHOLECYSTECTOMY  2021    Dr. Roach    ESOPHAGOGASTRODUODENOSCOPY N/A 11/28/2018    Procedure: EGD (ESOPHAGOGASTRODUODENOSCOPY);  Surgeon: Marvin Alvarado MD;  [facility];  Service: Endoscopy;  Laterality: N/A;    WRIST FRACTURE SURGERY  03/2018     Review of patient's  allergies indicates:   Allergen Reactions    Sulfa (sulfonamide antibiotics) Shortness Of Breath     Social History     Tobacco Use    Smoking status: Never    Smokeless tobacco: Never   Substance Use Topics    Alcohol use: No    Drug use: No     Family History   Problem Relation Age of Onset    Heart disease Father      Current Outpatient Medications on File Prior to Visit   Medication Sig Dispense Refill    ascorbic acid, vitamin C, (VITAMIN C) 500 MG tablet Take 500 mg by mouth once daily.      aspirin (ECOTRIN) 81 MG EC tablet Take 81 mg by mouth once daily.      atorvastatin (LIPITOR) 40 MG tablet TAKE 1 TABLET EVERY DAY 90 tablet 2    benzonatate (TESSALON) 100 MG capsule Take 1 capsule (100 mg total) by mouth 3 (three) times daily as needed for Cough. 30 capsule 0    calcium-vitamin D3 (OS-SHEILA 500 + D3) 500 mg(1,250mg) -200 unit per tablet Take 1 tablet by mouth 2 (two) times daily with meals.      diclofenac (VOLTAREN) 75 MG EC tablet Take 75 mg by mouth once daily.      famotidine (PEPCID) 20 MG tablet TAKE 1 TABLET TWICE DAILY AS NEEDED FOR HEARTBURN. 180 tablet 2    fluticasone propionate (FLONASE) 50 mcg/actuation nasal spray SHAKE LIQUID AND USE 1 SPRAY(50 MCG) IN EACH NOSTRIL EVERY DAY 48 g 0    gabapentin (NEURONTIN) 300 MG capsule TAKE 3 CAPSULES EVERY EVENING 90 capsule 12    levothyroxine (SYNTHROID) 25 MCG tablet TAKE 1 TABLET EVERY DAY. REPEAT TSH IN 2 MONTHS 90 tablet 2    montelukast (SINGULAIR) 10 mg tablet TAKE 1 TABLET EVERY DAY 90 tablet 2    multivitamin (THERAGRAN) per tablet Take 1 tablet by mouth once daily.      nitroGLYCERIN (NITROSTAT) 0.4 MG SL tablet Place 0.4 mg under the tongue.      pantoprazole (PROTONIX) 40 MG tablet TAKE 1 TABLET TWICE DAILY 180 tablet 0    traMADoL (ULTRAM) 50 mg tablet        No current facility-administered medications on file prior to visit.     Review of Systems   Constitutional:  Negative for appetite change, chills, fatigue and fever.   HENT:  Negative  "for congestion, rhinorrhea and sore throat.    Eyes:  Negative for visual disturbance.   Respiratory:  Negative for cough and shortness of breath.    Cardiovascular:  Negative for chest pain, palpitations and leg swelling.   Gastrointestinal:  Negative for abdominal pain, diarrhea and vomiting.   Genitourinary:  Negative for difficulty urinating, dysuria and hematuria.   Musculoskeletal:  Negative for arthralgias and myalgias.   Skin:  Negative for rash and wound.   Neurological:  Positive for dizziness and light-headedness. Negative for headaches.   Psychiatric/Behavioral:  Negative for behavioral problems. The patient is not nervous/anxious.      Vitals:    09/18/23 1337   BP: 139/83   Pulse: 74   SpO2: 99%   Weight: 80.6 kg (177 lb 9.6 oz)   Height: 5' 2" (1.575 m)     Wt Readings from Last 3 Encounters:   09/18/23 80.6 kg (177 lb 9.6 oz)   10/24/22 82.7 kg (182 lb 5.1 oz)   10/07/22 81.6 kg (180 lb)     Physical Exam  Vitals reviewed.   Constitutional:       General: She is not in acute distress.     Appearance: Normal appearance. She is not ill-appearing.      Comments: Daughter present   HENT:      Head: Normocephalic and atraumatic.      Right Ear: External ear normal. Tympanic membrane is not erythematous.      Left Ear: External ear normal. Tympanic membrane is not erythematous.      Ears:      Comments: Bilateral ceruminosis; no impaction      Nose: Nose normal.   Eyes:      Extraocular Movements: Extraocular movements intact.      Conjunctiva/sclera: Conjunctivae normal.   Cardiovascular:      Rate and Rhythm: Normal rate.      Heart sounds: Normal heart sounds.   Pulmonary:      Effort: Pulmonary effort is normal. No respiratory distress.      Breath sounds: Normal breath sounds.   Abdominal:      General: Abdomen is flat. There is no distension.   Musculoskeletal:         General: Normal range of motion.      Cervical back: Normal range of motion.   Skin:     General: Skin is warm and dry.      " Capillary Refill: Capillary refill takes less than 2 seconds.      Coloration: Skin is not pale.      Findings: No rash.   Neurological:      General: No focal deficit present.      Mental Status: She is alert and oriented to person, place, and time. Mental status is at baseline.      GCS: GCS eye subscore is 4. GCS verbal subscore is 5. GCS motor subscore is 6.      Cranial Nerves: No cranial nerve deficit, dysarthria or facial asymmetry.      Sensory: Sensation is intact.      Motor: Motor function is intact. No weakness, tremor or seizure activity.      Coordination: Coordination is intact.      Gait: Gait is intact.   Psychiatric:         Attention and Perception: Attention normal.         Mood and Affect: Mood normal.         Speech: Speech normal. Speech is not rapid and pressured, delayed or slurred.         Behavior: Behavior normal. Behavior is not agitated, slowed, aggressive, withdrawn, hyperactive or combative. Behavior is cooperative.         Thought Content: Thought content normal.         Judgment: Judgment normal.       Health Maintenance   Topic Date Due    TETANUS VACCINE  Never done    Aspirin/Antiplatelet Therapy  10/07/2023    High Dose Statin  03/16/2024    Mammogram  05/23/2024    Lipid Panel  09/18/2024    Colorectal Cancer Screening  08/14/2025    DEXA Scan  11/14/2025    Hepatitis C Screening  Completed    Shingles Vaccine  Completed

## 2023-09-18 NOTE — ASSESSMENT & PLAN NOTE
Update labs. Counseled on hyperlipidemia disease course, healthy diet and increased need for exercise. Please be advised of the risk of cardiovascular disease, increase stroke and heart attack risk with uncontrolled/untreated hyperlipidemia. Patient voiced understanding and understood the treatment plan. All questions were answered.

## 2023-09-18 NOTE — ASSESSMENT & PLAN NOTE
Update labs. Trial of meclizine. Zofran PRN for nausea. There is bilateral ceruminosis on exam; no impaction. Trial of Debrox gtts. Discussed condition course and signs and symptoms to expect. Follow up with ENT if no acute findings on labs. ER precautions.  Call MD or follow-up to clinic if not improving or worsening symptoms.

## 2023-09-18 NOTE — ASSESSMENT & PLAN NOTE
Update labs. Please be advised of hypothyroidism disease course.  We will plan to monitor thyroid labs at routine intervals.  Please be advised of risks and benefits of medication use, see medication insert for complete details.  ER precautions.

## 2023-09-19 ENCOUNTER — OFFICE VISIT (OUTPATIENT)
Dept: HOME HEALTH SERVICES | Facility: CLINIC | Age: 73
End: 2023-09-19
Payer: MEDICARE

## 2023-09-19 DIAGNOSIS — R11.0 NAUSEA: ICD-10-CM

## 2023-09-19 DIAGNOSIS — E03.9 ACQUIRED HYPOTHYROIDISM: Chronic | ICD-10-CM

## 2023-09-19 DIAGNOSIS — M85.80 OSTEOPENIA, UNSPECIFIED LOCATION: ICD-10-CM

## 2023-09-19 DIAGNOSIS — Z00.00 ENCOUNTER FOR PREVENTIVE HEALTH EXAMINATION: ICD-10-CM

## 2023-09-19 DIAGNOSIS — R42 DIZZINESS: ICD-10-CM

## 2023-09-19 DIAGNOSIS — M35.3 POLYMYALGIA RHEUMATICA: ICD-10-CM

## 2023-09-19 DIAGNOSIS — I70.0 AORTIC ATHEROSCLEROSIS: ICD-10-CM

## 2023-09-19 DIAGNOSIS — K21.9 GASTROESOPHAGEAL REFLUX DISEASE, UNSPECIFIED WHETHER ESOPHAGITIS PRESENT: ICD-10-CM

## 2023-09-19 DIAGNOSIS — Z00.00 ENCOUNTER FOR MEDICARE ANNUAL WELLNESS EXAM: Primary | ICD-10-CM

## 2023-09-19 DIAGNOSIS — E78.5 DYSLIPIDEMIA: ICD-10-CM

## 2023-09-19 LAB
ESTIMATED AVG GLUCOSE: 105 MG/DL (ref 68–131)
HBA1C MFR BLD: 5.3 % (ref 4–5.6)

## 2023-09-19 PROCEDURE — 3288F FALL RISK ASSESSMENT DOCD: CPT | Mod: CPTII,S$GLB,, | Performed by: NURSE PRACTITIONER

## 2023-09-19 PROCEDURE — 1126F PR PAIN SEVERITY QUANTIFIED, NO PAIN PRESENT: ICD-10-PCS | Mod: CPTII,S$GLB,, | Performed by: NURSE PRACTITIONER

## 2023-09-19 PROCEDURE — 3044F PR MOST RECENT HEMOGLOBIN A1C LEVEL <7.0%: ICD-10-PCS | Mod: CPTII,S$GLB,, | Performed by: NURSE PRACTITIONER

## 2023-09-19 PROCEDURE — 1160F RVW MEDS BY RX/DR IN RCRD: CPT | Mod: CPTII,S$GLB,, | Performed by: NURSE PRACTITIONER

## 2023-09-19 PROCEDURE — 1101F PR PT FALLS ASSESS DOC 0-1 FALLS W/OUT INJ PAST YR: ICD-10-PCS | Mod: CPTII,S$GLB,, | Performed by: NURSE PRACTITIONER

## 2023-09-19 PROCEDURE — 1126F AMNT PAIN NOTED NONE PRSNT: CPT | Mod: CPTII,S$GLB,, | Performed by: NURSE PRACTITIONER

## 2023-09-19 PROCEDURE — 1170F PR FUNCTIONAL STATUS ASSESSED: ICD-10-PCS | Mod: CPTII,S$GLB,, | Performed by: NURSE PRACTITIONER

## 2023-09-19 PROCEDURE — 3008F PR BODY MASS INDEX (BMI) DOCUMENTED: ICD-10-PCS | Mod: CPTII,S$GLB,, | Performed by: NURSE PRACTITIONER

## 2023-09-19 PROCEDURE — 3075F SYST BP GE 130 - 139MM HG: CPT | Mod: CPTII,S$GLB,, | Performed by: NURSE PRACTITIONER

## 2023-09-19 PROCEDURE — G0439 PPPS, SUBSEQ VISIT: HCPCS | Mod: S$GLB,,, | Performed by: NURSE PRACTITIONER

## 2023-09-19 PROCEDURE — 3044F HG A1C LEVEL LT 7.0%: CPT | Mod: CPTII,S$GLB,, | Performed by: NURSE PRACTITIONER

## 2023-09-19 PROCEDURE — 3075F PR MOST RECENT SYSTOLIC BLOOD PRESS GE 130-139MM HG: ICD-10-PCS | Mod: CPTII,S$GLB,, | Performed by: NURSE PRACTITIONER

## 2023-09-19 PROCEDURE — 1170F FXNL STATUS ASSESSED: CPT | Mod: CPTII,S$GLB,, | Performed by: NURSE PRACTITIONER

## 2023-09-19 PROCEDURE — 1159F PR MEDICATION LIST DOCUMENTED IN MEDICAL RECORD: ICD-10-PCS | Mod: CPTII,S$GLB,, | Performed by: NURSE PRACTITIONER

## 2023-09-19 PROCEDURE — 3078F PR MOST RECENT DIASTOLIC BLOOD PRESSURE < 80 MM HG: ICD-10-PCS | Mod: CPTII,S$GLB,, | Performed by: NURSE PRACTITIONER

## 2023-09-19 PROCEDURE — 3008F BODY MASS INDEX DOCD: CPT | Mod: CPTII,S$GLB,, | Performed by: NURSE PRACTITIONER

## 2023-09-19 PROCEDURE — G0439 PR MEDICARE ANNUAL WELLNESS SUBSEQUENT VISIT: ICD-10-PCS | Mod: S$GLB,,, | Performed by: NURSE PRACTITIONER

## 2023-09-19 PROCEDURE — 3288F PR FALLS RISK ASSESSMENT DOCUMENTED: ICD-10-PCS | Mod: CPTII,S$GLB,, | Performed by: NURSE PRACTITIONER

## 2023-09-19 PROCEDURE — 1160F PR REVIEW ALL MEDS BY PRESCRIBER/CLIN PHARMACIST DOCUMENTED: ICD-10-PCS | Mod: CPTII,S$GLB,, | Performed by: NURSE PRACTITIONER

## 2023-09-19 PROCEDURE — 3078F DIAST BP <80 MM HG: CPT | Mod: CPTII,S$GLB,, | Performed by: NURSE PRACTITIONER

## 2023-09-19 PROCEDURE — 1159F MED LIST DOCD IN RCRD: CPT | Mod: CPTII,S$GLB,, | Performed by: NURSE PRACTITIONER

## 2023-09-19 PROCEDURE — 1101F PT FALLS ASSESS-DOCD LE1/YR: CPT | Mod: CPTII,S$GLB,, | Performed by: NURSE PRACTITIONER

## 2023-09-20 ENCOUNTER — TELEPHONE (OUTPATIENT)
Dept: FAMILY MEDICINE | Facility: CLINIC | Age: 73
End: 2023-09-20
Payer: MEDICARE

## 2023-09-20 VITALS
DIASTOLIC BLOOD PRESSURE: 71 MMHG | BODY MASS INDEX: 32.37 KG/M2 | WEIGHT: 177 LBS | SYSTOLIC BLOOD PRESSURE: 133 MMHG | OXYGEN SATURATION: 96 % | HEART RATE: 67 BPM

## 2023-09-20 PROBLEM — I70.0 AORTIC ATHEROSCLEROSIS: Status: ACTIVE | Noted: 2023-09-20

## 2023-09-20 PROBLEM — I70.0 AORTIC ATHEROSCLEROSIS: Status: ACTIVE | Noted: 2021-10-04

## 2023-09-20 NOTE — TELEPHONE ENCOUNTER
----- Message from Ivy Nguyen sent at 9/20/2023  3:04 PM CDT -----  Contact: [phone]  Type:  Patient Returning Call    Who Called:Flores   Who Left Message for Patient:nurse   Does the patient know what this is regarding?:results   Would the patient rather a call back or a response via [facility]? Call back   Best Call Back Number:[phone]   Additional Information:

## 2023-09-20 NOTE — PATIENT INSTRUCTIONS
Counseling and Referral of Other Preventative  (Italic type indicates deductible and co-insurance are waived)    Patient Name: Flores Adamson  Today's Date: 9/20/2023    Health Maintenance       Date Due Completion Date    TETANUS VACCINE Never done ---    COVID-19 Vaccine (5 - Pfizer risk series) 11/30/2022 10/5/2022    Influenza Vaccine (1) 09/01/2023 10/5/2022    Aspirin/Antiplatelet Therapy 10/07/2023 10/7/2022    High Dose Statin 03/16/2024 3/16/2023    Mammogram 05/23/2024 5/23/2023    Override on 8/10/2016: Done ([place].  BI-RADS 1 - Negative.)    Lipid Panel 09/18/2024 9/18/2023    Colorectal Cancer Screening 08/14/2025 8/14/2015    DEXA Scan 11/14/2025 11/14/2022        No orders of the defined types were placed in this encounter.    The following information is provided to all patients.  This information is to help you find resources for any of the problems found today that may be affecting your health:                Living healthy guide: www.[facility].[place].gov      Understanding Diabetes: www.diabetes.org      Eating healthy: www.cdc.gov/healthyweight      CDC home safety checklist: www.cdc.gov/steadi/patient.html      Agency on Aging: www.goea.[place].[facility]      Alcoholics anonymous (AA): www.aa.org      Physical Activity: www.kaye.nih.gov/bh6uwjv      Tobacco use: www.quitwithusla.org

## 2023-09-29 ENCOUNTER — PATIENT MESSAGE (OUTPATIENT)
Dept: FAMILY MEDICINE | Facility: CLINIC | Age: 73
End: 2023-09-29
Payer: MEDICARE

## 2023-10-03 ENCOUNTER — OFFICE VISIT (OUTPATIENT)
Dept: FAMILY MEDICINE | Facility: CLINIC | Age: 73
End: 2023-10-03
Payer: MEDICARE

## 2023-10-03 VITALS
BODY MASS INDEX: 34.6 KG/M2 | HEIGHT: 62 IN | WEIGHT: 188 LBS | SYSTOLIC BLOOD PRESSURE: 130 MMHG | DIASTOLIC BLOOD PRESSURE: 60 MMHG | HEART RATE: 90 BPM | OXYGEN SATURATION: 97 %

## 2023-10-03 DIAGNOSIS — Z00.00 WELL ADULT EXAM: Primary | ICD-10-CM

## 2023-10-03 DIAGNOSIS — Z13.6 ENCOUNTER FOR LIPID SCREENING FOR CARDIOVASCULAR DISEASE: ICD-10-CM

## 2023-10-03 DIAGNOSIS — E78.5 DYSLIPIDEMIA: ICD-10-CM

## 2023-10-03 DIAGNOSIS — Z13.220 ENCOUNTER FOR LIPID SCREENING FOR CARDIOVASCULAR DISEASE: ICD-10-CM

## 2023-10-03 DIAGNOSIS — E03.9 ACQUIRED HYPOTHYROIDISM: ICD-10-CM

## 2023-10-03 DIAGNOSIS — Z79.899 ENCOUNTER FOR LONG-TERM (CURRENT) USE OF MEDICATIONS: ICD-10-CM

## 2023-10-03 DIAGNOSIS — R42 DIZZINESS: ICD-10-CM

## 2023-10-03 PROBLEM — M65.311 TRIGGER FINGER OF RIGHT THUMB: Status: ACTIVE | Noted: 2023-01-16

## 2023-10-03 PROCEDURE — 1159F MED LIST DOCD IN RCRD: CPT | Mod: HCNC,CPTII,S$GLB, | Performed by: FAMILY MEDICINE

## 2023-10-03 PROCEDURE — 1160F RVW MEDS BY RX/DR IN RCRD: CPT | Mod: HCNC,CPTII,S$GLB, | Performed by: FAMILY MEDICINE

## 2023-10-03 PROCEDURE — 3078F DIAST BP <80 MM HG: CPT | Mod: HCNC,CPTII,S$GLB, | Performed by: FAMILY MEDICINE

## 2023-10-03 PROCEDURE — 3078F PR MOST RECENT DIASTOLIC BLOOD PRESSURE < 80 MM HG: ICD-10-PCS | Mod: HCNC,CPTII,S$GLB, | Performed by: FAMILY MEDICINE

## 2023-10-03 PROCEDURE — 99999 PR PBB SHADOW E&M-EST. PATIENT-LVL V: CPT | Mod: PBBFAC,HCNC,, | Performed by: FAMILY MEDICINE

## 2023-10-03 PROCEDURE — 99397 PER PM REEVAL EST PAT 65+ YR: CPT | Mod: HCNC,S$GLB,, | Performed by: FAMILY MEDICINE

## 2023-10-03 PROCEDURE — 3008F BODY MASS INDEX DOCD: CPT | Mod: HCNC,CPTII,S$GLB, | Performed by: FAMILY MEDICINE

## 2023-10-03 PROCEDURE — 1160F PR REVIEW ALL MEDS BY PRESCRIBER/CLIN PHARMACIST DOCUMENTED: ICD-10-PCS | Mod: HCNC,CPTII,S$GLB, | Performed by: FAMILY MEDICINE

## 2023-10-03 PROCEDURE — 99999 PR PBB SHADOW E&M-EST. PATIENT-LVL V: ICD-10-PCS | Mod: PBBFAC,HCNC,, | Performed by: FAMILY MEDICINE

## 2023-10-03 PROCEDURE — 3075F PR MOST RECENT SYSTOLIC BLOOD PRESS GE 130-139MM HG: ICD-10-PCS | Mod: HCNC,CPTII,S$GLB, | Performed by: FAMILY MEDICINE

## 2023-10-03 PROCEDURE — 1159F PR MEDICATION LIST DOCUMENTED IN MEDICAL RECORD: ICD-10-PCS | Mod: HCNC,CPTII,S$GLB, | Performed by: FAMILY MEDICINE

## 2023-10-03 PROCEDURE — 1126F AMNT PAIN NOTED NONE PRSNT: CPT | Mod: HCNC,CPTII,S$GLB, | Performed by: FAMILY MEDICINE

## 2023-10-03 PROCEDURE — 3075F SYST BP GE 130 - 139MM HG: CPT | Mod: HCNC,CPTII,S$GLB, | Performed by: FAMILY MEDICINE

## 2023-10-03 PROCEDURE — 3288F PR FALLS RISK ASSESSMENT DOCUMENTED: ICD-10-PCS | Mod: HCNC,CPTII,S$GLB, | Performed by: FAMILY MEDICINE

## 2023-10-03 PROCEDURE — 3044F PR MOST RECENT HEMOGLOBIN A1C LEVEL <7.0%: ICD-10-PCS | Mod: HCNC,CPTII,S$GLB, | Performed by: FAMILY MEDICINE

## 2023-10-03 PROCEDURE — 1126F PR PAIN SEVERITY QUANTIFIED, NO PAIN PRESENT: ICD-10-PCS | Mod: HCNC,CPTII,S$GLB, | Performed by: FAMILY MEDICINE

## 2023-10-03 PROCEDURE — 1101F PR PT FALLS ASSESS DOC 0-1 FALLS W/OUT INJ PAST YR: ICD-10-PCS | Mod: HCNC,CPTII,S$GLB, | Performed by: FAMILY MEDICINE

## 2023-10-03 PROCEDURE — 1101F PT FALLS ASSESS-DOCD LE1/YR: CPT | Mod: HCNC,CPTII,S$GLB, | Performed by: FAMILY MEDICINE

## 2023-10-03 PROCEDURE — 3044F HG A1C LEVEL LT 7.0%: CPT | Mod: HCNC,CPTII,S$GLB, | Performed by: FAMILY MEDICINE

## 2023-10-03 PROCEDURE — 3288F FALL RISK ASSESSMENT DOCD: CPT | Mod: HCNC,CPTII,S$GLB, | Performed by: FAMILY MEDICINE

## 2023-10-03 PROCEDURE — 99397 PR PREVENTIVE VISIT,EST,65 & OVER: ICD-10-PCS | Mod: HCNC,S$GLB,, | Performed by: FAMILY MEDICINE

## 2023-10-03 PROCEDURE — 3008F PR BODY MASS INDEX (BMI) DOCUMENTED: ICD-10-PCS | Mod: HCNC,CPTII,S$GLB, | Performed by: FAMILY MEDICINE

## 2023-10-03 NOTE — PATIENT INSTRUCTIONS
Follow up in about 6 months (around 4/3/2024), or if symptoms worsen or fail to improve, for Med refills, LAB RESULTS.     Dear patient,   As a result of recent federal legislation (The Federal Cures Act), you may receive lab or pathology results from your visit in your [facility] account before your physician is able to contact you. Your physician or their representative will relay the results to you with their recommendations at their soonest availability.     If no improvement in symptoms or symptoms worsen, please be advised to call MD, follow-up at clinic and/or go to ER if becomes severe.    gAustín Carter M.D.        We Offer TELEHEALTH & Same Day Appointments!   Book your Telehealth appointment with me through my nurse or   Clinic appointments on [employer]!    [address] [place] [address]    Office: [phone]   FAX: [phone]    Check out my Facebook Page and Follow Me at: [email or web address]    Check out my website at [employer] by clicking on: [email or web address]    To Schedule appointments online, go to [employer]: [email or web address]

## 2023-10-03 NOTE — PROGRESS NOTES
This note is specifically for wellness visit performed today.     WELLNESS EXAM      Patient ID: Flores Adamson is a 72 y.o. female with  has a past medical history of Acquired hypothyroidism (10/6/2021), Anemia (9/18/2023), Angina pectoris, Anticoagulant long-term use, CAD (coronary artery disease), and GERD (gastroesophageal reflux disease).     Chief Complaint:  Encounter for wellness exam    Well Adult Physical: Patient here for a comprehensive physical exam.The patient reports chronic conditions.  The patient's last visit with me was on 10/7/2022.    October 2023: Patient presents with her daughter today.  Patient reports that she is still having dizziness.  She has wax buildup in her right ear that she would like to have removed.  She has follow-up with ENT scheduled in [place].    Julito Stallworth MD   ENT Torito Rodriguez PA-C    Reviewed Care team:  Previous PCP: Dr. Jr Badillo   GYN:  Dr. Raza   Cardiology Dr. Hoang   GI: Dr. Alvarado   [facility]   Gen Surg Dr. Roach    Hyperlipidemia:  Hyperlipidemia Medications               atorvastatin (LIPITOR) 40 MG tablet TAKE 1 TABLET EVERY DAY            CHRONIC. STABLE. Lab analysis reviewed.   (-) CP, SOB, abdominal pain, N/V/D, constipation, jaundice, skin changes.  (-) Myalgias  Lab Results   Component Value Date    CHOL 136 09/18/2023    CHOL 119 (L) 09/29/2022    CHOL 127 09/09/2021     Lab Results   Component Value Date    HDL 63 09/18/2023    HDL 49 09/29/2022    HDL 54 09/09/2021     Lab Results   Component Value Date    LDLCALC 60.6 (L) 09/18/2023    LDLCALC 51.2 (L) 09/29/2022    LDLCALC 58.4 (L) 09/09/2021     Lab Results   Component Value Date    TRIG 62 09/18/2023    TRIG 94 09/29/2022    TRIG 73 09/09/2021     Lab Results   Component Value Date    CHOLHDL 46.3 09/18/2023    CHOLHDL 41.2 09/29/2022    CHOLHDL 42.5 09/09/2021     Lab Results   Component Value Date    TOTALCHOLEST 2.2 09/18/2023    TOTALCHOLEST 2.4  09/29/2022    TOTALCHOLEST 2.4 09/09/2021     Lab Results   Component Value Date    ALT 29 09/18/2023    AST 28 09/18/2023    ALKPHOS 101 09/18/2023    BILITOT 0.7 09/18/2023     ======================================================  The 10-year ASCVD risk score (Celina ANDINO, et al., 2019) is: 14.1%    Values used to calculate the score:      Age: 72 years      Sex: Female      Is Non- : No      Diabetic: No      Tobacco smoker: No      Systolic Blood Pressure: 130 mmHg      Is BP treated: No      HDL Cholesterol: 63 mg/dL      Total Cholesterol: 136 mg/dL    Hypothyroidism:  Chronic.  Stable.  On levothyroxine 25 micrograms daily.  Reports compliance.  No side effects reported.  Patient had previous CT of the neck in 2021 that had normal thyroid.  The report was reviewed with the patient.  He has never had ultrasound of her thyroid.  Patient reports change in voice.    Lab Results   Component Value Date    TSH 2.132 09/18/2023    FREET4 0.87 10/05/2020     GERD:  Chronic.  Stable.  On Protonix 40 milligrams daily.  Reports compliance.  No side effects reported.  PMR:  Following with Rheumatology.  On anti-inflammatory.    Do you take any herbs or supplements that were not prescribed by a doctor? no   Are you taking calcium supplements?  Yes  Are you taking aspirin daily?  Yes  Lab Results   Component Value Date    IRON 132 09/18/2023    TRANSFERRIN 242 09/18/2023    TIBC 358 09/18/2023    FESATURATED 37 09/18/2023      Lab Results   Component Value Date    DXOHWALF16 >2000 (H) 09/18/2023     Lab Results   Component Value Date    FOLATE 15.9 09/18/2023       LMP: No LMP recorded. Patient is postmenopausal.   MMG:    Narrative    REASON FOR EXAM: [Z12.31]-Encounter for screening mammogram for malignant   neoplasm of breast     TECHNICAL FACTORS: Digital mammography with tomosynthesis was performed of   the breasts in the mediolateral oblique and craniocaudal views. CAD was   utilized. Cleavage  view was also obtained.     CLINICAL INFORMATION: This is a female patient for screening mammogram.   According to the National Cancer [place] Model risk assessment   tool, her lifetime breast cancer risk is 3% .     COMPARISON: May 19, 2022 and May 3, 2021     FINDINGS: There are scattered areas of fibroglandular density. There is no   evidence of suspicious mass, calcifications or architectural distortion.   Benign calcifications are present in the breasts.     IMPRESSION:   BI-RADS 2 - Benign     No mammographic findings of malignancy are identified. Annual mammography   is recommended.     BREAST DENSITY: Scattered Fibroglandular     The patient has been entered into our radiology information system, and   she will receive notification approximately 30 days prior to the due date   of her next annual screening mammogram.     Electronically signed by Bimal Gimenez MD on 5/23/2023 10:19 PM  Exam End: 05/22/23 14:00     05/20/2022 6:25 AM CDT    REASON FOR EXAM: [Z12.31]-Encounter for screening mammogram for malignant neoplasm of breast     TECHNICAL FACTORS: Digital mammography with tomosynthesis was performed of the breasts in the mediolateral oblique and craniocaudal views. CAD was utilized.    CLINICAL INFORMATION: This is a female patient for screening mammogram. According to the National Cancer [place] Model risk assessment tool, her lifetime breast cancer risk is 3.1% .    COMPARISON: 05/03/2021, 10/14/2019     FINDINGS: There are scattered areas of fibroglandular density. There is no evidence of suspicious mass, calcifications or architectural distortion. There is no adverse interval change observed.     IMPRESSION:  BI-RADS 1 - Negative.     No mammographic findings of malignancy are identified. Annual mammography is recommended.    BREAST DENSITY: Scattered Fibroglandular    The patient has been entered into our radiology information system, and she will receive notification approximately  30 days prior to the due date of her next annual screening mammogram.    Electronically signed by Osman Jackson MD on 5/20/2022 6:25 AM    PAP:  Not indicated  Colonoscopy:    Findings:        The colon (entire examined portion) appeared normal, including        terminal ileum.   Impression:          - The entire examined colon is normal.                        - No specimens collected.   Recommendation:      - Repeat colonoscopy in 10 years for screening                        purposes, or PRN.                        - Discharge patient to home (ambulatory).   Marvin Alvarado MD   8/3/2015 11:37:49 AM   This report has been verified and signed electronically.   Number of Addenda: 0   Note Initiated On: 8/3/2015 6:59 AM   Estimated Blood Loss:        Estimated blood loss: none.      Specimen Collected: 08/03/15 06:59        DEXA:  Performed through OBGYN, no records available at this time.    Health Maintenance Topics with due status: Not Due       Topic Last Completion Date    Colorectal Cancer Screening 08/14/2015    DEXA Scan 11/14/2022    Mammogram 05/23/2023    Lipid Panel 09/18/2023    High Dose Statin 10/03/2023    Aspirin/Antiplatelet Therapy 10/03/2023        ==============================================  History reviewed.   Health Maintenance Due   Topic Date Due    TETANUS VACCINE  Never done    COVID-19 Vaccine (5 - Pfizer risk series) 11/30/2022    Influenza Vaccine (1) 09/01/2023       Past Medical History:  Past Medical History:   Diagnosis Date    Acquired hypothyroidism 10/6/2021    Anemia 9/18/2023    Angina pectoris     Anticoagulant long-term use     CAD (coronary artery disease)     with stents x 3    GERD (gastroesophageal reflux disease)      Past Surgical History:   Procedure Laterality Date    2006 2 stents      2009 1 stent      CHOLECYSTECTOMY  2021    Dr. Roach    ESOPHAGOGASTRODUODENOSCOPY N/A 11/28/2018    Procedure: EGD (ESOPHAGOGASTRODUODENOSCOPY);  Surgeon: Marvin Alvarado,  MD;  Location: [facility];  Service: Endoscopy;  Laterality: N/A;    WRIST FRACTURE SURGERY  03/2018     Review of patient's allergies indicates:   Allergen Reactions    Sulfa (sulfonamide antibiotics) Shortness Of Breath     Current Outpatient Medications on File Prior to Visit   Medication Sig Dispense Refill    ascorbic acid, vitamin C, (VITAMIN C) 500 MG tablet Take 500 mg by mouth once daily.      aspirin (ECOTRIN) 81 MG EC tablet Take 81 mg by mouth once daily.      atorvastatin (LIPITOR) 40 MG tablet TAKE 1 TABLET EVERY DAY 90 tablet 2    calcium-vitamin D3 (OS-SHEILA 500 + D3) 500 mg(1,250mg) -200 unit per tablet Take 1 tablet by mouth 2 (two) times daily with meals.      carbamide peroxide (DEBROX) 6.5 % otic solution Place 5 drops into both ears 2 (two) times daily. 15 mL 0    diclofenac (VOLTAREN) 75 MG EC tablet Take 75 mg by mouth once daily.      famotidine (PEPCID) 20 MG tablet TAKE 1 TABLET TWICE DAILY AS NEEDED FOR HEARTBURN. 180 tablet 2    fluticasone propionate (FLONASE) 50 mcg/actuation nasal spray SHAKE LIQUID AND USE 1 SPRAY(50 MCG) IN EACH NOSTRIL EVERY DAY 48 g 0    gabapentin (NEURONTIN) 300 MG capsule TAKE 3 CAPSULES EVERY EVENING 90 capsule 12    levothyroxine (SYNTHROID) 25 MCG tablet TAKE 1 TABLET EVERY DAY. REPEAT TSH IN 2 MONTHS 90 tablet 2    meclizine (ANTIVERT) 25 mg tablet Take 1 tablet (25 mg total) by mouth 3 (three) times daily as needed for Dizziness. 30 tablet 0    montelukast (SINGULAIR) 10 mg tablet TAKE 1 TABLET EVERY DAY 90 tablet 2    multivitamin (THERAGRAN) per tablet Take 1 tablet by mouth once daily.      nitroGLYCERIN (NITROSTAT) 0.4 MG SL tablet Place 0.4 mg under the tongue.      ondansetron (ZOFRAN) 8 MG tablet Take 1 tablet (8 mg total) by mouth every 8 (eight) hours as needed for Nausea. 10 tablet 0    pantoprazole (PROTONIX) 40 MG tablet TAKE 1 TABLET TWICE DAILY 180 tablet 0    traMADoL (ULTRAM) 50 mg tablet       benzonatate (TESSALON) 100 MG capsule Take 1  capsule (100 mg total) by mouth 3 (three) times daily as needed for Cough. (Patient not taking: Reported on 9/20/2023) 30 capsule 0     No current facility-administered medications on file prior to visit.     Social History     Socioeconomic History    Marital status:     Number of children: 3   Tobacco Use    Smoking status: Never    Smokeless tobacco: Never   Substance and Sexual Activity    Alcohol use: No    Drug use: No   Social History Narrative    ** Merged History Encounter **          Social Determinants of Health     Financial Resource Strain: Low Risk  (9/19/2023)    Overall Financial Resource Strain (CARDIA)     Difficulty of Paying Living Expenses: Not hard at all   Food Insecurity: No Food Insecurity (9/19/2023)    Hunger Vital Sign     Worried About Running Out of Food in the Last Year: Never true     Ran Out of Food in the Last Year: Never true   Transportation Needs: No Transportation Needs (9/19/2023)    PRAPARE - Transportation     Lack of Transportation (Medical): No     Lack of Transportation (Non-Medical): No   Physical Activity: Inactive (9/19/2023)    Exercise Vital Sign     Days of Exercise per Week: 0 days     Minutes of Exercise per Session: 0 min   Stress: No Stress Concern Present (9/19/2023)    [employer] - Occupational Stress Questionnaire     Feeling of Stress : Not at all   Social Connections: Moderately Isolated (9/19/2023)    Social Connection and Isolation Panel [NHANES]     Frequency of Communication with Friends and Family: More than three times a week     Frequency of Social Gatherings with Friends and Family: More than three times a week     Attends Restorationism Services: Never     Active Member of Clubs or Organizations: No     Attends Club or Organization Meetings: Never     Marital Status:    Housing Stability: Unknown (9/19/2023)    Housing Stability Vital Sign     Unable to Pay for Housing in the Last Year: No     Unstable Housing  in the Last Year: No     Family History   Problem Relation Age of Onset    Heart disease Father        Review of Systems   Constitutional:  Negative for chills, fatigue, fever and unexpected weight change.   HENT:  Negative for ear pain and sore throat.    Eyes:  Negative for redness and visual disturbance.   Respiratory:  Negative for cough and shortness of breath.    Cardiovascular:  Negative for chest pain and palpitations.   Gastrointestinal:  Negative for nausea and vomiting.   Genitourinary:  Negative for difficulty urinating and hematuria.   Musculoskeletal:  Positive for myalgias and neck pain. Negative for arthralgias.   Skin:  Negative for rash and wound.   Neurological:  Positive for dizziness. Negative for weakness and headaches.   Psychiatric/Behavioral:  Negative for sleep disturbance. The patient is not nervous/anxious.         Objective:    Nursing note and vitals reviewed.  Vitals:    10/03/23 1353   BP: 130/60   Pulse: 90     Body mass index is 34.39 kg/m².     Physical Exam  Vitals and nursing note reviewed.   Constitutional:       General: She is not in acute distress.     Appearance: She is well-developed. She is not ill-appearing, toxic-appearing or diaphoretic.   HENT:      Head: Normocephalic and atraumatic.      Right Ear: Hearing and external ear normal. There is impacted cerumen (Removed by curette).      Left Ear: Hearing and external ear normal.   Eyes:      General: Lids are normal.      Conjunctiva/sclera: Conjunctivae normal.      Pupils: Pupils are equal, round, and reactive to light.   Cardiovascular:      Rate and Rhythm: Normal rate and regular rhythm.      Heart sounds: Normal heart sounds. No murmur heard.  Pulmonary:      Effort: Pulmonary effort is normal. No respiratory distress.      Breath sounds: Normal breath sounds. No wheezing.   Abdominal:      General: Bowel sounds are normal.      Palpations: Abdomen is soft.   Musculoskeletal:         General: Normal range of  motion.      Cervical back: Normal range of motion and neck supple. No swelling, deformity, lacerations, spasms, tenderness or bony tenderness.   Skin:     General: Skin is warm and dry.      Capillary Refill: Capillary refill takes less than 2 seconds.      Coloration: Skin is not pale.   Neurological:      General: No focal deficit present.      Mental Status: She is alert and oriented to person, place, and time. Mental status is at baseline. She is not disoriented.      Cranial Nerves: No cranial nerve deficit.      Motor: No weakness.      Gait: Gait normal.   Psychiatric:         Attention and Perception: She is attentive.         Mood and Affect: Mood is not anxious or depressed.         Speech: Speech is not rapid and pressured or slurred.         Behavior: Behavior normal. Behavior is not agitated, aggressive or hyperactive. Behavior is cooperative.         Thought Content: Thought content normal. Thought content is not paranoid or delusional. Thought content does not include homicidal or suicidal ideation. Thought content does not include homicidal or suicidal plan.         Cognition and Memory: Memory is not impaired.         Judgment: Judgment normal.       Lab Results   Component Value Date    WBC 6.71 09/18/2023    HGB 12.9 09/18/2023    HCT 40.8 09/18/2023    MCV 97 09/18/2023     09/18/2023         Chemistry        Component Value Date/Time     09/18/2023 1418    K 4.6 09/18/2023 1418     09/18/2023 1418    CO2 30 (H) 09/18/2023 1418    BUN 12 09/18/2023 1418    CREATININE 0.9 09/18/2023 1418     09/18/2023 1418        Component Value Date/Time    CALCIUM 9.5 09/18/2023 1418    ALKPHOS 101 09/18/2023 1418    AST 28 09/18/2023 1418    ALT 29 09/18/2023 1418    BILITOT 0.7 09/18/2023 1418    ESTGFRAFRICA >60.0 07/22/2022 1422    EGFRNONAA >60.0 07/22/2022 1422          Lab Results   Component Value Date    TSH 2.132 09/18/2023    FREET4 0.87 10/05/2020                  Assessment / Plan:      1.  ANNUAL WELLNESS EXAM -patient here for annual wellness exam.  Labs ordered.  Health maintenance was reviewed and ordered.  Medications were reviewed and reconciled.    Hypothyroidism: Continue levothyroxine 25 micrograms daily.  Check thyroid ultrasound.  Please be advised of hypothyroidism disease course.  We will plan to monitor thyroid labs at routine intervals.  Please be advised of risks and benefits of medication use, see medication insert for complete details.  ER precautions.  GERD RECOMMENDATIONS  Please be advised of condition course.  - Take PPI in the morning 30-60 minutes before breakfast  - I recommend ongoing Education for lifestyle modifications to help control/reduce reflux/abdominal pain including: avoid large meals, avoid eating within 2-3 hours of bedtime (avoid late night eating & lying down soon after eating), elevate head of bed if nocturnal symptoms are present, smoking cessation (if current smoker), & weight loss (if overweight).   - please be advised to avoid known foods which trigger reflux symptoms & to minimize/avoid high-fat foods, chocolate, caffeine, citrus, alcohol, & tomato products.  - Advised to avoid/limit use of NSAID's, since they can cause GI upset, bleeding, and/or ulcers. If needed, take with food.   Complete history and physical was completed today.  Complete and thorough medication reconciliation was performed.  Discussed risks and benefits of medications.  Advised patient on orders and health maintenance.  We discussed old records and old labs if available.  Will request any records not available through epic.  Continue current medications listed on your summary sheet.  PMR:  Follow-up with Rheumatology.  Continue diclofenac.  Continue gabapentin.  Patient is getting tramadol from Rheumatology.  All questions were answered. Patient had no further concerns. Advised of Wellness plan. Follow up in 1 year for ANNUAL WELLNESS EXAM  .Counseled on  hyperlipidemia disease course, healthy diet and increased need for exercise.  Please be advised of the risk of cardiovascular disease, increase stroke and heart attack risk with uncontrolled/untreated hyperlipidemia.     Patient voiced understanding and understood the treatment plan. All questions were answered.     Previous plan:  Mild hypothyroidism:  Patient is interested in coming off of the medication.  She will recheck thyroid labs.  If within normal limits patient can have a trial off of medications since it is that such at low dose to see if she has any symptoms.    Please be advised of hypothyroidism disease course.  We will plan to monitor thyroid labs at routine intervals.  Please be advised of risks and benefits of medication use, see medication insert for complete details.  ER precautions.  Common complaints from hypothyroidism include weight gain, fatigue, cold intolerance, constipation, dry and flaky shin, coarse or loss of hair, and trouble with memory.     Complaints with hyperthyroidism are weight loss or decrease in appetite, weakness and fatigue, visual changes, fast heart rate, decreased heat tolerance, thinning scalp hair, change in bowel habits, and palpations.    History of CAD: Continue follow-up with Cardiology.  Continue statin, aspirin good control of blood pressure.  Avoid excessive amounts of anti-inflammatory.  ER precautions.  Future Appointments       Date Provider Specialty Appt Notes    10/9/2023 Torito Rodriguez PA-C Otolaryngology Ear fullness/ Dizziness    10/18/2023 Reji Hoang MD Cardiology ANNUAL F/U             Orders Placed This Encounter   Procedures    Ambulatory referral/consult to Physical/Occupational Therapy     Standing Status:   Future     Standing Expiration Date:   11/3/2024     Referral Priority:   Urgent     Referral Type:   Physical Medicine     Referral Reason:   Specialty Services Required     Number of Visits Requested:   1                Agustín Carter MD

## 2023-10-04 ENCOUNTER — HOSPITAL ENCOUNTER (OUTPATIENT)
Dept: RADIOLOGY | Facility: HOSPITAL | Age: 73
Discharge: HOME | End: 2023-10-04
Attending: FAMILY MEDICINE
Payer: MEDICARE

## 2023-10-04 DIAGNOSIS — E03.9 ACQUIRED HYPOTHYROIDISM: ICD-10-CM

## 2023-10-04 PROCEDURE — 76536 US EXAM OF HEAD AND NECK: CPT | Mod: TC,HCNC,PO

## 2023-10-04 PROCEDURE — 76536 US EXAM OF HEAD AND NECK: CPT | Mod: 26,HCNC,, | Performed by: RADIOLOGY

## 2023-10-04 PROCEDURE — 76536 US SOFT TISSUE HEAD NECK THYROID: ICD-10-PCS | Mod: 26,HCNC,, | Performed by: RADIOLOGY

## 2023-10-06 NOTE — PROGRESS NOTES
1st check to see if patient has seen the results.  If not then  CALL patient with results and Document verification.  Schedule follow-up if needed.  [phone]  Ultrasound of the thyroid reviewed by radiology.  The thyroid is small and has no significant abnormalities noted.  No significant concerns at this time.

## 2023-10-07 NOTE — PROGRESS NOTES
Flores Adamson presented for a  Medicare AWV and comprehensive Health Risk Assessment today. The following components were reviewed and updated:    Medical history  Family History  Social history  Allergies and Current Medications  Health Risk Assessment  Health Maintenance  Care Team         ** See Completed Assessments for Annual Wellness Visit within the encounter summary.**         The following assessments were completed:  Living Situation  CAGE  Depression Screening  Timed Get Up and Go  Whisper Test  Cognitive Function Screening  Nutrition Screening  ADL Screening  PAQ Screening    Review for Opioid Screening: Patient does not have rx for Opioids.  Review for Substance Use Disorders: Patient does not use substance.      Vitals:    09/19/23 1154   BP: 133/71   Pulse: 67   SpO2: 96%   Weight: 80.3 kg (177 lb)     Body mass index is 32.37 kg/m².  Physical Exam  Vitals reviewed.   HENT:      Head: Normocephalic.   Eyes:      Pupils: Pupils are equal, round, and reactive to light.   Cardiovascular:      Rate and Rhythm: Normal rate and regular rhythm.      Heart sounds: Normal heart sounds.   Pulmonary:      Effort: Pulmonary effort is normal.      Breath sounds: Normal breath sounds.   Abdominal:      General: Bowel sounds are normal.      Palpations: Abdomen is soft.   Musculoskeletal:         General: Normal range of motion.      Cervical back: Normal range of motion.      Right lower leg: No edema.      Left lower leg: No edema.   Skin:     General: Skin is warm and dry.   Neurological:      Mental Status: She is alert and oriented to person, place, and time.   Psychiatric:         Behavior: Behavior normal.               Diagnoses and health risks identified today and associated recommendations/orders:    1. Encounter for Medicare annual wellness exam  - Ambulatory Referral/Consult to Enhanced Annual Wellness Visit (eAWV)    2. Encounter for preventive health examination  - Above assessments completed.  Preventive measures and health maintenance reviewed with patient.  -discussed overdue vaccines    3. Polymyalgia rheumatica  Stable, followed by [facility]  -not currently on medications     4. Aortic atherosclerosis  -noted on CT 10/4/21  -encouraged exercise and low fat/low chol/heart healthy diet    5. Acquired hypothyroidism  Stable, followed by PCP  -on levothyroxine    6. Dyslipidemia  Stable, followed by PCP    7. Gastroesophageal reflux disease, unspecified whether esophagitis present  Stable, followed by PCP  -on PPI    8. Dizziness  Stable, followed by PCP  -meclizine prn  -seen PCP yesterday, reviewed all labs with patient today  -pt encouraged to f/u with ENT as instructed by PCP    9. Nausea  Stable, followed by PCP  -zofran prn    10. Osteopenia, unspecified location  Stable, followed by PCP  -on latonya/d  -encouraged weight bearing exercises (walking)      Provided Flores with a 5-10 year written screening schedule and personal prevention plan. Recommendations were developed using the USPSTF age appropriate recommendations. Education, counseling, and referrals were provided as needed. After Visit Summary printed and given to patient which includes a list of additional screenings\tests needed.    Follow up in about 1 year (around 9/19/2024) for your next annual wellness visit.    Concepción Penn NP    I offered to discuss advanced care planning, including how to pick a person who would make decisions for you if you were unable to make them for yourself, called a health care power of , and what kind of decisions you might make such as use of life sustaining treatments such as ventilators and tube feeding when faced with a life limiting illness recorded on a living will that they will need to know. (How you want to be cared for as you near the end of your natural life)     X  Patient has advanced directives on file, which we reviewed, and they do not wish to make changes.   Anabel

## 2023-10-10 ENCOUNTER — CLINICAL SUPPORT (OUTPATIENT)
Dept: REHABILITATION | Facility: HOSPITAL | Age: 73
End: 2023-10-10
Attending: FAMILY MEDICINE
Payer: MEDICARE

## 2023-10-10 DIAGNOSIS — R26.2 DIFFICULTY WALKING: ICD-10-CM

## 2023-10-10 DIAGNOSIS — R26.89 BALANCE PROBLEM: ICD-10-CM

## 2023-10-10 DIAGNOSIS — R42 DIZZINESS: ICD-10-CM

## 2023-10-10 PROCEDURE — 97112 NEUROMUSCULAR REEDUCATION: CPT | Mod: HCNC,PN

## 2023-10-10 PROCEDURE — 97161 PT EVAL LOW COMPLEX 20 MIN: CPT | Mod: HCNC,PN

## 2023-10-10 NOTE — PLAN OF CARE
"[facility]   Physical Therapy Initial Evaluation     Date: 10/10/2023   Name: Flores Adamson  Clinic Number: [id number]    Therapy Diagnosis:   Encounter Diagnoses   Name Primary?    Dizziness     Difficulty walking     Balance problem      Physician: Agustín Carter MD    Physician Orders: PT Eval and Treat   Medical Diagnosis from Referral: R42 (ICD-10-CM) - Dizziness  Evaluation Date: 10/10/2023  Authorization Period Expiration: 10/02/2024  Plan of Care Expiration: 12/10/2023  Progress Note Due: 11/10/2023  Visit # / Visits authorized: 1/ 1   FOTO: 1/ 3     Precautions: Standard    Time In: 1:30 pm  Time Out: 2:15 pm  Total Appointment Time (timed & untimed codes): 45 minutes      SUBJECTIVE   Date of onset: chronic    History of current condition - Coretta reports: history of vertigo last year that lasted about 3 weeks.  She was able to be treated by ENT and he reset the crystals.  Patient reports about a month ago, she began to feel dizzy and nauseated.  She went to primary care and had blood work performed and referred to therapy.  At this time, she feels dizzy and "off" when she is walking, and reports veering off to the side (mainly to the right).  She reports having light spinning at times, but no specific movement causes it.      Falls: no    Imaging, none:     Prior Therapy: yes  Social History:  lives with their spouse  Occupation: not working  Prior Level of Function: independent  Current Level of Function: limiting herself from all normal activities due to dizziness and off balance    dizziness:  Current 0/10, worst 3/10, best 0/10   Description: light headed, light spinning, off  Aggravating Factors: walking, bending over,   Easing Factors: rest    Patients goals: get rid of dizziness and improve steadiness of gait     Medical History:   Past Medical History:   Diagnosis Date    Acquired hypothyroidism 10/6/2021    Anemia 9/18/2023    Angina pectoris     Anticoagulant " long-term use     CAD (coronary artery disease)     with stents x 3    GERD (gastroesophageal reflux disease)        Surgical History:   Flores Adamson  has a past surgical history that includes 2006 2 stents; 2009 1 stent; Wrist fracture surgery (03/2018); Esophagogastroduodenoscopy (N/A, 11/28/2018); and Cholecystectomy (2021).    Medications:   Flores has a current medication list which includes the following prescription(s): ascorbic acid (vitamin c), aspirin, atorvastatin, benzonatate, calcium-vitamin d3, carbamide peroxide, diclofenac, famotidine, fluticasone propionate, gabapentin, levothyroxine, meclizine, montelukast, multivitamin, nitroglycerin, ondansetron, pantoprazole, and tramadol.    Allergies:   Review of patient's allergies indicates:   Allergen Reactions    Sulfa (sulfonamide antibiotics) Shortness Of Breath          OBJECTIVE     Posture:   Forward head/rounded shoulder posture     Lower Extremity Strength  Right LE  Left LE    Knee extension: 4+/5 Knee extension: 4+/5   Knee flexion: 4+/5 Knee flexion: 4+/5   Hip flexion: 4-/5 Hip flexion: 4-/5   Hip extension:  4-/5 Hip extension: 4-/5   Hip abduction: 4-/5 Hip abduction: 4-/5   Hip adduction: 4/5 Hip adduction 4/5   Ankle dorsiflexion: 4+/5 Ankle dorsiflexion: 4+/5   Ankle plantarflexion: 4+/5 Ankle plantarflexion: 4+/5     Sensation: Light Touch: intact                 Occulomotoer Exam  Spontaneous Nystagmus: absent  Gaze-Evoked Nystagmus: absent  Eye movement range: normal    Central Tests:  Smooth Pursuit: horizontal: normal; vertical: normal  Saccades:    Right: normal   Left: normal   Up: normal   Down:  normal  VOR Cancellation: normal    Peripheral Tests:  Head Impulse Test: positive to right  Head Shake Test: nystagmus noted    BPPV testing:   Posterior canal:  Right Michelle-Hallpike: negative  Left [place]-Hallpike: negative    Horizontal canal:   Roll Test Right: negative  Roll Test Left: negative    Balance Testing:     Single leg  stance:  SLS (R): 2 seconds  SLS (L): 1 seconds    Sharpened Romberg:   EO: 23 seconds      mCTSIB:  Condition 1 (Normal vision, fixed support): 30 seconds  Condition 2 (absent vision, fixed support): 30 seconds, increased sway noted  Condition 4 (normal vision, sway-referenced support): 30 seconds  Condition 5 (absent vision, sway-referenced support): 8 seconds    TU seconds; loss of balance during turn    Dynamic Gait Index (DGI):  1. Gait level surface -  2  2. Change in gait speed - 2  3. Gait with horizontal head turns - 2  4. Gait with vertical head turns - 2  5. Gait and pivot turn - 2  6. Step over obstacle - 2  7. Step around obstacle - 3  8. Steps - 2  Total  (< related to falls in older adults (vestibular included)       Intake Outcome Measure for FOTO Vestibular Survey    Therapist reviewed FOTO scores for Flores Adamson on 10/10/2023.   FOTO documents entered into [employer] - see Media section.    Intake Score: 39%           TREATMENT     Total Treatment time (time-based codes) separate from Evaluation: 8 minutes     Coretta received the treatments listed below:       therapeutic exercises to develop strength, endurance, ROM, flexibility, and core stabilization for (0)  minutes including:       manual therapy techniques applied for (0) minutes, including:      neuromuscular re-education activities to improve: Balance, Coordination, Kinesthetic, Sense, Proprioception, and Posture for (8)  minutes., including:  - patient education, instruction and demonstration of HEP    dynamic functional therapeutic activities to improve functional performance for (0)  minutes, including:        PATIENT EDUCATION AND HOME EXERCISES     Education provided:   - role of PT and goals for therapy  - [facility]    Written Home Exercises Provided: yes. Exercises were reviewed and Coretta was able to demonstrate them prior to the end of the session.  Coretta demonstrated good  understanding of the education provided. See EMR  under Patient Instructions for exercises provided during therapy sessions.    ASSESSMENT     Flores is a 73 y.o. female referred to outpatient Physical Therapy with a medical diagnosis of dizziness.  Patient presents with signs and symptoms consistent with the diagnosis.  Patient is noted to have positive peripheral testing, decreased balance testing especially with narrow base of support and eyes closed, but negative BPPV testing.  She is noted to have decreased overall strength and endurance which also may be contributing to decreased balance.  Patient would benefit from vestibular therapy, balance training, LE and core strengthening, and canalith repositioning to decrease dizziness, improve balance, and functional mobility during ADL's.       Patient prognosis is Good.   Patientt will benefit from skilled outpatient Physical Therapy to address the deficits stated above and in the chart below, provide patient /family education, and to maximize patientt's level of independence.     Plan of care discussed with patient: Yes  Patient's spiritual, cultural and educational needs considered and patient is agreeable to the plan of care and goals as stated below:     Anticipated Barriers for therapy: none    Medical Necessity is demonstrated by the following  History  Co-morbidities and personal factors that may impact the plan of care [x] LOW: no personal factors / co-morbidities  [] MODERATE: 1-2 personal factors / co-morbidities  [] HIGH: 3+ personal factors / co-morbidities    Moderate / High Support Documentation:      Examination  Body Structures and Functions, activity limitations and participation restrictions that may impact the plan of care [x] LOW: addressing 1-2 elements  [] MODERATE: 3+ elements  [] HIGH: 4+ elements (please support below)    Moderate / High Support Documentation:      Clinical Presentation [x] LOW: stable  [] MODERATE: Evolving  [] HIGH: Unstable     Decision Making/ Complexity Score: low        Goals:  Short Term Goals: 4 weeks   Patient will be independent with initial HEP.  Patient will report a decrease in dizziness to 2/10 during prolonged walking and quick turns.  Patient will improve condition 5 on mCTSIB from 8 seconds to 20 seconds to demonstrate improved use of vestibular system for balance.    Long Term Goals: 8 weeks   Patient will be compliant with updated HEP to promote the independent management of current diagnosis.  Patient will improve TUG time from 17 seconds to <10 seconds to demonstrate improved functional mobility.  Patient will report a decrease in dizziness to 0/10 during prolonged walking and quick turns.  Patient will improve condition 5 on mCTSIB from 8 seconds to 30 seconds to demonstrate improved use of vestibular system for balance.  Patient will have improved FOTO status from 39 to 60 to demonstrate improved functional mobility.     PLAN   Plan of care Certification: 10/10/2023 to 12/10/2023.    Outpatient Physical Therapy 2 times weekly for 8 weeks to include the following interventions: Gait Training, Manual Therapy, Moist Heat/ Ice, Neuromuscular Re-ed, Patient Education, Therapeutic Activities, Therapeutic Exercise, and canalith repositioning, vestibular therapy .     Jr Draper, PT      I CERTIFY THE NEED FOR THESE SERVICES FURNISHED UNDER THIS PLAN OF TREATMENT AND WHILE UNDER MY CARE   Physician's comments:     Physician's Signature: ___________________________________________________

## 2023-10-11 PROBLEM — R26.89 BALANCE PROBLEM: Status: ACTIVE | Noted: 2023-10-11

## 2023-10-11 PROBLEM — R26.2 DIFFICULTY WALKING: Status: ACTIVE | Noted: 2023-10-11

## 2023-10-16 RX ORDER — PANTOPRAZOLE SODIUM 40 MG/1
TABLET, DELAYED RELEASE ORAL
Qty: 180 TABLET | Refills: 10 | Status: SHIPPED | OUTPATIENT
Start: 2023-10-16

## 2023-10-16 NOTE — TELEPHONE ENCOUNTER
Refill Routing Note     Refill Routing Note   Medication(s) are not appropriate for processing by [facility] for the following reason(s):      Medication outside of protocol    [facility] action(s):  Route Care Due:  None identified     Medication Therapy Plan: 40 mg BID outside protocol      Appointments  past 12m or future 3m with PCP    Date Provider   Last Visit   10/3/2023 Agustín Carter MD   Next Visit   Visit date not found Agustín Carter MD   ED visits in past 90 days: 0        Note composed:10:00 AM 10/16/2023

## 2023-10-16 NOTE — TELEPHONE ENCOUNTER
No care due was identified.  [facility] Embedded Care Due Messages. Reference number: [id number].   10/16/2023 9:52:41 AM CDT

## 2023-10-18 ENCOUNTER — OFFICE VISIT (OUTPATIENT)
Dept: CARDIOLOGY | Facility: CLINIC | Age: 73
End: 2023-10-18
Payer: MEDICARE

## 2023-10-18 VITALS
BODY MASS INDEX: 33.39 KG/M2 | HEART RATE: 70 BPM | SYSTOLIC BLOOD PRESSURE: 145 MMHG | DIASTOLIC BLOOD PRESSURE: 78 MMHG | RESPIRATION RATE: 18 BRPM | HEIGHT: 62 IN | WEIGHT: 181.44 LBS

## 2023-10-18 DIAGNOSIS — I25.10 ATHEROSCLEROSIS OF NATIVE CORONARY ARTERY WITHOUT ANGINA PECTORIS, UNSPECIFIED WHETHER NATIVE OR TRANSPLANTED HEART: Primary | ICD-10-CM

## 2023-10-18 DIAGNOSIS — E78.5 DYSLIPIDEMIA: Chronic | ICD-10-CM

## 2023-10-18 PROCEDURE — 3288F FALL RISK ASSESSMENT DOCD: CPT | Mod: HCNC,CPTII,S$GLB, | Performed by: INTERNAL MEDICINE

## 2023-10-18 PROCEDURE — 3078F PR MOST RECENT DIASTOLIC BLOOD PRESSURE < 80 MM HG: ICD-10-PCS | Mod: HCNC,CPTII,S$GLB, | Performed by: INTERNAL MEDICINE

## 2023-10-18 PROCEDURE — 3077F SYST BP >= 140 MM HG: CPT | Mod: HCNC,CPTII,S$GLB, | Performed by: INTERNAL MEDICINE

## 2023-10-18 PROCEDURE — 3044F HG A1C LEVEL LT 7.0%: CPT | Mod: HCNC,CPTII,S$GLB, | Performed by: INTERNAL MEDICINE

## 2023-10-18 PROCEDURE — 1159F MED LIST DOCD IN RCRD: CPT | Mod: HCNC,CPTII,S$GLB, | Performed by: INTERNAL MEDICINE

## 2023-10-18 PROCEDURE — 1159F PR MEDICATION LIST DOCUMENTED IN MEDICAL RECORD: ICD-10-PCS | Mod: HCNC,CPTII,S$GLB, | Performed by: INTERNAL MEDICINE

## 2023-10-18 PROCEDURE — 1101F PR PT FALLS ASSESS DOC 0-1 FALLS W/OUT INJ PAST YR: ICD-10-PCS | Mod: HCNC,CPTII,S$GLB, | Performed by: INTERNAL MEDICINE

## 2023-10-18 PROCEDURE — 99999 PR PBB SHADOW E&M-EST. PATIENT-LVL IV: ICD-10-PCS | Mod: PBBFAC,HCNC,, | Performed by: INTERNAL MEDICINE

## 2023-10-18 PROCEDURE — 3288F PR FALLS RISK ASSESSMENT DOCUMENTED: ICD-10-PCS | Mod: HCNC,CPTII,S$GLB, | Performed by: INTERNAL MEDICINE

## 2023-10-18 PROCEDURE — 3078F DIAST BP <80 MM HG: CPT | Mod: HCNC,CPTII,S$GLB, | Performed by: INTERNAL MEDICINE

## 2023-10-18 PROCEDURE — 99999 PR PBB SHADOW E&M-EST. PATIENT-LVL IV: CPT | Mod: PBBFAC,HCNC,, | Performed by: INTERNAL MEDICINE

## 2023-10-18 PROCEDURE — 1101F PT FALLS ASSESS-DOCD LE1/YR: CPT | Mod: HCNC,CPTII,S$GLB, | Performed by: INTERNAL MEDICINE

## 2023-10-18 PROCEDURE — 99214 OFFICE O/P EST MOD 30 MIN: CPT | Mod: HCNC,S$GLB,, | Performed by: INTERNAL MEDICINE

## 2023-10-18 PROCEDURE — 3077F PR MOST RECENT SYSTOLIC BLOOD PRESSURE >= 140 MM HG: ICD-10-PCS | Mod: HCNC,CPTII,S$GLB, | Performed by: INTERNAL MEDICINE

## 2023-10-18 PROCEDURE — 1126F PR PAIN SEVERITY QUANTIFIED, NO PAIN PRESENT: ICD-10-PCS | Mod: HCNC,CPTII,S$GLB, | Performed by: INTERNAL MEDICINE

## 2023-10-18 PROCEDURE — 99214 PR OFFICE/OUTPT VISIT, EST, LEVL IV, 30-39 MIN: ICD-10-PCS | Mod: HCNC,S$GLB,, | Performed by: INTERNAL MEDICINE

## 2023-10-18 PROCEDURE — 1160F PR REVIEW ALL MEDS BY PRESCRIBER/CLIN PHARMACIST DOCUMENTED: ICD-10-PCS | Mod: HCNC,CPTII,S$GLB, | Performed by: INTERNAL MEDICINE

## 2023-10-18 PROCEDURE — 1126F AMNT PAIN NOTED NONE PRSNT: CPT | Mod: HCNC,CPTII,S$GLB, | Performed by: INTERNAL MEDICINE

## 2023-10-18 PROCEDURE — 1160F RVW MEDS BY RX/DR IN RCRD: CPT | Mod: HCNC,CPTII,S$GLB, | Performed by: INTERNAL MEDICINE

## 2023-10-18 PROCEDURE — 3044F PR MOST RECENT HEMOGLOBIN A1C LEVEL <7.0%: ICD-10-PCS | Mod: HCNC,CPTII,S$GLB, | Performed by: INTERNAL MEDICINE

## 2023-10-18 NOTE — PROGRESS NOTES
Subjective:    Patient ID:  Flores Adamson is a 73 y.o. female who presents for follow-up of coronary artery disease    HPI  She comes with extreme tiredness and fatigue for the last couple of months.  No chest pain as such.  Shortness of breath has been more significant also.  Blood pressure normal at home.  No syncope or near-syncope  Functional class 2-3    Review of Systems   Constitutional: Negative for decreased appetite, malaise/fatigue, weight gain and weight loss.   Cardiovascular:  Negative for chest pain, dyspnea on exertion, leg swelling, palpitations and syncope.   Respiratory:  Negative for cough and shortness of breath.    Gastrointestinal: Negative.    Neurological:  Negative for weakness.   All other systems reviewed and are negative.     Objective:      Physical Exam  Vitals and nursing note reviewed.   Constitutional:       Appearance: Normal appearance. She is well-developed.   HENT:      Head: Normocephalic.   Eyes:      Pupils: Pupils are equal, round, and reactive to light.   Neck:      Thyroid: No thyromegaly.      Vascular: No carotid bruit or JVD.   Cardiovascular:      Rate and Rhythm: Normal rate and regular rhythm.      Chest Wall: PMI is not displaced.      Pulses: Normal pulses and intact distal pulses.      Heart sounds: Normal heart sounds. No murmur heard.     No gallop.   Pulmonary:      Effort: Pulmonary effort is normal.      Breath sounds: Normal breath sounds.   Abdominal:      Palpations: Abdomen is soft. There is no mass.      Tenderness: There is no abdominal tenderness.   Musculoskeletal:         General: Normal range of motion.      Cervical back: Normal range of motion and neck supple.   Skin:     General: Skin is warm.   Neurological:      Mental Status: She is alert and oriented to person, place, and time.      Sensory: No sensory deficit.      Deep Tendon Reflexes: Reflexes are normal and symmetric.             Most Recent Echocardiogram Results  Results for orders  placed in visit on 06/03/22    Stress Echo    Interpretation Summary  · There were no arrhythmias during stress.  · Concentric hypertrophy and normal systolic function.  · The estimated ejection fraction is 55%.  · Normal left ventricular diastolic function.  · Normal right ventricular size with normal right ventricular systolic function.  · The stress echo portion of this study is negative for myocardial ischemia.  · The ECG portion of this study is negative for myocardial ischemia.      Most Recent Nuclear Stress Test Results  No results found for this or any previous visit.      Most Recent Cardiac PET Stress Test Results  No results found for this or any previous visit.      Most Recent Cardiovascular Angiogram results  No results found for this or any previous visit.      Other Most Recent Cardiology Results  No results found for this or any previous visit.      Labs reviewed    Assessment:       1. Atherosclerosis of native coronary artery without angina pectoris, unspecified whether native or transplanted heart    2. Dyslipidemia         Plan:     Continue:  ASA and Statin  Regular exercise program  Weight loss  Low cholesterol diet  Nuclear stress test.  Call with results    Nine month follow-up if stress test okay

## 2023-11-07 ENCOUNTER — PATIENT MESSAGE (OUTPATIENT)
Dept: RADIOLOGY | Facility: HOSPITAL | Age: 73
End: 2023-11-07
Payer: MEDICARE

## 2023-11-08 ENCOUNTER — HOSPITAL ENCOUNTER (OUTPATIENT)
Dept: RADIOLOGY | Facility: HOSPITAL | Age: 73
Discharge: HOME | End: 2023-11-08
Attending: INTERNAL MEDICINE
Payer: MEDICARE

## 2023-11-08 ENCOUNTER — CLINICAL SUPPORT (OUTPATIENT)
Dept: CARDIOLOGY | Facility: HOSPITAL | Age: 73
End: 2023-11-08
Attending: INTERNAL MEDICINE
Payer: MEDICARE

## 2023-11-08 VITALS — BODY MASS INDEX: 33.31 KG/M2 | HEIGHT: 62 IN | WEIGHT: 181 LBS

## 2023-11-08 DIAGNOSIS — E78.5 DYSLIPIDEMIA: Chronic | ICD-10-CM

## 2023-11-08 DIAGNOSIS — I25.10 ATHEROSCLEROSIS OF NATIVE CORONARY ARTERY WITHOUT ANGINA PECTORIS, UNSPECIFIED WHETHER NATIVE OR TRANSPLANTED HEART: ICD-10-CM

## 2023-11-08 PROCEDURE — A9502 TC99M TETROFOSMIN: HCPCS | Mod: HCNC,PO

## 2023-11-08 PROCEDURE — 93018 PR CARDIAC STRESS TST,INTERP/REPT ONLY: ICD-10-PCS | Mod: HCNC,,, | Performed by: INTERNAL MEDICINE

## 2023-11-08 PROCEDURE — 93016 CV STRESS TEST SUPVJ ONLY: CPT | Mod: HCNC,,, | Performed by: INTERNAL MEDICINE

## 2023-11-08 PROCEDURE — 93018 CV STRESS TEST I&R ONLY: CPT | Mod: HCNC,,, | Performed by: INTERNAL MEDICINE

## 2023-11-08 PROCEDURE — 78452 HT MUSCLE IMAGE SPECT MULT: CPT | Mod: 26,HCNC,, | Performed by: INTERNAL MEDICINE

## 2023-11-08 PROCEDURE — 93016 NUCLEAR STRESS - CARDIOLOGY INTERPRETED (CUPID ONLY): ICD-10-PCS | Mod: HCNC,,, | Performed by: INTERNAL MEDICINE

## 2023-11-08 PROCEDURE — 78452 NUCLEAR STRESS - CARDIOLOGY INTERPRETED (CUPID ONLY): ICD-10-PCS | Mod: 26,HCNC,, | Performed by: INTERNAL MEDICINE

## 2023-11-08 PROCEDURE — 63600175 PHARM REV CODE 636 W HCPCS: Mod: HCNC,PO | Performed by: INTERNAL MEDICINE

## 2023-11-08 PROCEDURE — 93017 CV STRESS TEST TRACING ONLY: CPT | Mod: HCNC,PO

## 2023-11-08 RX ORDER — REGADENOSON 0.08 MG/ML
0.4 INJECTION, SOLUTION INTRAVENOUS
Status: COMPLETED | OUTPATIENT
Start: 2023-11-08 | End: 2023-11-08

## 2023-11-08 RX ADMIN — REGADENOSON 0.4 MG: 0.08 INJECTION, SOLUTION INTRAVENOUS at 08:11

## 2023-11-09 LAB
CV PHARM DOSE: 0.4 MG
CV STRESS BASE HR: 85 BPM
DIASTOLIC BLOOD PRESSURE: 74 MMHG
NUC STRESS EJECTION FRACTION: 79 %
OHS CV CPX 1 MINUTE RECOVERY HEART RATE: 93 BPM
OHS CV CPX 85 PERCENT MAX PREDICTED HEART RATE MALE: 123
OHS CV CPX MAX PREDICTED HEART RATE: 145
OHS CV CPX PATIENT IS FEMALE: 1
OHS CV CPX PATIENT IS MALE: 0
OHS CV CPX PEAK DIASTOLIC BLOOD PRESSURE: 73 MMHG
OHS CV CPX PEAK HEAR RATE: 112 BPM
OHS CV CPX PEAK RATE PRESSURE PRODUCT: NORMAL
OHS CV CPX PEAK SYSTOLIC BLOOD PRESSURE: 157 MMHG
OHS CV CPX PERCENT MAX PREDICTED HEART RATE ACHIEVED: 80
OHS CV CPX RATE PRESSURE PRODUCT PRESENTING: NORMAL
OHS CV PHARM TIME: 854 MIN
SYSTOLIC BLOOD PRESSURE: 151 MMHG

## 2023-11-15 RX ORDER — MONTELUKAST SODIUM 10 MG/1
TABLET ORAL
Qty: 90 TABLET | Refills: 3 | Status: SHIPPED | OUTPATIENT
Start: 2023-11-15

## 2023-11-15 NOTE — TELEPHONE ENCOUNTER
No care due was identified.  [facility] Embedded Care Due Messages. Reference number: [id number].   11/15/2023 2:51:35 AM CST

## 2023-11-15 NOTE — TELEPHONE ENCOUNTER
Refill Decision Note   Flores Adamson  is requesting a refill authorization.  Brief Assessment and Rationale for Refill:  Approve     Medication Therapy Plan:         Comments:     Note composed:6:17 AM 11/15/2023

## 2023-12-11 DIAGNOSIS — E03.9 ACQUIRED HYPOTHYROIDISM: ICD-10-CM

## 2023-12-11 RX ORDER — LEVOTHYROXINE SODIUM 25 UG/1
TABLET ORAL
Qty: 90 TABLET | Refills: 3 | Status: SHIPPED | OUTPATIENT
Start: 2023-12-11

## 2023-12-12 NOTE — TELEPHONE ENCOUNTER
No care due was identified.  [facility] Embedded Care Due Messages. Reference number: [id number].   12/11/2023 7:00:39 PM CST

## 2023-12-12 NOTE — TELEPHONE ENCOUNTER
Refill Decision Note   Flores Adamson  is requesting a refill authorization.  Brief Assessment and Rationale for Refill:  Approve     Medication Therapy Plan:  TSH-WNL      Comments:     Note composed:7:36 PM 12/11/2023             Appointments     Last Visit   10/3/2023 Agustín Carter MD   Next Visit   Visit date not found Agustín Carter MD

## 2023-12-28 DIAGNOSIS — E78.5 DYSLIPIDEMIA: ICD-10-CM

## 2023-12-28 RX ORDER — ATORVASTATIN CALCIUM 40 MG/1
TABLET, FILM COATED ORAL
Qty: 90 TABLET | Refills: 3 | Status: SHIPPED | OUTPATIENT
Start: 2023-12-28

## 2024-01-04 ENCOUNTER — CLINICAL SUPPORT (OUTPATIENT)
Dept: FAMILY MEDICINE | Facility: CLINIC | Age: 74
End: 2024-01-04
Payer: MEDICARE

## 2024-01-04 DIAGNOSIS — Z23 NEED FOR VACCINATION: Primary | ICD-10-CM

## 2024-01-04 PROCEDURE — 99999 PR PBB SHADOW E&M-EST. PATIENT-LVL II: CPT | Mod: PBBFAC,HCNC,,

## 2024-01-04 PROCEDURE — G0008 ADMIN INFLUENZA VIRUS VAC: HCPCS | Mod: HCNC,S$GLB,, | Performed by: FAMILY MEDICINE

## 2024-01-04 PROCEDURE — 90694 VACC AIIV4 NO PRSRV 0.5ML IM: CPT | Mod: HCNC,S$GLB,, | Performed by: FAMILY MEDICINE

## 2024-02-06 DIAGNOSIS — K21.00 GASTROESOPHAGEAL REFLUX DISEASE WITH ESOPHAGITIS WITHOUT HEMORRHAGE: ICD-10-CM

## 2024-02-06 RX ORDER — FAMOTIDINE 20 MG/1
TABLET, FILM COATED ORAL
Qty: 180 TABLET | Refills: 2 | Status: SHIPPED | OUTPATIENT
Start: 2024-02-06

## 2024-02-06 RX ORDER — GABAPENTIN 300 MG/1
CAPSULE ORAL
Qty: 90 CAPSULE | Refills: 3 | Status: SHIPPED | OUTPATIENT
Start: 2024-02-06

## 2024-02-06 NOTE — TELEPHONE ENCOUNTER
Refill Routing Note   Medication(s) are not appropriate for processing by [facility] for the following reason(s):        Outside of protocol    [facility] action(s):  Route               Appointments  past 12m or future 3m with PCP    Date Provider   Last Visit   10/3/2023 Agustín Carter MD   Next Visit   Visit date not found Agustín Carter MD   ED visits in past 90 days: 0        Note composed:4:02 PM 02/06/2024

## 2024-02-29 ENCOUNTER — OFFICE VISIT (OUTPATIENT)
Dept: HOME HEALTH SERVICES | Facility: CLINIC | Age: 74
End: 2024-02-29
Payer: MEDICARE

## 2024-02-29 VITALS
OXYGEN SATURATION: 97 % | HEART RATE: 59 BPM | WEIGHT: 180 LBS | SYSTOLIC BLOOD PRESSURE: 149 MMHG | DIASTOLIC BLOOD PRESSURE: 82 MMHG | BODY MASS INDEX: 32.92 KG/M2

## 2024-02-29 DIAGNOSIS — Z00.00 ENCOUNTER FOR PREVENTIVE HEALTH EXAMINATION: Primary | ICD-10-CM

## 2024-02-29 DIAGNOSIS — M85.80 OSTEOPENIA, UNSPECIFIED LOCATION: ICD-10-CM

## 2024-02-29 DIAGNOSIS — E03.9 ACQUIRED HYPOTHYROIDISM: Chronic | ICD-10-CM

## 2024-02-29 DIAGNOSIS — I70.0 AORTIC ATHEROSCLEROSIS: ICD-10-CM

## 2024-02-29 DIAGNOSIS — E78.5 DYSLIPIDEMIA: Chronic | ICD-10-CM

## 2024-02-29 DIAGNOSIS — M35.3 POLYMYALGIA RHEUMATICA: ICD-10-CM

## 2024-02-29 DIAGNOSIS — K21.9 GASTROESOPHAGEAL REFLUX DISEASE, UNSPECIFIED WHETHER ESOPHAGITIS PRESENT: ICD-10-CM

## 2024-02-29 PROCEDURE — 3288F FALL RISK ASSESSMENT DOCD: CPT | Mod: CPTII,S$GLB,, | Performed by: NURSE PRACTITIONER

## 2024-02-29 PROCEDURE — G0439 PPPS, SUBSEQ VISIT: HCPCS | Mod: S$GLB,,, | Performed by: NURSE PRACTITIONER

## 2024-02-29 PROCEDURE — 3077F SYST BP >= 140 MM HG: CPT | Mod: CPTII,S$GLB,, | Performed by: NURSE PRACTITIONER

## 2024-02-29 PROCEDURE — 3079F DIAST BP 80-89 MM HG: CPT | Mod: CPTII,S$GLB,, | Performed by: NURSE PRACTITIONER

## 2024-02-29 PROCEDURE — 1159F MED LIST DOCD IN RCRD: CPT | Mod: CPTII,S$GLB,, | Performed by: NURSE PRACTITIONER

## 2024-02-29 PROCEDURE — 1101F PT FALLS ASSESS-DOCD LE1/YR: CPT | Mod: CPTII,S$GLB,, | Performed by: NURSE PRACTITIONER

## 2024-02-29 PROCEDURE — 1170F FXNL STATUS ASSESSED: CPT | Mod: CPTII,S$GLB,, | Performed by: NURSE PRACTITIONER

## 2024-02-29 PROCEDURE — 1160F RVW MEDS BY RX/DR IN RCRD: CPT | Mod: CPTII,S$GLB,, | Performed by: NURSE PRACTITIONER

## 2024-02-29 NOTE — PATIENT INSTRUCTIONS
Counseling and Referral of Other Preventative  (Italic type indicates deductible and co-insurance are waived)    Patient Name: Flores Adamson  Today's Date: 2/29/2024    Health Maintenance       Date Due Completion Date    TETANUS VACCINE Never done ---    RSV Vaccine (Age 60+ and Pregnant patients) (1 - 1-dose 60+ series) Never done ---    COVID-19 Vaccine (6 - 2023-24 season) 01/23/2024 11/28/2023    Mammogram 05/23/2024 5/23/2023    Override on 8/10/2016: Done ([place].  BI-RADS 1 - Negative.)    Lipid Panel 09/18/2024 9/18/2023    Aspirin/Antiplatelet Therapy 10/18/2024 10/18/2023    High Dose Statin 12/28/2024 12/28/2023    Colorectal Cancer Screening 08/14/2025 8/14/2015    DEXA Scan 11/14/2025 11/14/2022        No orders of the defined types were placed in this encounter.    The following information is provided to all patients.  This information is to help you find resources for any of the problems found today that may be affecting your health:                  Living healthy guide: www.[facility].[place].gov      Understanding Diabetes: www.diabetes.org      Eating healthy: www.cdc.gov/healthyweight      CDC home safety checklist: www.cdc.gov/steadi/patient.html      Agency on Aging: www.goea.[place].[facility]      Alcoholics anonymous (AA): www.aa.org      Physical Activity: www.kaye.nih.gov/qn4cofb      Tobacco use: www.quitwithusla.org

## 2024-02-29 NOTE — PROGRESS NOTES
Flores Adamson presented for a follow-up Medicare AWV today. The following components were reviewed and updated:    Medical history  Family History  Social history  Allergies and Current Medications  Health Risk Assessment  Health Maintenance  Care Team    **See Completed Assessments for Annual Wellness visit with in the encounter summary    The following assessments were completed:  Depression Screening  Cognitive function Screening    Timed Get Up Test  Whisper Test      Opioid documentation:      Patient does not have a current opioid prescription.          Vitals:    02/29/24 1123   BP: (!) 149/82   Pulse: (!) 59   SpO2: 97%   Weight: 81.6 kg (180 lb)     Body mass index is 32.92 kg/m².       Physical Exam  HENT:      Head: Normocephalic.      Nose: Nose normal.   Eyes:      Pupils: Pupils are equal, round, and reactive to light.   Cardiovascular:      Pulses: Normal pulses.      Heart sounds: Normal heart sounds.   Pulmonary:      Effort: Pulmonary effort is normal.      Breath sounds: Normal breath sounds.   Abdominal:      General: Bowel sounds are normal.   Musculoskeletal:         General: Normal range of motion.      Cervical back: Normal range of motion.      Right lower leg: No edema.      Left lower leg: No edema.   Skin:     General: Skin is warm and dry.   Neurological:      Mental Status: She is alert and oriented to person, place, and time.           Diagnoses and health risks identified today and associated recommendations/orders:  1. Encounter for preventive health examination  - Above assessments completed. Preventive measures and health maintenance reviewed with patient.  -discussed overdue vaccines, can have done at any pharmacy.    2. Polymyalgia rheumatica  Stable, followed by Rheumatology    3. Aortic atherosclerosis  Stable, followed by PCP  -on statin and asa    4. Dyslipidemia  Stable, followed by PCP  -on statin    5. Gastroesophageal reflux disease, unspecified whether esophagitis  present  Stable, followed by PCP  -on PPI and famotidine    6. Acquired hypothyroidism  Stable, followed by PCP  -on levothyroxine    7. Osteopenia, unspecified location  Stable, followed by PCP  -on Sharath/D      Provided Flores with a 5-10 year written screening schedule and personal prevention plan. Recommendations were developed using the USPSTF age appropriate recommendations. Education, counseling, and referrals were provided as needed.  After Visit Summary printed and given to patient which includes a list of additional screenings\tests needed.    Follow up in about 1 year (around 2/28/2025) for your next annual wellness visit.      Concepción Penn NP    I offered to discuss advanced care planning, including how to pick a person who would make decisions for you if you were unable to make them for yourself, called a health care power of , and what kind of decisions you might make such as use of life sustaining treatments such as ventilators and tube feeding when faced with a life limiting illness recorded on a living will that they will need to know. (How you want to be cared for as you near the end of your natural life)     X Patient is interested in learning more about how to make advanced directives.  I provided them paperwork and offered to discuss this with them.

## 2024-03-25 ENCOUNTER — TELEPHONE (OUTPATIENT)
Dept: CARDIOLOGY | Facility: CLINIC | Age: 74
End: 2024-03-25
Payer: MEDICARE

## 2024-03-25 NOTE — TELEPHONE ENCOUNTER
----- Message from Ta Mccurdy sent at 3/25/2024  3:01 PM CDT -----  Type: Needs Medical Advice    Who Called:  Pt    Best Call Back Number: [phone]    Additional Information: Pt states that she is having thumb surgery on 4/05, she has 3 stints. On her paper for surgery it states that she meets the requirements to get EKG done before this surgery as she is below a certain weight and has a carditic history. She needs an EKG done asap as she will be due in surgery 4/5. Please call back to advise, Thanks!

## 2024-03-25 NOTE — TELEPHONE ENCOUNTER
Spoke with pt. I informed her that she would need to have her surgeon to send a clearance request over.   Pt mentioned getting an EKG done due to cardiac history. I will send over clearance request once I receive it.

## 2024-03-26 ENCOUNTER — TELEPHONE (OUTPATIENT)
Dept: CARDIOLOGY | Facility: CLINIC | Age: 74
End: 2024-03-26
Payer: MEDICARE

## 2024-03-26 NOTE — TELEPHONE ENCOUNTER
Pt having right thumb TFR  MAC   Stent implant  Pt taking asa    [facility]  P- [phone]  F- [phone]

## 2024-09-08 DIAGNOSIS — K21.00 GASTROESOPHAGEAL REFLUX DISEASE WITH ESOPHAGITIS WITHOUT HEMORRHAGE: ICD-10-CM

## 2024-09-08 RX ORDER — FAMOTIDINE 20 MG/1
TABLET, FILM COATED ORAL
Qty: 180 TABLET | Refills: 0 | Status: SHIPPED | OUTPATIENT
Start: 2024-09-08

## 2024-09-08 RX ORDER — MONTELUKAST SODIUM 10 MG/1
TABLET ORAL
Qty: 90 TABLET | Refills: 0 | Status: SHIPPED | OUTPATIENT
Start: 2024-09-08

## 2024-09-08 NOTE — TELEPHONE ENCOUNTER
Refill Decision Note   Flores Adamson  is requesting a refill authorization.  Brief Assessment and Rationale for Refill:  Approve     Medication Therapy Plan:         Alert overridden per protocol: Yes   Comments:     Note composed:4:33 PM 09/08/2024             Appointments     Last Visit   10/3/2023 Agustín Carter MD   Next Visit   Visit date not found Agustín Carter MD

## 2024-09-08 NOTE — TELEPHONE ENCOUNTER
No care due was identified.  [facility] Embedded Care Due Messages. Reference number: [id number].   9/08/2024 1:43:09 AM CDT

## 2024-09-19 ENCOUNTER — NURSE TRIAGE (OUTPATIENT)
Dept: ADMINISTRATIVE | Facility: CLINIC | Age: 74
End: 2024-09-19
Payer: MEDICARE

## 2024-09-19 NOTE — TELEPHONE ENCOUNTER
"Raised bump to L hip, a little bigger than a dime. Sore to touch, no drainage. "Feels fuzzy".     Dispo-be seen today or tomorrow. Apt scheduled for today.     Reason for Disposition   Caller can't describe it clearly    Additional Information   Negative: Patient sounds very sick or weak to the triager   Negative: Fever and bump is tender to touch   Negative: Looks infected (e.g., spreading redness, red streak, pus)   Negative: Looks like a boil, infected sore, or deep ulcer    Protocols used: Skin Lesion - Moles or Growths-A-OH    "

## 2024-09-23 ENCOUNTER — OFFICE VISIT (OUTPATIENT)
Dept: FAMILY MEDICINE | Facility: CLINIC | Age: 74
End: 2024-09-23
Payer: MEDICARE

## 2024-09-23 VITALS
RESPIRATION RATE: 16 BRPM | SYSTOLIC BLOOD PRESSURE: 126 MMHG | TEMPERATURE: 98 F | WEIGHT: 180.13 LBS | OXYGEN SATURATION: 98 % | BODY MASS INDEX: 33.15 KG/M2 | HEART RATE: 72 BPM | HEIGHT: 62 IN | DIASTOLIC BLOOD PRESSURE: 72 MMHG

## 2024-09-23 DIAGNOSIS — Z12.31 BREAST CANCER SCREENING BY MAMMOGRAM: ICD-10-CM

## 2024-09-23 DIAGNOSIS — L98.9 SKIN LESION: Primary | ICD-10-CM

## 2024-09-23 PROCEDURE — 99213 OFFICE O/P EST LOW 20 MIN: CPT | Mod: HCNC,S$GLB,, | Performed by: NURSE PRACTITIONER

## 2024-09-23 PROCEDURE — 1159F MED LIST DOCD IN RCRD: CPT | Mod: HCNC,CPTII,S$GLB, | Performed by: NURSE PRACTITIONER

## 2024-09-23 PROCEDURE — 1160F RVW MEDS BY RX/DR IN RCRD: CPT | Mod: HCNC,CPTII,S$GLB, | Performed by: NURSE PRACTITIONER

## 2024-09-23 PROCEDURE — 1126F AMNT PAIN NOTED NONE PRSNT: CPT | Mod: HCNC,CPTII,S$GLB, | Performed by: NURSE PRACTITIONER

## 2024-09-23 PROCEDURE — 3288F FALL RISK ASSESSMENT DOCD: CPT | Mod: HCNC,CPTII,S$GLB, | Performed by: NURSE PRACTITIONER

## 2024-09-23 PROCEDURE — 3074F SYST BP LT 130 MM HG: CPT | Mod: HCNC,CPTII,S$GLB, | Performed by: NURSE PRACTITIONER

## 2024-09-23 PROCEDURE — 99999 PR PBB SHADOW E&M-EST. PATIENT-LVL V: CPT | Mod: PBBFAC,HCNC,, | Performed by: NURSE PRACTITIONER

## 2024-09-23 PROCEDURE — 3078F DIAST BP <80 MM HG: CPT | Mod: HCNC,CPTII,S$GLB, | Performed by: NURSE PRACTITIONER

## 2024-09-23 PROCEDURE — 1101F PT FALLS ASSESS-DOCD LE1/YR: CPT | Mod: HCNC,CPTII,S$GLB, | Performed by: NURSE PRACTITIONER

## 2024-09-23 NOTE — PROGRESS NOTES
Assessment/Plan:  Problem List Items Addressed This Visit    None  Visit Diagnoses       Skin lesion    -  Primary    Breast cancer screening by mammogram        Relevant Orders    Mammo Digital Screening Bilat w/ Chirag        Recommend further evaluation by dermatology of skin lesion; follow up next week as scheduled   Update mammogram   Follow up if symptoms worsen or fail to improve.  ER precautions for severe or worsening symptoms.     Violeta Nicole, NP  _____________________________________________________________________________________________________________________________________________________    History of Present Illness    CHIEF COMPLAINT:  Ms. Adamson presents today for skin lesion.     She reports a growth on her right buttock area present for approximately 6 months that has been growing in size. The bump is tender to touch, mildly itchy. There is no pain. She denies known history of skin cancer. She has an upcoming dermatology appointment scheduled for October 4th with Dr. Mlulins's office.     She is due for mammogram and agree to scheduling.       Past Medical History:  Past Medical History:   Diagnosis Date    Acquired hypothyroidism 10/6/2021    Anemia 9/18/2023    Angina pectoris     Anticoagulant long-term use     CAD (coronary artery disease)     with stents x 3    GERD (gastroesophageal reflux disease)      Past Surgical History:   Procedure Laterality Date    2006 2 stents      2009 1 stent      CHOLECYSTECTOMY  2021    Dr. Roach    ESOPHAGOGASTRODUODENOSCOPY N/A 11/28/2018    Procedure: EGD (ESOPHAGOGASTRODUODENOSCOPY);  Surgeon: Marvin Alvarado MD;  Location: [facility];  Service: Endoscopy;  Laterality: N/A;    WRIST FRACTURE SURGERY  03/2018     Review of patient's allergies indicates:   Allergen Reactions    Sulfa (sulfonamide antibiotics) Shortness Of Breath     Social History     Tobacco Use    Smoking status: Never    Smokeless tobacco: Never   Substance Use Topics    Alcohol  use: No    Drug use: No     Family History   Problem Relation Name Age of Onset    Heart disease Father       Current Outpatient Medications on File Prior to Visit   Medication Sig Dispense Refill    ascorbic acid, vitamin C, (VITAMIN C) 500 MG tablet Take 500 mg by mouth once daily.      aspirin (ECOTRIN) 81 MG EC tablet Take 81 mg by mouth once daily.      atorvastatin (LIPITOR) 40 MG tablet TAKE 1 TABLET EVERY DAY 90 tablet 3    benzonatate (TESSALON) 100 MG capsule Take 1 capsule (100 mg total) by mouth 3 (three) times daily as needed for Cough. 30 capsule 0    calcium-vitamin D3 (OS-SHEILA 500 + D3) 500 mg(1,250mg) -200 unit per tablet Take 1 tablet by mouth 2 (two) times daily with meals.      carbamide peroxide (DEBROX) 6.5 % otic solution Place 5 drops into both ears 2 (two) times daily. 15 mL 0    diclofenac (VOLTAREN) 75 MG EC tablet Take 75 mg by mouth once daily.      famotidine (PEPCID) 20 MG tablet TAKE 1 TABLET TWICE DAILY AS NEEDED FOR HEARTBURN 180 tablet 0    fluticasone propionate (FLONASE) 50 mcg/actuation nasal spray SHAKE LIQUID AND USE 1 SPRAY(50 MCG) IN EACH NOSTRIL EVERY DAY 48 g 0    gabapentin (NEURONTIN) 300 MG capsule TAKE 3 CAPSULES EVERY EVENING 90 capsule 3    levothyroxine (SYNTHROID) 25 MCG tablet TAKE 1 TABLET EVERY DAY. REPEAT TSH IN 2 MONTHS 90 tablet 3    meclizine (ANTIVERT) 25 mg tablet Take 1 tablet (25 mg total) by mouth 3 (three) times daily as needed for Dizziness. 30 tablet 0    montelukast (SINGULAIR) 10 mg tablet TAKE 1 TABLET EVERY DAY 90 tablet 0    multivitamin (THERAGRAN) per tablet Take 1 tablet by mouth once daily.      nitroGLYCERIN (NITROSTAT) 0.4 MG SL tablet Place 0.4 mg under the tongue.      ondansetron (ZOFRAN) 8 MG tablet Take 1 tablet (8 mg total) by mouth every 8 (eight) hours as needed for Nausea. 10 tablet 0    pantoprazole (PROTONIX) 40 MG tablet TAKE 1 TABLET TWICE DAILY 180 tablet 10    traMADoL (ULTRAM) 50 mg tablet        No current  "facility-administered medications on file prior to visit.     Review of Systems   Constitutional:  Negative for appetite change, chills, fatigue and fever.   HENT:  Negative for congestion, rhinorrhea and sore throat.    Eyes:  Negative for visual disturbance.   Respiratory:  Negative for cough and shortness of breath.    Cardiovascular:  Negative for chest pain, palpitations and leg swelling.   Gastrointestinal:  Negative for abdominal pain, diarrhea and vomiting.   Genitourinary:  Negative for difficulty urinating, dysuria and hematuria.   Musculoskeletal:  Negative for arthralgias and myalgias.   Skin:  Negative for rash and wound.        +lesion   Neurological:  Negative for dizziness and headaches.   Psychiatric/Behavioral:  Negative for behavioral problems. The patient is not nervous/anxious.      Vitals:    09/23/24 0934   BP: 126/72   Pulse: 72   Resp: 16   Temp: 98.2 °F (36.8 °C)   TempSrc: Oral   SpO2: 98%   Weight: 81.7 kg (180 lb 1.6 oz)   Height: 5' 2" (1.575 m)     Wt Readings from Last 3 Encounters:   09/23/24 81.7 kg (180 lb 1.6 oz)   02/29/24 81.6 kg (180 lb)   11/08/23 82.1 kg (181 lb)     Physical Exam  Vitals reviewed.   Constitutional:       General: She is not in acute distress.     Appearance: Normal appearance. She is not ill-appearing.      Comments:  present   HENT:      Head: Normocephalic and atraumatic.      Right Ear: External ear normal.      Left Ear: External ear normal.      Nose: Nose normal.   Eyes:      Extraocular Movements: Extraocular movements intact.      Conjunctiva/sclera: Conjunctivae normal.   Cardiovascular:      Rate and Rhythm: Normal rate.      Heart sounds: Normal heart sounds.   Pulmonary:      Effort: Pulmonary effort is normal. No respiratory distress.      Breath sounds: Normal breath sounds.   Abdominal:      General: Abdomen is flat. There is no distension.   Musculoskeletal:         General: Normal range of motion.      Cervical back: Normal range of " motion.   Skin:     General: Skin is warm and dry.      Capillary Refill: Capillary refill takes less than 2 seconds.      Coloration: Skin is not pale.      Findings: Lesion (dime-sized growth to left upper buttock area- no redness, warmth, tenderness, drainage) present. No rash.   Neurological:      General: No focal deficit present.      Mental Status: She is alert and oriented to person, place, and time. Mental status is at baseline.   Psychiatric:         Mood and Affect: Mood normal.         Speech: Speech normal. Speech is not rapid and pressured, delayed or slurred.         Behavior: Behavior normal. Behavior is not agitated, slowed, aggressive, withdrawn, hyperactive or combative. Behavior is cooperative.         Thought Content: Thought content normal.         Judgment: Judgment normal.       Health Maintenance   Topic Date Due    TETANUS VACCINE  Never done    Mammogram  05/23/2024    Lipid Panel  09/18/2024    Aspirin/Antiplatelet Therapy  10/18/2024    High Dose Statin  12/28/2024    Colorectal Cancer Screening  08/14/2025    DEXA Scan  11/14/2025    Hepatitis C Screening  Completed    Shingles Vaccine  Completed     DISCLAIMER: This note was compiled by using a speech recognition dictation system and therefore please be aware that typographical / speech recognition errors can and do occur.  Please contact me if you see any errors specifically.  Consent was obtained for [employer] recording system prior to the visit.

## 2024-09-26 ENCOUNTER — HOSPITAL ENCOUNTER (OUTPATIENT)
Dept: RADIOLOGY | Facility: HOSPITAL | Age: 74
Discharge: HOME | End: 2024-09-26
Attending: NURSE PRACTITIONER
Payer: MEDICARE

## 2024-09-26 DIAGNOSIS — Z12.31 BREAST CANCER SCREENING BY MAMMOGRAM: ICD-10-CM

## 2024-09-26 PROCEDURE — 77067 SCR MAMMO BI INCL CAD: CPT | Mod: TC,HCNC,PO

## 2024-09-26 PROCEDURE — 77063 BREAST TOMOSYNTHESIS BI: CPT | Mod: TC,HCNC,PO

## 2024-10-15 DIAGNOSIS — E78.5 DYSLIPIDEMIA: ICD-10-CM

## 2024-10-16 RX ORDER — ATORVASTATIN CALCIUM 40 MG/1
TABLET, FILM COATED ORAL
Qty: 90 TABLET | Refills: 3 | Status: SHIPPED | OUTPATIENT
Start: 2024-10-16

## 2024-10-16 RX ORDER — PANTOPRAZOLE SODIUM 40 MG/1
TABLET, DELAYED RELEASE ORAL
Qty: 180 TABLET | Refills: 3 | Status: SHIPPED | OUTPATIENT
Start: 2024-10-16

## 2024-10-16 NOTE — TELEPHONE ENCOUNTER
Refill Routing Note   Medication(s) are not appropriate for processing by [facility] for the following reason(s):        Outside of protocol    [facility] action(s):  Route   Requires labs : Yes      Medication Therapy Plan: protonix 40mg twice a day dosing outside of protocol      Appointments  past 12m or future 3m with PCP    Date Provider   Last Visit   10/3/2023 Agustín Carter MD   Next Visit   Visit date not found Agustín Carter MD   ED visits in past 90 days: 0        Note composed:9:50 PM 10/15/2024

## 2024-10-16 NOTE — TELEPHONE ENCOUNTER
Care Due:                  Date            Visit Type   Department     Provider  --------------------------------------------------------------------------------                                EP -                              PRIMARY      [facility] FAMILY  Last Visit: 10-      CARE ([facility])   MEDICINE       Agustín Carter  Next Visit: None Scheduled  None         None Found                                                            Last  Test          Frequency    Reason                     Performed    Due Date  --------------------------------------------------------------------------------    Office Visit  15 months..  montelukast..............  10-   12-    [facility] Embedded Care Due Messages. Reference number: [id number].   10/15/2024 9:49:52 PM CDT

## 2024-10-29 ENCOUNTER — TELEPHONE (OUTPATIENT)
Dept: FAMILY MEDICINE | Facility: CLINIC | Age: 74
End: 2024-10-29
Payer: MEDICARE

## 2024-11-21 DIAGNOSIS — K21.00 GASTROESOPHAGEAL REFLUX DISEASE WITH ESOPHAGITIS WITHOUT HEMORRHAGE: ICD-10-CM

## 2024-11-21 RX ORDER — MONTELUKAST SODIUM 10 MG/1
TABLET ORAL
Qty: 90 TABLET | Refills: 0 | Status: SHIPPED | OUTPATIENT
Start: 2024-11-21

## 2024-11-21 RX ORDER — FAMOTIDINE 20 MG/1
TABLET, FILM COATED ORAL
Qty: 180 TABLET | Refills: 0 | Status: SHIPPED | OUTPATIENT
Start: 2024-11-21

## 2024-11-21 NOTE — TELEPHONE ENCOUNTER
Refill Routing Note   Medication(s) are not appropriate for processing by [facility] for the following reason(s):        Outside of protocol  Pepcid is prescribed for as needed use; ROUTE TO PCP    [facility] action(s):  Route  Approve               Appointments  past 12m or future 3m with PCP    Date Provider   Last Visit   10/3/2023 Agustín Carter MD   Next Visit   Visit date not found Agustín Carter MD   ED visits in past 90 days: 0        Note composed:11:27 AM 11/21/2024

## 2024-11-21 NOTE — TELEPHONE ENCOUNTER
No care due was identified.  Health [facility] Embedded Care Due Messages. Reference number: [id number].   11/21/2024 4:53:50 AM CST

## 2024-11-29 ENCOUNTER — LAB VISIT (OUTPATIENT)
Dept: LAB | Facility: HOSPITAL | Age: 74
End: 2024-11-29
Attending: FAMILY MEDICINE
Payer: MEDICARE

## 2024-11-29 DIAGNOSIS — Z13.6 ENCOUNTER FOR LIPID SCREENING FOR CARDIOVASCULAR DISEASE: ICD-10-CM

## 2024-11-29 DIAGNOSIS — Z13.220 ENCOUNTER FOR LIPID SCREENING FOR CARDIOVASCULAR DISEASE: ICD-10-CM

## 2024-11-29 DIAGNOSIS — Z79.899 ENCOUNTER FOR LONG-TERM (CURRENT) USE OF MEDICATIONS: ICD-10-CM

## 2024-11-29 DIAGNOSIS — Z00.01 ENCOUNTER FOR GENERAL ADULT MEDICAL EXAMINATION WITH ABNORMAL FINDINGS: ICD-10-CM

## 2024-11-29 LAB
ALBUMIN SERPL BCP-MCNC: 3.9 G/DL (ref 3.5–5.2)
ALP SERPL-CCNC: 107 U/L (ref 40–150)
ALT SERPL W/O P-5'-P-CCNC: 20 U/L (ref 10–44)
ANION GAP SERPL CALC-SCNC: 10 MMOL/L (ref 8–16)
AST SERPL-CCNC: 24 U/L (ref 10–40)
BASOPHILS # BLD AUTO: 0.05 K/UL (ref 0–0.2)
BASOPHILS NFR BLD: 0.7 % (ref 0–1.9)
BILIRUB SERPL-MCNC: 0.5 MG/DL (ref 0.1–1)
BUN SERPL-MCNC: 12 MG/DL (ref 8–23)
CALCIUM SERPL-MCNC: 9.7 MG/DL (ref 8.7–10.5)
CHLORIDE SERPL-SCNC: 108 MMOL/L (ref 95–110)
CHOLEST SERPL-MCNC: 142 MG/DL (ref 120–199)
CHOLEST/HDLC SERPL: 2.4 {RATIO} (ref 2–5)
CO2 SERPL-SCNC: 25 MMOL/L (ref 23–29)
CREAT SERPL-MCNC: 0.8 MG/DL (ref 0.5–1.4)
DIFFERENTIAL METHOD BLD: ABNORMAL
EOSINOPHIL # BLD AUTO: 0.3 K/UL (ref 0–0.5)
EOSINOPHIL NFR BLD: 3.6 % (ref 0–8)
ERYTHROCYTE [DISTWIDTH] IN BLOOD BY AUTOMATED COUNT: 11.9 % (ref 11.5–14.5)
EST. GFR  (NO RACE VARIABLE): >60 ML/MIN/1.73 M^2
ESTIMATED AVG GLUCOSE: 108 MG/DL (ref 68–131)
GLUCOSE SERPL-MCNC: 98 MG/DL (ref 70–110)
HBA1C MFR BLD: 5.4 % (ref 4–5.6)
HCT VFR BLD AUTO: 39.4 % (ref 37–48.5)
HDLC SERPL-MCNC: 58 MG/DL (ref 40–75)
HDLC SERPL: 40.8 % (ref 20–50)
HGB BLD-MCNC: 12.7 G/DL (ref 12–16)
IMM GRANULOCYTES # BLD AUTO: 0.02 K/UL (ref 0–0.04)
IMM GRANULOCYTES NFR BLD AUTO: 0.3 % (ref 0–0.5)
LDLC SERPL CALC-MCNC: 69.6 MG/DL (ref 63–159)
LYMPHOCYTES # BLD AUTO: 1.7 K/UL (ref 1–4.8)
LYMPHOCYTES NFR BLD: 23.4 % (ref 18–48)
MCH RBC QN AUTO: 31.5 PG (ref 27–31)
MCHC RBC AUTO-ENTMCNC: 32.2 G/DL (ref 32–36)
MCV RBC AUTO: 98 FL (ref 82–98)
MONOCYTES # BLD AUTO: 0.6 K/UL (ref 0.3–1)
MONOCYTES NFR BLD: 7.6 % (ref 4–15)
NEUTROPHILS # BLD AUTO: 4.7 K/UL (ref 1.8–7.7)
NEUTROPHILS NFR BLD: 64.4 % (ref 38–73)
NONHDLC SERPL-MCNC: 84 MG/DL
NRBC BLD-RTO: 0 /100 WBC
PLATELET # BLD AUTO: 173 K/UL (ref 150–450)
PMV BLD AUTO: 12.6 FL (ref 9.2–12.9)
POTASSIUM SERPL-SCNC: 4.6 MMOL/L (ref 3.5–5.1)
PROT SERPL-MCNC: 7.7 G/DL (ref 6–8.4)
RBC # BLD AUTO: 4.03 M/UL (ref 4–5.4)
SODIUM SERPL-SCNC: 143 MMOL/L (ref 136–145)
TRIGL SERPL-MCNC: 72 MG/DL (ref 30–150)
TSH SERPL DL<=0.005 MIU/L-ACNC: 2.93 UIU/ML (ref 0.4–4)
WBC # BLD AUTO: 7.27 K/UL (ref 3.9–12.7)

## 2024-11-29 PROCEDURE — 36415 COLL VENOUS BLD VENIPUNCTURE: CPT | Mod: HCNC,PO | Performed by: FAMILY MEDICINE

## 2024-11-29 PROCEDURE — 83036 HEMOGLOBIN GLYCOSYLATED A1C: CPT | Mod: HCNC | Performed by: FAMILY MEDICINE

## 2024-11-29 PROCEDURE — 84443 ASSAY THYROID STIM HORMONE: CPT | Mod: HCNC | Performed by: FAMILY MEDICINE

## 2024-11-29 PROCEDURE — 85025 COMPLETE CBC W/AUTO DIFF WBC: CPT | Mod: HCNC | Performed by: FAMILY MEDICINE

## 2024-11-29 PROCEDURE — 80053 COMPREHEN METABOLIC PANEL: CPT | Mod: HCNC | Performed by: FAMILY MEDICINE

## 2024-11-29 PROCEDURE — 80061 LIPID PANEL: CPT | Mod: HCNC | Performed by: FAMILY MEDICINE

## 2024-11-30 NOTE — PROGRESS NOTES
Make follow-up lab appointment per recommendation below.  Check to see if patient has seen the results through my chart.  If not then,  #CALL THE PATIENT# to discuss results/see if they have questions and document verification of contact. Make F/U appt if needed. [phone]    #My interpretation that was sent to them through [employer]:  Coretta, I have reviewed your recent blood work.     Your complete blood count is normal.    Your metabolic panel which shows your glucose, kidney function, electrolytes, and liver function is normal.   Thyroid study is normal.   Your cholesterol is stable.    Your hemoglobin A1c is normal.  This test is gold standard screening test for diabetes.  It is a measures 3 months of your average blood sugar.  =========================  Also please address any outstanding health maintenance that may be due: TETANUS VACCINE Never done

## 2024-12-03 ENCOUNTER — OFFICE VISIT (OUTPATIENT)
Dept: FAMILY MEDICINE | Facility: CLINIC | Age: 74
End: 2024-12-03
Payer: MEDICARE

## 2024-12-03 VITALS
TEMPERATURE: 98 F | DIASTOLIC BLOOD PRESSURE: 64 MMHG | BODY MASS INDEX: 32.2 KG/M2 | HEIGHT: 62 IN | WEIGHT: 175 LBS | HEART RATE: 72 BPM | SYSTOLIC BLOOD PRESSURE: 142 MMHG

## 2024-12-03 DIAGNOSIS — I25.10 ATHEROSCLEROSIS OF NATIVE CORONARY ARTERY WITHOUT ANGINA PECTORIS, UNSPECIFIED WHETHER NATIVE OR TRANSPLANTED HEART: ICD-10-CM

## 2024-12-03 DIAGNOSIS — Z00.00 ANNUAL PHYSICAL EXAM: Primary | ICD-10-CM

## 2024-12-03 DIAGNOSIS — Z76.0 MEDICATION REFILL: ICD-10-CM

## 2024-12-03 DIAGNOSIS — E78.5 DYSLIPIDEMIA: Chronic | ICD-10-CM

## 2024-12-03 DIAGNOSIS — E03.9 ACQUIRED HYPOTHYROIDISM: Chronic | ICD-10-CM

## 2024-12-03 DIAGNOSIS — M35.3 POLYMYALGIA RHEUMATICA: ICD-10-CM

## 2024-12-03 DIAGNOSIS — K21.9 GASTROESOPHAGEAL REFLUX DISEASE, UNSPECIFIED WHETHER ESOPHAGITIS PRESENT: ICD-10-CM

## 2024-12-03 DIAGNOSIS — M79.662 PAIN OF LEFT LOWER LEG: ICD-10-CM

## 2024-12-03 DIAGNOSIS — Z78.0 ASYMPTOMATIC MENOPAUSAL STATE: ICD-10-CM

## 2024-12-03 DIAGNOSIS — M85.80 OSTEOPENIA, UNSPECIFIED LOCATION: ICD-10-CM

## 2024-12-03 PROCEDURE — 3077F SYST BP >= 140 MM HG: CPT | Mod: CPTII,S$GLB,, | Performed by: NURSE PRACTITIONER

## 2024-12-03 PROCEDURE — 1126F AMNT PAIN NOTED NONE PRSNT: CPT | Mod: CPTII,S$GLB,, | Performed by: NURSE PRACTITIONER

## 2024-12-03 PROCEDURE — 1101F PT FALLS ASSESS-DOCD LE1/YR: CPT | Mod: CPTII,S$GLB,, | Performed by: NURSE PRACTITIONER

## 2024-12-03 PROCEDURE — 1160F RVW MEDS BY RX/DR IN RCRD: CPT | Mod: CPTII,S$GLB,, | Performed by: NURSE PRACTITIONER

## 2024-12-03 PROCEDURE — 99999 PR PBB SHADOW E&M-EST. PATIENT-LVL V: CPT | Mod: PBBFAC,HCNC,, | Performed by: NURSE PRACTITIONER

## 2024-12-03 PROCEDURE — 3288F FALL RISK ASSESSMENT DOCD: CPT | Mod: CPTII,S$GLB,, | Performed by: NURSE PRACTITIONER

## 2024-12-03 PROCEDURE — 3078F DIAST BP <80 MM HG: CPT | Mod: CPTII,S$GLB,, | Performed by: NURSE PRACTITIONER

## 2024-12-03 PROCEDURE — 99397 PER PM REEVAL EST PAT 65+ YR: CPT | Mod: S$GLB,,, | Performed by: NURSE PRACTITIONER

## 2024-12-03 PROCEDURE — 1159F MED LIST DOCD IN RCRD: CPT | Mod: CPTII,S$GLB,, | Performed by: NURSE PRACTITIONER

## 2024-12-03 RX ORDER — FLUTICASONE PROPIONATE 50 MCG
1 SPRAY, SUSPENSION (ML) NASAL DAILY
Qty: 48 G | Refills: 3 | Status: SHIPPED | OUTPATIENT
Start: 2024-12-03

## 2024-12-03 NOTE — PROGRESS NOTES
"Subjective     Patient ID: Flores Adamson is a 74 y.o. female.    Chief Complaint: Annual Exam  Pt in today for annual exam. Dyslipidemia, atherosclerosis managed with medication; sees cardiology; history of stent placement x 3 to coronary arteries, history of angina. Pt states no longer seeing rheumatology for polymyalgia rheumatica. Hypothyroidism, GERD managed with medication. Pt currently os latonya ofr osteopenia; DEXA due. Labs UTD.   Leg Pain   The incident occurred more than 1 week ago (> 2 m; left lower extremity). There was no injury mechanism. Pain location: LLE. The pain is mild. The pain has been Intermittent since onset. Associated symptoms include numbness. Pertinent negatives include no inability to bear weight, loss of motion, loss of sensation, muscle weakness or tingling. She reports no foreign bodies present. Nothing aggravates the symptoms. She has tried nothing (Pt currently has gabapentin prescribed; states uncertain if taking) for the symptoms. The treatment provided no relief (Pt states, "I just want to make sure it's not my veins.").     Past Medical History:   Diagnosis Date    Acquired hypothyroidism 10/6/2021    Anemia 9/18/2023    Angina pectoris     Anticoagulant long-term use     CAD (coronary artery disease)     with stents x 3    GERD (gastroesophageal reflux disease)      Social History     Socioeconomic History    Marital status:     Number of children: 3   Tobacco Use    Smoking status: Never    Smokeless tobacco: Never   Substance and Sexual Activity    Alcohol use: No    Drug use: No   Social History Narrative    ** Merged History Encounter **          Social Drivers of Health     Financial Resource Strain: Low Risk  (12/2/2024)    Overall Financial Resource Strain (CARDIA)     Difficulty of Paying Living Expenses: Not hard at all   Food Insecurity: No Food Insecurity (12/2/2024)    Hunger Vital Sign     Worried About Running Out of Food in the Last Year: Never true     " Ran Out of Food in the Last Year: Never true   Transportation Needs: No Transportation Needs (2/29/2024)    PRAPARE - Transportation     Lack of Transportation (Medical): No     Lack of Transportation (Non-Medical): No   Physical Activity: Insufficiently Active (12/2/2024)    Exercise Vital Sign     Days of Exercise per Week: 4 days     Minutes of Exercise per Session: 10 min   Stress: No Stress Concern Present (12/2/2024)    [employer] [place] [employer] - Occupational Stress Questionnaire     Feeling of Stress : Not at all   Housing Stability: Unknown (2/29/2024)    Housing Stability Vital Sign     Unable to Pay for Housing in the Last Year: No     Unstable Housing in the Last Year: No     Past Surgical History:   Procedure Laterality Date    2006 2 stents      2009 1 stent      CHOLECYSTECTOMY  2021    Dr. Roach    ESOPHAGOGASTRODUODENOSCOPY N/A 11/28/2018    Procedure: EGD (ESOPHAGOGASTRODUODENOSCOPY);  Surgeon: Marvin Alvarado MD;  Location: [facility];  Service: Endoscopy;  Laterality: N/A;    WRIST FRACTURE SURGERY  03/2018       Review of Systems   Constitutional: Negative.    HENT: Negative.     Eyes: Negative.    Respiratory: Negative.     Cardiovascular: Negative.    Gastrointestinal: Negative.    Endocrine: Negative.    Genitourinary: Negative.    Musculoskeletal:  Positive for arthralgias (left lower extremity) and leg pain.   Integumentary:  Negative.   Allergic/Immunologic: Negative.    Neurological:  Positive for numbness. Negative for tingling.   Psychiatric/Behavioral: Negative.            Objective     Physical Exam  Vitals and nursing note reviewed.   Constitutional:       Appearance: Normal appearance.   HENT:      Head: Normocephalic.      Right Ear: Tympanic membrane, ear canal and external ear normal.      Left Ear: Tympanic membrane, ear canal and external ear normal.      Nose: Nose normal.      Mouth/Throat:      Mouth: Mucous membranes are moist.      Pharynx: Oropharynx is  "clear.   Eyes:      Conjunctiva/sclera: Conjunctivae normal.      Pupils: Pupils are equal, round, and reactive to light.   Cardiovascular:      Rate and Rhythm: Normal rate and regular rhythm.      Pulses: Normal pulses.      Heart sounds: Normal heart sounds.   Pulmonary:      Effort: Pulmonary effort is normal.      Breath sounds: Normal breath sounds.   Abdominal:      General: Bowel sounds are normal.      Palpations: Abdomen is soft.   Musculoskeletal:         General: Normal range of motion.      Cervical back: Normal range of motion and neck supple.      Left upper leg: No swelling, edema, deformity, lacerations, tenderness or bony tenderness.      Left lower leg: No swelling, deformity, lacerations, tenderness or bony tenderness. No edema.   Skin:     General: Skin is dry.      Capillary Refill: Capillary refill takes 2 to 3 seconds.   Neurological:      Mental Status: She is alert and oriented to person, place, and time.   Psychiatric:         Mood and Affect: Mood normal.         Behavior: Behavior normal.         Thought Content: Thought content normal.         Judgment: Judgment normal.            Assessment and Plan   Flores Krause" was seen today for annual exam.    Diagnoses and all orders for this visit:    Annual physical exam  Atherosclerosis of native coronary artery without angina pectoris, unspecified whether native or transplanted heart  Dyslipidemia  Polymyalgia rheumatica  Acquired hypothyroidism  Gastroesophageal reflux disease, unspecified whether esophagitis present  Osteopenia, unspecified location  Asymptomatic menopausal state  Medication refill  -     fluticasone propionate (FLONASE) 50 mcg/actuation nasal spray; 1 spray (50 mcg total) by Each Nostril route once daily.        -     DXA Bone Density Axial Skeleton 1 or more sites; Future  Labs UTD (CBC, CMP, TSH, lipid)    Pain of left lower leg  -     US Lower Extremity Veins Left; Future    Continue current plan of care  RTC as " needed  Report to ER immediately if symptoms worsen or persist           No follow-ups on file.

## 2024-12-05 ENCOUNTER — HOSPITAL ENCOUNTER (OUTPATIENT)
Dept: RADIOLOGY | Facility: HOSPITAL | Age: 74
Discharge: HOME | End: 2024-12-05
Attending: NURSE PRACTITIONER
Payer: MEDICARE

## 2024-12-05 DIAGNOSIS — Z78.0 ASYMPTOMATIC MENOPAUSAL STATE: ICD-10-CM

## 2024-12-05 DIAGNOSIS — M79.662 PAIN OF LEFT LOWER LEG: ICD-10-CM

## 2024-12-05 DIAGNOSIS — Z00.00 ANNUAL PHYSICAL EXAM: ICD-10-CM

## 2024-12-05 DIAGNOSIS — M85.80 OSTEOPENIA, UNSPECIFIED LOCATION: ICD-10-CM

## 2024-12-05 PROCEDURE — 93971 EXTREMITY STUDY: CPT | Mod: TC,PO,LT

## 2024-12-05 PROCEDURE — 77080 DXA BONE DENSITY AXIAL: CPT | Mod: 26,,, | Performed by: RADIOLOGY

## 2024-12-05 PROCEDURE — 77080 DXA BONE DENSITY AXIAL: CPT | Mod: TC,PO

## 2024-12-05 PROCEDURE — 93971 EXTREMITY STUDY: CPT | Mod: 26,LT,, | Performed by: RADIOLOGY

## 2024-12-27 DIAGNOSIS — E03.9 ACQUIRED HYPOTHYROIDISM: ICD-10-CM

## 2024-12-27 RX ORDER — LEVOTHYROXINE SODIUM 25 UG/1
TABLET ORAL
Qty: 90 TABLET | Refills: 0 | Status: SHIPPED | OUTPATIENT
Start: 2024-12-27

## 2024-12-27 NOTE — TELEPHONE ENCOUNTER
Care Due:                  Date            Visit Type   Department     Provider  --------------------------------------------------------------------------------                                EP -                              PRIMARY      [facility] FAMILY  Last Visit: 10-      CARE ([facility])   MEDICINE       Agustín Carter  Next Visit: None Scheduled  None         None Found                                                            Last  Test          Frequency    Reason                     Performed    Due Date  --------------------------------------------------------------------------------    Office Visit  15 months..  montelukast..............  10-   12-    Health [facility] Embedded Care Due Messages. Reference number: [id number].   12/27/2024 1:48:47 AM CST

## 2024-12-27 NOTE — TELEPHONE ENCOUNTER
Refill Routing Note   Medication(s) are not appropriate for processing by [facility] for the following reason(s):        Patient not seen by provider within 15 months    [facility] action(s):  Defer   Requires appointment : Yes             Appointments  past 12m or future 3m with PCP    Date Provider   Last Visit   10/3/2023 Agustín Carter MD   Next Visit   Visit date not found Agustín Carter MD   ED visits in past 90 days: 0        Note composed:9:43 AM 12/27/2024

## 2025-01-09 ENCOUNTER — TELEPHONE (OUTPATIENT)
Dept: CARDIOLOGY | Facility: CLINIC | Age: 75
End: 2025-01-09
Payer: MEDICARE

## 2025-02-03 DIAGNOSIS — K21.00 GASTROESOPHAGEAL REFLUX DISEASE WITH ESOPHAGITIS WITHOUT HEMORRHAGE: ICD-10-CM

## 2025-02-03 RX ORDER — MONTELUKAST SODIUM 10 MG/1
TABLET ORAL
Qty: 90 TABLET | Refills: 0 | Status: SHIPPED | OUTPATIENT
Start: 2025-02-03

## 2025-02-03 RX ORDER — FAMOTIDINE 20 MG/1
TABLET, FILM COATED ORAL
Qty: 180 TABLET | Refills: 0 | Status: SHIPPED | OUTPATIENT
Start: 2025-02-03

## 2025-02-03 NOTE — TELEPHONE ENCOUNTER
Refill Routing Note   Medication(s) are not appropriate for processing by [facility] for the following reason(s):        Patient not seen by provider within 15 months    [facility] action(s):  Defer             Appointments  past 12m or future 3m with PCP    Date Provider   Last Visit   10/3/2023 Agustín Carter MD   Next Visit   Visit date not found Agustín Carter MD   ED visits in past 90 days: 0        Note composed:11:14 AM 02/03/2025

## 2025-02-03 NOTE — TELEPHONE ENCOUNTER
No care due was identified.  [facility] Embedded Care Due Messages. Reference number: [id number].   2/03/2025 2:11:33 PM CST

## 2025-02-03 NOTE — TELEPHONE ENCOUNTER
No care due was identified.  Health [facility] Embedded Care Due Messages. Reference number: [id number].   2/03/2025 1:38:12 AM CST

## 2025-02-03 NOTE — TELEPHONE ENCOUNTER
Refill Routing Note   Medication(s) are not appropriate for processing by [facility] for the following reason(s):        Patient not seen by provider within 15 months    [facility] action(s):  Defer               Appointments  past 12m or future 3m with PCP    Date Provider   Last Visit   10/3/2023 Agustín Carter MD   Next Visit   2/3/2025 Agustín Carter MD   ED visits in past 90 days: 0        Note composed:2:11 PM 02/03/2025

## 2025-03-10 NOTE — TELEPHONE ENCOUNTER
No care due was identified.  Health [facility] Embedded Care Due Messages. Reference number: [id number].   3/10/2025 2:15:13 PM CDT

## 2025-03-11 ENCOUNTER — TELEPHONE (OUTPATIENT)
Dept: ADMINISTRATIVE | Facility: CLINIC | Age: 75
End: 2025-03-11
Payer: MEDICARE

## 2025-03-11 RX ORDER — GABAPENTIN 300 MG/1
900 CAPSULE ORAL NIGHTLY
Qty: 90 CAPSULE | Refills: 11 | Status: SHIPPED | OUTPATIENT
Start: 2025-03-11

## 2025-03-11 NOTE — TELEPHONE ENCOUNTER
Refill Routing Note   Medication(s) are not appropriate for processing by [facility] for the following reason(s):        Outside of protocol    [facility] action(s):  Route               Appointments  past 12m or future 3m with PCP    Date Provider   Last Visit   10/3/2023 Agustín Carter MD   Next Visit   Visit date not found Agustín Carter MD   ED visits in past 90 days: 0        Note composed:2:09 AM 03/11/2025

## 2025-03-18 ENCOUNTER — OFFICE VISIT (OUTPATIENT)
Dept: HOME HEALTH SERVICES | Facility: CLINIC | Age: 75
End: 2025-03-18
Payer: MEDICARE

## 2025-03-18 VITALS
WEIGHT: 175 LBS | DIASTOLIC BLOOD PRESSURE: 81 MMHG | HEIGHT: 62 IN | OXYGEN SATURATION: 98 % | BODY MASS INDEX: 32.2 KG/M2 | HEART RATE: 73 BPM | SYSTOLIC BLOOD PRESSURE: 143 MMHG

## 2025-03-18 DIAGNOSIS — M85.80 OSTEOPENIA, UNSPECIFIED LOCATION: ICD-10-CM

## 2025-03-18 DIAGNOSIS — E78.5 DYSLIPIDEMIA: Chronic | ICD-10-CM

## 2025-03-18 DIAGNOSIS — K21.9 GASTROESOPHAGEAL REFLUX DISEASE, UNSPECIFIED WHETHER ESOPHAGITIS PRESENT: ICD-10-CM

## 2025-03-18 DIAGNOSIS — Z00.00 ENCOUNTER FOR MEDICARE ANNUAL WELLNESS EXAM: Primary | ICD-10-CM

## 2025-03-18 DIAGNOSIS — E03.9 ACQUIRED HYPOTHYROIDISM: Chronic | ICD-10-CM

## 2025-03-18 DIAGNOSIS — I70.0 AORTIC ATHEROSCLEROSIS: ICD-10-CM

## 2025-03-18 DIAGNOSIS — I25.10 ATHEROSCLEROSIS OF NATIVE CORONARY ARTERY WITHOUT ANGINA PECTORIS, UNSPECIFIED WHETHER NATIVE OR TRANSPLANTED HEART: ICD-10-CM

## 2025-03-18 RX ORDER — DEXTROMETHORPHAN HYDROBROMIDE, GUAIFENESIN 5; 100 MG/5ML; MG/5ML
650 LIQUID ORAL EVERY 8 HOURS
COMMUNITY

## 2025-03-23 PROBLEM — S52.532D CLOSED COLLES' FRACTURE OF LEFT RADIUS WITH ROUTINE HEALING: Status: RESOLVED | Noted: 2018-03-13 | Resolved: 2025-03-23

## 2025-03-23 PROBLEM — S62.102A CLOSED FRACTURE OF LEFT WRIST: Status: RESOLVED | Noted: 2018-03-13 | Resolved: 2025-03-23

## 2025-03-23 PROBLEM — U07.1 COVID: Status: RESOLVED | Noted: 2022-06-11 | Resolved: 2025-03-23

## 2025-03-23 NOTE — PATIENT INSTRUCTIONS
Counseling and Referral of Other Preventative  (Italic type indicates deductible and co-insurance are waived)    Patient Name: Flores Adamson  Today's Date: 3/23/2025    Health Maintenance       Date Due Completion Date    TETANUS VACCINE Never done ---    Mammogram 09/26/2025 9/26/2024    Override on 8/10/2016: Done ([place].  BI-RADS 1 - Negative.)    Lipid Panel 11/29/2025 11/29/2024    Aspirin/Antiplatelet Therapy 03/18/2026 3/18/2025    DEXA Scan 12/05/2027 12/5/2024        No orders of the defined types were placed in this encounter.    The following information is provided to all patients.  This information is to help you find resources for any of the problems found today that may be affecting your health:                  Living healthy guide: www.[facility].gov      Understanding Diabetes: www.diabetes.org      Eating healthy: www.cdc.gov/healthyweight      [facility] home safety checklist: www.cdc.gov/steadi/patient.html      Agency on Aging: www.goea.[place].[facility]      Alcoholics anonymous (AA): www.aa.org      Physical Activity: www.kaye.nih.gov/gg2wmmb      Tobacco use: www.quitwithusla.org

## 2025-03-23 NOTE — PROGRESS NOTES
"  Flores Adamson presented for a follow-up Medicare AWV today. The following components were reviewed and updated:    Medical history  Family History  Social history  Allergies and Current Medications  Health Risk Assessment  Health Maintenance  Care Team    **See Completed Assessments for Annual Wellness visit with in the encounter summary    The following assessments were completed:  Depression Screening  Cognitive function Screening  Timed Get Up Test  Whisper Test      Opioid documentation:      Patient does not have a current opioid prescription.          Vitals:    03/18/25 1107 03/18/25 1124   BP: (!) 148/84 (!) 143/81   BP Location: Left arm    Patient Position: Sitting    Pulse: 73    SpO2: 98%    Weight: 79.4 kg (175 lb)    Height: 5' 2" (1.575 m)      Body mass index is 32.01 kg/m².       Physical Exam  Constitutional:       Appearance: Normal appearance.   HENT:      Head: Normocephalic and atraumatic.      Nose: Nose normal.      Mouth/Throat:      Mouth: Mucous membranes are moist.   Eyes:      Extraocular Movements: Extraocular movements intact.      Pupils: Pupils are equal, round, and reactive to light.   Cardiovascular:      Rate and Rhythm: Normal rate and regular rhythm.   Pulmonary:      Effort: Pulmonary effort is normal.      Breath sounds: Normal breath sounds.   Abdominal:      General: Bowel sounds are normal.      Palpations: Abdomen is soft.   Musculoskeletal:         General: Normal range of motion.      Cervical back: Normal range of motion and neck supple.   Skin:     General: Skin is warm and dry.   Neurological:      General: No focal deficit present.      Mental Status: She is alert and oriented to person, place, and time.   Psychiatric:         Mood and Affect: Mood normal.         Behavior: Behavior normal.           Diagnoses and health risks identified today and associated recommendations/orders:  1. Encounter for Medicare annual wellness exam  Awv completed      2. Aortic " atherosclerosis  On asa and statin - followed with PCP      3. Atherosclerosis of native coronary artery without angina pectoris, unspecified whether native or transplanted heart  On asa, statin and follwoed by PCP    4. Dyslipidemia  On statin - followed with labs by cardiology      5. Acquired hypothyroidism  On replacement - followed with labs by PCP    6. Gastroesophageal reflux disease, unspecified whether esophagitis present  On pepcid - stable - followed with PCP      7. Osteopenia, unspecified location  On vit d replacement - did have fractured wrist - followed by PCP        Provided Flores with a 5-10 year written screening schedule and personal prevention plan. Recommendations were developed using the USPSTF age appropriate recommendations. Education, counseling, and referrals were provided as needed.  After Visit Summary printed and given to patient which includes a list of additional screenings\tests needed.    Fu in 1 yr for frieda Vila, ERON, APRN

## 2025-04-03 ENCOUNTER — TELEPHONE (OUTPATIENT)
Dept: FAMILY MEDICINE | Facility: CLINIC | Age: 75
End: 2025-04-03
Payer: MEDICARE

## 2025-04-03 ENCOUNTER — TELEPHONE (OUTPATIENT)
Dept: GASTROENTEROLOGY | Facility: CLINIC | Age: 75
End: 2025-04-03
Payer: MEDICARE

## 2025-04-03 NOTE — TELEPHONE ENCOUNTER
----- Message from Nicolasa sent at 4/3/2025  1:49 PM CDT -----  Contact: Flores  Type: Appointment RequestCaller is requesting an appointment.  Name of Caller:Flores Symptoms:pt would like to see specifically an MD because she has acid reflux and difficulty swallowing Would the patient rather a call back or a response [facility]? [facility] Back Number:[phone] Information:

## 2025-04-03 NOTE — TELEPHONE ENCOUNTER
----- Message from Joana sent at 4/3/2025  2:42 PM CDT -----  Name of Caller:Yolis Adamson Symptoms:constipationBest Call Back Number:.[phone]  Additional Information: Patient would like an order put  in for a colonoscopy please. Thx.EL

## 2025-04-03 NOTE — TELEPHONE ENCOUNTER
----- Message from [employer] sent at 4/3/2025 10:47 AM CDT -----  Contact: CRYS CORCORAN [[id number]]  ..Type:  Patient Requesting CallWho Called:CRYS CORCORAN [[id number]]Would the patient rather a call back or a response via [facility]? CallBest Call Back Number:.[phone] (home) Additional Information: Patient would like to schedule an appointment for GERD

## 2025-04-04 ENCOUNTER — LAB VISIT (OUTPATIENT)
Dept: LAB | Facility: HOSPITAL | Age: 75
End: 2025-04-04
Attending: NURSE PRACTITIONER
Payer: MEDICARE

## 2025-04-04 ENCOUNTER — OFFICE VISIT (OUTPATIENT)
Dept: FAMILY MEDICINE | Facility: CLINIC | Age: 75
End: 2025-04-04
Payer: MEDICARE

## 2025-04-04 VITALS
BODY MASS INDEX: 32.63 KG/M2 | TEMPERATURE: 97 F | WEIGHT: 177.31 LBS | SYSTOLIC BLOOD PRESSURE: 129 MMHG | HEIGHT: 62 IN | HEART RATE: 73 BPM | DIASTOLIC BLOOD PRESSURE: 75 MMHG | OXYGEN SATURATION: 98 %

## 2025-04-04 DIAGNOSIS — K21.9 GASTROESOPHAGEAL REFLUX DISEASE, UNSPECIFIED WHETHER ESOPHAGITIS PRESENT: Primary | ICD-10-CM

## 2025-04-04 DIAGNOSIS — K21.9 GASTROESOPHAGEAL REFLUX DISEASE, UNSPECIFIED WHETHER ESOPHAGITIS PRESENT: ICD-10-CM

## 2025-04-04 DIAGNOSIS — K64.9 HEMORRHOIDS, UNSPECIFIED HEMORRHOID TYPE: ICD-10-CM

## 2025-04-04 PROCEDURE — 3288F FALL RISK ASSESSMENT DOCD: CPT | Mod: HCNC,CPTII,S$GLB, | Performed by: NURSE PRACTITIONER

## 2025-04-04 PROCEDURE — 1126F AMNT PAIN NOTED NONE PRSNT: CPT | Mod: HCNC,CPTII,S$GLB, | Performed by: NURSE PRACTITIONER

## 2025-04-04 PROCEDURE — 99213 OFFICE O/P EST LOW 20 MIN: CPT | Mod: HCNC,S$GLB,, | Performed by: NURSE PRACTITIONER

## 2025-04-04 PROCEDURE — 3074F SYST BP LT 130 MM HG: CPT | Mod: HCNC,CPTII,S$GLB, | Performed by: NURSE PRACTITIONER

## 2025-04-04 PROCEDURE — 1101F PT FALLS ASSESS-DOCD LE1/YR: CPT | Mod: HCNC,CPTII,S$GLB, | Performed by: NURSE PRACTITIONER

## 2025-04-04 PROCEDURE — 1160F RVW MEDS BY RX/DR IN RCRD: CPT | Mod: HCNC,CPTII,S$GLB, | Performed by: NURSE PRACTITIONER

## 2025-04-04 PROCEDURE — 1159F MED LIST DOCD IN RCRD: CPT | Mod: HCNC,CPTII,S$GLB, | Performed by: NURSE PRACTITIONER

## 2025-04-04 PROCEDURE — 87338 HPYLORI STOOL AG IA: CPT | Mod: HCNC

## 2025-04-04 PROCEDURE — 99999 PR PBB SHADOW E&M-EST. PATIENT-LVL V: CPT | Mod: PBBFAC,HCNC,, | Performed by: NURSE PRACTITIONER

## 2025-04-04 PROCEDURE — 3078F DIAST BP <80 MM HG: CPT | Mod: HCNC,CPTII,S$GLB, | Performed by: NURSE PRACTITIONER

## 2025-04-04 RX ORDER — SUCRALFATE 1 G/1
1 TABLET ORAL 4 TIMES DAILY
Qty: 40 TABLET | Refills: 0 | Status: SHIPPED | OUTPATIENT
Start: 2025-04-04 | End: 2025-04-14

## 2025-04-04 RX ORDER — HYDROCORTISONE ACETATE 25 MG/1
25 SUPPOSITORY RECTAL 2 TIMES DAILY
Qty: 20 SUPPOSITORY | Refills: 0 | Status: SHIPPED | OUTPATIENT
Start: 2025-04-04 | End: 2025-04-14

## 2025-04-04 NOTE — PATIENT INSTRUCTIONS
Hold pepcid, while taking carafate  Avoid GERD inducing foods, beverages  Hydrate well  Rest  Consider gen surgery for hemorrhoids  Report to ER immediately if symptoms worsen or persist    Chris Tanner,     If you are due for any health screening(s) below please notify me so we can arrange them to be ordered and scheduled. Most healthy patients at your age complete them, but you are free to accept or refuse.     If you can't do it, I'll definitely understand. If you can, I'd certainly appreciate it!    All of your core healthy metrics are met.

## 2025-04-05 NOTE — PROGRESS NOTES
Subjective     Patient ID: Flores Adamson is a 74 y.o. female.    Chief Complaint: Gastroesophageal Reflux    Gastroesophageal Reflux  She complains of heartburn. She reports no abdominal pain, no belching, no chest pain, no choking, no coughing, no dysphagia, no early satiety, no globus sensation, no hoarse voice, no nausea, no sore throat, no stridor, no tooth decay, no water brash or no wheezing. This is a chronic problem. The current episode started more than 1 year ago. The problem occurs frequently. The problem has been gradually worsening. The heartburn duration is several minutes. The heartburn is located in the substernum. The heartburn is of moderate intensity. The heartburn wakes her from sleep. The heartburn limits her activity. The heartburn changes with position. The symptoms are aggravated by lying down and certain foods. Pertinent negatives include no anemia, fatigue, melena, muscle weakness, orthopnea or weight loss. Risk factors include obesity. She has tried a PPI and a histamine-2 antagonist for the symptoms. The treatment provided mild relief. Past procedures include an EGD. Past procedures do not include an abdominal ultrasound, esophageal manometry, esophageal pH monitoring, H. pylori antibody titer or a UGI. Past invasive treatments do not include gastroplasty, gastroplication or reflux surgery.   Pt also c/o hemorrhoid flare; states has had in the past;states mild bleeding at times; declines general surgery referral at this time; requesting suppository.  Past Medical History:   Diagnosis Date    Acquired hypothyroidism 10/6/2021    Anemia 9/18/2023    Angina pectoris     Anticoagulant long-term use     CAD (coronary artery disease)     with stents x 3    GERD (gastroesophageal reflux disease)      Social History[1]  Past Surgical History:   Procedure Laterality Date    2006 2 stents      2009 1 stent      CHOLECYSTECTOMY  2021    Dr. Roach    ESOPHAGOGASTRODUODENOSCOPY N/A 11/28/2018     Procedure: EGD (ESOPHAGOGASTRODUODENOSCOPY);  Surgeon: Marvin Alvarado MD;  Location: [facility];  Service: Endoscopy;  Laterality: N/A;    WRIST FRACTURE SURGERY  03/2018       Review of Systems   Constitutional: Negative.  Negative for fatigue and weight loss.   HENT: Negative.  Negative for hoarse voice and sore throat.    Eyes: Negative.    Respiratory:  Negative for cough, choking and wheezing.    Cardiovascular: Negative.  Negative for chest pain.   Gastrointestinal:  Positive for heartburn and reflux. Negative for abdominal pain, dysphagia, melena and nausea.        Hemorrhoids   Endocrine: Negative.    Genitourinary: Negative.    Musculoskeletal: Negative.  Negative for muscle weakness.   Integumentary:  Negative.   Allergic/Immunologic: Negative.    Neurological: Negative.    Psychiatric/Behavioral: Negative.            Objective     Physical Exam  Vitals and nursing note reviewed.   Constitutional:       Appearance: Normal appearance.   HENT:      Head: Normocephalic.      Right Ear: Tympanic membrane, ear canal and external ear normal.      Left Ear: Tympanic membrane, ear canal and external ear normal.      Nose: Nose normal.      Mouth/Throat:      Mouth: Mucous membranes are moist.      Pharynx: Oropharynx is clear.   Eyes:      Conjunctiva/sclera: Conjunctivae normal.      Pupils: Pupils are equal, round, and reactive to light.   Cardiovascular:      Rate and Rhythm: Normal rate and regular rhythm.      Pulses: Normal pulses.      Heart sounds: Normal heart sounds.   Pulmonary:      Effort: Pulmonary effort is normal.      Breath sounds: Normal breath sounds.   Abdominal:      General: Bowel sounds are normal.      Palpations: Abdomen is soft.   Musculoskeletal:         General: Normal range of motion.      Cervical back: Normal range of motion and neck supple.   Skin:     General: Skin is warm and dry.      Capillary Refill: Capillary refill takes 2 to 3 seconds.   Neurological:      Mental  Status: She is alert and oriented to person, place, and time.   Psychiatric:         Mood and Affect: Mood normal.         Behavior: Behavior normal.         Thought Content: Thought content normal.         Judgment: Judgment normal.            Assessment and Plan     1. Gastroesophageal reflux disease, unspecified whether esophagitis present  -     H. pylori antigen, stool; Future; Expected date: 04/04/2025  -     Ambulatory referral/consult to Gastroenterology; Future; Expected date: 04/11/2025  -     sucralfate (CARAFATE) 1 gram tablet; Take 1 tablet (1 g total) by mouth 4 (four) times daily. for 10 days  Dispense: 40 tablet; Refill: 0      2. Hemorrhoids, unspecified hemorrhoid type        -     hydrocortisone (ANUSOL-HC) 25 mg suppository; Place 1 suppository (25 mg total) rectally 2 (two) times daily. for 10 days  Dispense: 20 suppository; Refill: 0    Hydrate well  Rest  Consider gen surgery for hemorrhoids  Report to ER immediately if symptoms worsen or persist           No follow-ups on file.         [1]   Social History  Socioeconomic History    Marital status:     Number of children: 3   Tobacco Use    Smoking status: Never    Smokeless tobacco: Never   Substance and Sexual Activity    Alcohol use: No    Drug use: No   Social History Narrative    ** Merged History Encounter **          Social Drivers of Health     Financial Resource Strain: Low Risk  (3/18/2025)    Overall Financial Resource Strain (CARDIA)     Difficulty of Paying Living Expenses: Not hard at all   Food Insecurity: No Food Insecurity (3/18/2025)    Hunger Vital Sign     Worried About Running Out of Food in the Last Year: Never true     Ran Out of Food in the Last Year: Never true   Transportation Needs: No Transportation Needs (3/18/2025)    PRAPARE - Transportation     Lack of Transportation (Medical): No     Lack of Transportation (Non-Medical): No   Physical Activity: Insufficiently Active (3/18/2025)    Exercise Vital Sign      Days of Exercise per Week: 4 days     Minutes of Exercise per Session: 10 min   Stress: No Stress Concern Present (3/18/2025)    [employer] - Occupational Stress Questionnaire     Feeling of Stress : Not at all   Housing Stability: Low Risk  (3/18/2025)    Housing Stability Vital Sign     Unable to Pay for Housing in the Last Year: No     Homeless in the Last Year: No

## 2025-04-07 ENCOUNTER — RESULTS FOLLOW-UP (OUTPATIENT)
Dept: FAMILY MEDICINE | Facility: CLINIC | Age: 75
End: 2025-04-07

## 2025-04-07 LAB
H. PYLORI SURFACE ANTIGEN, INTERPRETATION (OHS): NEGATIVE
HELICOBACTER PYLORI SURFACE ANTIGEN (OHS): 0.08

## 2025-04-08 ENCOUNTER — TELEPHONE (OUTPATIENT)
Dept: FAMILY MEDICINE | Facility: CLINIC | Age: 75
End: 2025-04-08
Payer: MEDICARE

## 2025-04-08 NOTE — TELEPHONE ENCOUNTER
----- Message from Yadira sent at 4/8/2025  2:29 PM CDT -----  Contact: pt  Type:  Patient Returning CallWho Called: ptWho Left Message for Patient: Ashlyn the patient know what this is regarding?: resultsWould the patient rather a call back or a response via [facility]? phoneBest Call Back Number: [phone] Information:

## 2025-04-15 DIAGNOSIS — K21.00 GASTROESOPHAGEAL REFLUX DISEASE WITH ESOPHAGITIS WITHOUT HEMORRHAGE: ICD-10-CM

## 2025-04-15 DIAGNOSIS — Z79.899 ENCOUNTER FOR LONG-TERM (CURRENT) USE OF MEDICATIONS: Primary | ICD-10-CM

## 2025-04-16 RX ORDER — MONTELUKAST SODIUM 10 MG/1
10 TABLET ORAL
Qty: 90 TABLET | Refills: 1 | Status: SHIPPED | OUTPATIENT
Start: 2025-04-16

## 2025-04-16 RX ORDER — FAMOTIDINE 20 MG/1
TABLET, FILM COATED ORAL
Qty: 180 TABLET | Refills: 1 | Status: SHIPPED | OUTPATIENT
Start: 2025-04-16

## 2025-04-16 NOTE — TELEPHONE ENCOUNTER
Refill Routing Note   Medication(s) are not appropriate for processing by [facility] for the following reason(s):        Patient not seen by provider within 15 months    [facility] action(s):  Defer               Appointments  past 12m or future 3m with PCP    Date Provider   Last Visit   10/3/2023 Agustín Carter MD   Next Visit   7/18/2025 Agustín Carter MD   ED visits in past 90 days: 0        Note composed:8:06 AM 04/16/2025

## 2025-06-02 ENCOUNTER — OFFICE VISIT (OUTPATIENT)
Dept: FAMILY MEDICINE | Facility: CLINIC | Age: 75
End: 2025-06-02
Payer: MEDICARE

## 2025-06-02 VITALS
HEART RATE: 77 BPM | DIASTOLIC BLOOD PRESSURE: 60 MMHG | HEIGHT: 62 IN | SYSTOLIC BLOOD PRESSURE: 111 MMHG | WEIGHT: 176.13 LBS | TEMPERATURE: 97 F | OXYGEN SATURATION: 97 % | BODY MASS INDEX: 32.41 KG/M2

## 2025-06-02 DIAGNOSIS — I25.10 ATHEROSCLEROSIS OF NATIVE CORONARY ARTERY WITHOUT ANGINA PECTORIS, UNSPECIFIED WHETHER NATIVE OR TRANSPLANTED HEART: ICD-10-CM

## 2025-06-02 DIAGNOSIS — K21.9 GASTROESOPHAGEAL REFLUX DISEASE, UNSPECIFIED WHETHER ESOPHAGITIS PRESENT: ICD-10-CM

## 2025-06-02 DIAGNOSIS — Z00.00 ANNUAL PHYSICAL EXAM: Primary | ICD-10-CM

## 2025-06-02 DIAGNOSIS — M35.3 POLYMYALGIA RHEUMATICA: ICD-10-CM

## 2025-06-02 DIAGNOSIS — Z79.899 ENCOUNTER FOR LONG-TERM (CURRENT) USE OF MEDICATIONS: Chronic | ICD-10-CM

## 2025-06-02 DIAGNOSIS — E78.5 DYSLIPIDEMIA: Chronic | ICD-10-CM

## 2025-06-02 DIAGNOSIS — E03.9 ACQUIRED HYPOTHYROIDISM: Chronic | ICD-10-CM

## 2025-06-02 DIAGNOSIS — Z76.0 MEDICATION REFILL: ICD-10-CM

## 2025-06-02 DIAGNOSIS — M85.80 OSTEOPENIA, UNSPECIFIED LOCATION: ICD-10-CM

## 2025-06-02 PROCEDURE — 99999 PR PBB SHADOW E&M-EST. PATIENT-LVL V: CPT | Mod: PBBFAC,HCNC,, | Performed by: NURSE PRACTITIONER

## 2025-06-02 RX ORDER — BENZONATATE 200 MG/1
200 CAPSULE ORAL 3 TIMES DAILY PRN
Qty: 30 CAPSULE | Refills: 0 | Status: SHIPPED | OUTPATIENT
Start: 2025-06-02 | End: 2025-06-12

## 2025-06-23 ENCOUNTER — OFFICE VISIT (OUTPATIENT)
Dept: GASTROENTEROLOGY | Facility: CLINIC | Age: 75
End: 2025-06-23
Payer: MEDICARE

## 2025-06-23 VITALS — BODY MASS INDEX: 32.41 KG/M2 | WEIGHT: 176.13 LBS | HEIGHT: 62 IN

## 2025-06-23 DIAGNOSIS — Z12.11 SCREENING FOR COLON CANCER: ICD-10-CM

## 2025-06-23 DIAGNOSIS — K59.09 CHRONIC CONSTIPATION: ICD-10-CM

## 2025-06-23 DIAGNOSIS — Z90.49 S/P CHOLECYSTECTOMY: ICD-10-CM

## 2025-06-23 DIAGNOSIS — K21.00 GASTROESOPHAGEAL REFLUX DISEASE WITH ESOPHAGITIS WITHOUT HEMORRHAGE: Primary | ICD-10-CM

## 2025-06-23 PROCEDURE — 1160F RVW MEDS BY RX/DR IN RCRD: CPT | Mod: CPTII,HCNC,S$GLB, | Performed by: NURSE PRACTITIONER

## 2025-06-23 PROCEDURE — 99999 PR PBB SHADOW E&M-EST. PATIENT-LVL IV: CPT | Mod: PBBFAC,HCNC,, | Performed by: NURSE PRACTITIONER

## 2025-06-23 PROCEDURE — 1101F PT FALLS ASSESS-DOCD LE1/YR: CPT | Mod: CPTII,HCNC,S$GLB, | Performed by: NURSE PRACTITIONER

## 2025-06-23 PROCEDURE — 3288F FALL RISK ASSESSMENT DOCD: CPT | Mod: CPTII,HCNC,S$GLB, | Performed by: NURSE PRACTITIONER

## 2025-06-23 PROCEDURE — 1126F AMNT PAIN NOTED NONE PRSNT: CPT | Mod: CPTII,HCNC,S$GLB, | Performed by: NURSE PRACTITIONER

## 2025-06-23 PROCEDURE — 99214 OFFICE O/P EST MOD 30 MIN: CPT | Mod: HCNC,S$GLB,, | Performed by: NURSE PRACTITIONER

## 2025-06-23 PROCEDURE — 1159F MED LIST DOCD IN RCRD: CPT | Mod: CPTII,HCNC,S$GLB, | Performed by: NURSE PRACTITIONER

## 2025-06-23 RX ORDER — ESOMEPRAZOLE MAGNESIUM 40 MG/1
40 CAPSULE, DELAYED RELEASE ORAL 2 TIMES DAILY
Qty: 180 CAPSULE | Refills: 3 | Status: SHIPPED | OUTPATIENT
Start: 2025-06-23 | End: 2026-06-23

## 2025-06-23 NOTE — PROGRESS NOTES
Subjective:       Patient ID: Flores Adamson is a 74 y.o. female, Body mass index is 32.22 kg/m².    Chief Complaint: Gastroesophageal Reflux      Patient is new to me. Established patient of Dr. Alvarado.  Referred by Dr. Tello for GERD.    Gastroesophageal Reflux  She complains of heartburn, nausea and water brash. She reports no abdominal pain, no belching, no chest pain, no choking, no coughing, no dysphagia, no early satiety, no globus sensation, no hoarse voice, no sore throat, no stridor, no tooth decay or no wheezing. This is a chronic problem. The current episode started more than 1 year ago. The problem occurs frequently. The problem has been gradually worsening (since cholecystectomy in 2021). The heartburn is located in the substernum. The heartburn is of severe intensity. The heartburn wakes her from sleep. The heartburn limits her activity. The heartburn changes with position. The symptoms are aggravated by lying down. Pertinent negatives include no anemia, melena or weight loss. Risk factors include obesity. She has tried a histamine-2 antagonist, a PPI and head elevation (Currently: Protonix 40 mg BID and Pepcid 20 mg BID) for the symptoms. The treatment provided no relief. Past procedures include an abdominal ultrasound and an EGD.     Review of Systems   Constitutional:  Negative for appetite change, fever, unexpected weight change and weight loss.   HENT:  Negative for hoarse voice, sore throat and trouble swallowing.    Respiratory:  Negative for cough, choking, shortness of breath and wheezing.    Cardiovascular:  Negative for chest pain.   Gastrointestinal:  Positive for constipation (chronic problem), heartburn, nausea and vomiting. Negative for abdominal distention, abdominal pain, anal bleeding, blood in stool, diarrhea, dysphagia, melena and rectal pain.   Genitourinary:  Negative for difficulty urinating and dysuria.   Musculoskeletal:  Negative for gait problem.   Skin:   Negative for rash.   Neurological:  Negative for speech difficulty.   Psychiatric/Behavioral:  Negative for confusion.        Past Medical History:   Diagnosis Date    Acquired hypothyroidism 10/6/2021    Anemia 9/18/2023    Angina pectoris     Anticoagulant long-term use     CAD (coronary artery disease)     with stents x 3    GERD (gastroesophageal reflux disease)       Past Surgical History:   Procedure Laterality Date    2006 2 stents      2009 1 stent      CHOLECYSTECTOMY  2021    Dr. Roach    ESOPHAGOGASTRODUODENOSCOPY N/A 11/28/2018    Procedure: EGD (ESOPHAGOGASTRODUODENOSCOPY);  Surgeon: Marvin Alvarado MD;  Location: [facility];  Service: Endoscopy;  Laterality: N/A;    WRIST FRACTURE SURGERY  03/2018      Family History   Problem Relation Name Age of Onset    Heart disease Father        Wt Readings from Last 10 Encounters:   06/23/25 79.9 kg (176 lb 2.4 oz)   06/02/25 79.9 kg (176 lb 1.6 oz)   04/04/25 80.4 kg (177 lb 4.8 oz)   03/18/25 79.4 kg (175 lb)   12/03/24 79.4 kg (175 lb)   09/23/24 81.7 kg (180 lb 1.6 oz)   02/29/24 81.6 kg (180 lb)   11/08/23 82.1 kg (181 lb)   10/18/23 82.3 kg (181 lb 7 oz)   10/03/23 85.3 kg (188 lb)     Lab Results   Component Value Date    WBC 7.27 11/29/2024    HGB 12.7 11/29/2024    HCT 39.4 11/29/2024    MCV 98 11/29/2024     11/29/2024     CMP  Sodium   Date Value Ref Range Status   11/29/2024 143 136 - 145 mmol/L Final     Potassium   Date Value Ref Range Status   11/29/2024 4.6 3.5 - 5.1 mmol/L Final     Chloride   Date Value Ref Range Status   11/29/2024 108 95 - 110 mmol/L Final     CO2   Date Value Ref Range Status   11/29/2024 25 23 - 29 mmol/L Final     Glucose   Date Value Ref Range Status   11/29/2024 98 70 - 110 mg/dL Final     BUN   Date Value Ref Range Status   11/29/2024 12 8 - 23 mg/dL Final     Creatinine   Date Value Ref Range Status   11/29/2024 0.8 0.5 - 1.4 mg/dL Final     Calcium   Date Value Ref Range Status   11/29/2024 9.7 8.7 - 10.5  mg/dL Final     Total Protein   Date Value Ref Range Status   11/29/2024 7.7 6.0 - 8.4 g/dL Final     Albumin   Date Value Ref Range Status   11/29/2024 3.9 3.5 - 5.2 g/dL Final     Total Bilirubin   Date Value Ref Range Status   11/29/2024 0.5 0.1 - 1.0 mg/dL Final     Comment:     For infants and newborns, interpretation of results should be based  on gestational age, weight and in agreement with clinical  observations.    Premature Infant recommended reference ranges:  Up to 24 hours.............<8.0 mg/dL  Up to 48 hours............<12.0 mg/dL  3-5 days..................<15.0 mg/dL  6-29 days.................<15.0 mg/dL       Alkaline Phosphatase   Date Value Ref Range Status   11/29/2024 107 40 - 150 U/L Final     AST   Date Value Ref Range Status   11/29/2024 24 10 - 40 U/L Final     ALT   Date Value Ref Range Status   11/29/2024 20 10 - 44 U/L Final     Anion Gap   Date Value Ref Range Status   11/29/2024 10 8 - 16 mmol/L Final     eGFR if    Date Value Ref Range Status   07/22/2022 >60.0 >60 mL/min/1.73 m^2 Final     eGFR if non    Date Value Ref Range Status   07/22/2022 >60.0 >60 mL/min/1.73 m^2 Final     Comment:     Calculation used to obtain the estimated glomerular filtration  rate (eGFR) is the CKD-EPI equation.                 Reviewed prior medical records including radiology report of US of abdomen 8/13/21 & endoscopy history (see surgical history).     Objective:      Physical Exam  Constitutional:       General: She is not in acute distress.     Appearance: She is well-developed.   HENT:      Head: Normocephalic.      Right Ear: Hearing normal.      Left Ear: Hearing normal.      Nose: Nose normal.      Mouth/Throat:      Mouth: No oral lesions.      Pharynx: Uvula midline.   Eyes:      General: Lids are normal.      Conjunctiva/sclera: Conjunctivae normal.      Pupils: Pupils are equal, round, and reactive to light.   Neck:      Trachea: Trachea normal.    Cardiovascular:      Rate and Rhythm: Normal rate and regular rhythm.      Heart sounds: Normal heart sounds. No murmur heard.  Pulmonary:      Effort: Pulmonary effort is normal. No respiratory distress.      Breath sounds: Normal breath sounds. No stridor. No wheezing.   Abdominal:      General: Bowel sounds are normal. There is no distension.      Palpations: Abdomen is soft. There is no mass.      Tenderness: There is generalized abdominal tenderness (mild). There is no guarding or rebound.   Musculoskeletal:         General: Normal range of motion.      Cervical back: Normal range of motion.   Skin:     General: Skin is warm and dry.      Findings: No rash.      Comments: Non jaundiced   Neurological:      Mental Status: She is alert and oriented to person, place, and time.   Psychiatric:         Speech: Speech normal.         Behavior: Behavior normal. Behavior is cooperative.           Assessment:       1. Gastroesophageal reflux disease with esophagitis without hemorrhage    2. Chronic constipation    3. Screening for colon cancer    4. S/P cholecystectomy           Plan:   All diagnoses and orders for this visit:    Gastroesophageal reflux disease with esophagitis without hemorrhage  - Schedule EGD   - Start: esomeprazole (NEXIUM) 40 MG capsule; Take 1 capsule (40 mg total) by mouth 2 (two) times daily.  Dispense: 180 capsule; Refill: 3  - Discontinue Protonix  - Continue Pepcid 20 mg BID  - Take PPI in the morning 30 minutes before breakfast and dinner  - Recommend to avoid large meals, avoid eating within 3 hours of bedtime, elevate head of bed if nocturnal symptoms are present, smoking cessation (if current smoker), & weight loss (if overweight).   - Recommend minimize/avoid high-fat foods, chocolate, caffeine, citrus, alcohol, & tomato products.  - Advised to avoid/limit use of NSAID's, since they can cause GI upset, bleeding, and/or ulcers. If needed, take with food.      Chronic constipation   -  Recommend daily exercise as tolerated, adequate water intake, and high fiber diet.   - Recommend OTC fiber supplement (Benefiber)  - Recommend OTC stool softener (Colace)  - Recommend OTC MiraLax once daily (17g PO) as directed     Screening for colon cancer   - Schedule Colonoscopy     S/P cholecystectomy    If no improvement in symptoms or symptoms worsen, call/follow-up at clinic or go to ER

## 2025-07-09 ENCOUNTER — PATIENT MESSAGE (OUTPATIENT)
Dept: FAMILY MEDICINE | Facility: CLINIC | Age: 75
End: 2025-07-09
Payer: MEDICARE

## 2025-07-09 ENCOUNTER — TELEPHONE (OUTPATIENT)
Dept: FAMILY MEDICINE | Facility: CLINIC | Age: 75
End: 2025-07-09
Payer: MEDICARE

## 2025-07-09 NOTE — TELEPHONE ENCOUNTER
Copied from CRM #[id number]. Topic: General Inquiry - Return Call  >> Jul 9, 2025  2:52 PM Martha wrote:  .Type:  Patient Returning Call    Who Called:patient  Who Left Message for Patient:Madonna Jones LPN  Does the patient know what this is regarding?:  Would the patient rather a call back or a response via [facility]? Call   Best Call Back Number:.[phone]    Additional Information:

## 2025-07-10 ENCOUNTER — LAB VISIT (OUTPATIENT)
Dept: LAB | Facility: HOSPITAL | Age: 75
End: 2025-07-10
Attending: FAMILY MEDICINE
Payer: MEDICARE

## 2025-07-10 DIAGNOSIS — Z13.6 ENCOUNTER FOR LIPID SCREENING FOR CARDIOVASCULAR DISEASE: ICD-10-CM

## 2025-07-10 DIAGNOSIS — Z00.01 ENCOUNTER FOR GENERAL ADULT MEDICAL EXAMINATION WITH ABNORMAL FINDINGS: ICD-10-CM

## 2025-07-10 DIAGNOSIS — Z79.899 ENCOUNTER FOR LONG-TERM (CURRENT) USE OF MEDICATIONS: ICD-10-CM

## 2025-07-10 DIAGNOSIS — Z13.220 ENCOUNTER FOR LIPID SCREENING FOR CARDIOVASCULAR DISEASE: ICD-10-CM

## 2025-07-10 LAB
ABSOLUTE EOSINOPHIL (OHS): 0.24 K/UL
ABSOLUTE MONOCYTE (OHS): 0.61 K/UL (ref 0.3–1)
ABSOLUTE NEUTROPHIL COUNT (OHS): 4.05 K/UL (ref 1.8–7.7)
ALBUMIN SERPL BCP-MCNC: 3.7 G/DL (ref 3.5–5.2)
ALP SERPL-CCNC: 111 UNIT/L (ref 40–150)
ALT SERPL W/O P-5'-P-CCNC: 20 UNIT/L (ref 10–44)
ANION GAP (OHS): 10 MMOL/L (ref 8–16)
AST SERPL-CCNC: 25 UNIT/L (ref 11–45)
BASOPHILS # BLD AUTO: 0.04 K/UL
BASOPHILS NFR BLD AUTO: 0.6 %
BILIRUB SERPL-MCNC: 0.4 MG/DL (ref 0.1–1)
BUN SERPL-MCNC: 15 MG/DL (ref 8–23)
CALCIUM SERPL-MCNC: 9.3 MG/DL (ref 8.7–10.5)
CHLORIDE SERPL-SCNC: 110 MMOL/L (ref 95–110)
CHOLEST SERPL-MCNC: 133 MG/DL (ref 120–199)
CHOLEST/HDLC SERPL: 2.7 {RATIO} (ref 2–5)
CO2 SERPL-SCNC: 22 MMOL/L (ref 23–29)
CREAT SERPL-MCNC: 0.8 MG/DL (ref 0.5–1.4)
EAG (OHS): 111 MG/DL (ref 68–131)
ERYTHROCYTE [DISTWIDTH] IN BLOOD BY AUTOMATED COUNT: 12.2 % (ref 11.5–14.5)
GFR SERPLBLD CREATININE-BSD FMLA CKD-EPI: >60 ML/MIN/1.73/M2
GLUCOSE SERPL-MCNC: 101 MG/DL (ref 70–110)
HBA1C MFR BLD: 5.5 % (ref 4–5.6)
HCT VFR BLD AUTO: 37.7 % (ref 37–48.5)
HDLC SERPL-MCNC: 50 MG/DL (ref 40–75)
HDLC SERPL: 37.6 % (ref 20–50)
HGB BLD-MCNC: 12.6 GM/DL (ref 12–16)
IMM GRANULOCYTES # BLD AUTO: 0.01 K/UL (ref 0–0.04)
IMM GRANULOCYTES NFR BLD AUTO: 0.1 % (ref 0–0.5)
LDLC SERPL CALC-MCNC: 63.6 MG/DL (ref 63–159)
LYMPHOCYTES # BLD AUTO: 2.07 K/UL (ref 1–4.8)
MCH RBC QN AUTO: 31.5 PG (ref 27–31)
MCHC RBC AUTO-ENTMCNC: 33.4 G/DL (ref 32–36)
MCV RBC AUTO: 94 FL (ref 82–98)
NONHDLC SERPL-MCNC: 83 MG/DL
NUCLEATED RBC (/100WBC) (OHS): 0 /100 WBC
PLATELET # BLD AUTO: 155 K/UL (ref 150–450)
PMV BLD AUTO: 13 FL (ref 9.2–12.9)
POTASSIUM SERPL-SCNC: 4.2 MMOL/L (ref 3.5–5.1)
PROT SERPL-MCNC: 7.3 GM/DL (ref 6–8.4)
RBC # BLD AUTO: 4 M/UL (ref 4–5.4)
RELATIVE EOSINOPHIL (OHS): 3.4 %
RELATIVE LYMPHOCYTE (OHS): 29.5 % (ref 18–48)
RELATIVE MONOCYTE (OHS): 8.7 % (ref 4–15)
RELATIVE NEUTROPHIL (OHS): 57.7 % (ref 38–73)
SODIUM SERPL-SCNC: 142 MMOL/L (ref 136–145)
T4 FREE SERPL-MCNC: 0.79 NG/DL (ref 0.71–1.51)
TRIGL SERPL-MCNC: 97 MG/DL (ref 30–150)
TSH SERPL-ACNC: 4.24 UIU/ML (ref 0.4–4)
WBC # BLD AUTO: 7.02 K/UL (ref 3.9–12.7)

## 2025-07-10 PROCEDURE — 84443 ASSAY THYROID STIM HORMONE: CPT | Mod: HCNC

## 2025-07-10 PROCEDURE — 85025 COMPLETE CBC W/AUTO DIFF WBC: CPT | Mod: HCNC

## 2025-07-10 PROCEDURE — 84439 ASSAY OF FREE THYROXINE: CPT | Mod: HCNC

## 2025-07-10 PROCEDURE — 36415 COLL VENOUS BLD VENIPUNCTURE: CPT | Mod: HCNC,PO

## 2025-07-10 PROCEDURE — 80053 COMPREHEN METABOLIC PANEL: CPT | Mod: HCNC

## 2025-07-10 PROCEDURE — 83036 HEMOGLOBIN GLYCOSYLATED A1C: CPT | Mod: HCNC

## 2025-07-10 PROCEDURE — 80061 LIPID PANEL: CPT | Mod: HCNC

## 2025-07-18 ENCOUNTER — OFFICE VISIT (OUTPATIENT)
Dept: FAMILY MEDICINE | Facility: CLINIC | Age: 75
End: 2025-07-18
Payer: MEDICARE

## 2025-07-18 VITALS
SYSTOLIC BLOOD PRESSURE: 130 MMHG | WEIGHT: 177.13 LBS | OXYGEN SATURATION: 99 % | BODY MASS INDEX: 32.59 KG/M2 | HEIGHT: 62 IN | DIASTOLIC BLOOD PRESSURE: 70 MMHG | HEART RATE: 73 BPM

## 2025-07-18 DIAGNOSIS — M54.2 NECK PAIN: Primary | ICD-10-CM

## 2025-07-18 DIAGNOSIS — Z79.899 ENCOUNTER FOR LONG-TERM (CURRENT) USE OF MEDICATIONS: ICD-10-CM

## 2025-07-18 DIAGNOSIS — E78.5 DYSLIPIDEMIA: ICD-10-CM

## 2025-07-18 DIAGNOSIS — E03.9 ACQUIRED HYPOTHYROIDISM: ICD-10-CM

## 2025-07-18 PROCEDURE — 99999 PR PBB SHADOW E&M-EST. PATIENT-LVL IV: CPT | Mod: PBBFAC,HCNC,, | Performed by: FAMILY MEDICINE

## 2025-07-18 RX ORDER — LEVOTHYROXINE SODIUM 50 UG/1
50 TABLET ORAL
Qty: 90 TABLET | Refills: 1 | Status: SHIPPED | OUTPATIENT
Start: 2025-07-18

## 2025-07-18 NOTE — PATIENT INSTRUCTIONS
Chris Tanner,     If you are due for any health screening(s) below please notify me so we can arrange them to be ordered and scheduled. Most healthy patients at your age complete them, but you are free to accept or refuse.     If you can't do it, I'll definitely understand. If you can, I'd certainly appreciate it!    All of your core healthy metrics are met.

## 2025-07-18 NOTE — ASSESSMENT & PLAN NOTE
She is taking the medication correctly.  Increase levothyroxine to 50 micrograms daily.    Please be advised of hypothyroidism disease course.  We will plan to monitor thyroid labs at routine intervals.  Please be advised of risks and benefits of medication use, see medication insert for complete details.  ER precautions.    Common complaints from hypothyroidism include weight gain, fatigue, cold intolerance, constipation, dry and flaky shin, coarse or loss of hair, and trouble with memory.     Complaints with hyperthyroidism are weight loss or decrease in appetite, weakness and fatigue, visual changes, fast heart rate, decreased heat tolerance, thinning scalp hair, change in bowel habits, and palpations.    Recheck thyroid labs in three months.

## 2025-07-18 NOTE — PROGRESS NOTES
PLAN:    Assessment & Plan    M54.2 Neck pain  E03.9 Acquired hypothyroidism  Z79.899 Encounter for long-term (current) use of medications  E78.5 Dyslipidemia    IMPRESSION:   Assessed thyroid function based on lab results and reported fatigue; determined current levothyroxine dose insufficient.   Evaluated use of Montelukast; decided to discontinue due to unclear indication and concerns about side effects.   Reviewed reflux symptoms; noted ongoing management with Nexium and pending GI scope.   Considered sleep patterns and fatigue; attributed to potential thyroid dysfunction and poor sleep hygiene.    PLAN SUMMARY:   Ordered thyroid function test in 3 months   Increased levothyroxine to 50 mcg daily, to be taken on empty stomach   Discontinued Montelukast (Singulair)   Continue Nexium for reflux symptoms   Continue atorvastatin 40 mg for dyslipidemia   Use Flonase nasal spray as needed for sinus symptoms   Limit sleep to 8-9 hours per night and avoid late bedtimes   Follow up in 3 months for lab check    ACQUIRED HYPOTHYROIDISM:   Explained importance of proper thyroid hormone levels for energy and overall well-being.   Increased levothyroxine from 25 mcg to 50 mcg daily, to be taken first thing in the morning on an empty stomach.   Wait 30 minutes after taking levothyroxine before consuming other medications or food.   Ordered thyroid function test in 3 months.    ENCOUNTER FOR LONG-TERM (CURRENT) USE OF MEDICATIONS:   Patient can use Flonase nasal spray as needed for sinus symptoms, up to twice daily during flare-ups.   Discontinued Montelukast (Singulair).   Continued Nexium for reflux symptoms and atorvastatin 40 mg for dyslipidemia.   Follow up in 3 months for lab check.   Contact the office if not feeling better after medication adjustments.    DYSLIPIDEMIA:   Continued atorvastatin 40 mg as noted above.    LIFESTYLE CHANGES:   Discussed relationship between excessive sleep (>8-9 hours) and potential negative  health impacts.   Emphasized importance of maintaining regular sleep schedule for circadian rhythm regulation.   Patient advised to limit sleep to 8-9 hours per night and avoid late bedtimes (2:00-3:00 AM).         Problem List Items Addressed This Visit       Dyslipidemia (Chronic)    Update labs. Counseled on hyperlipidemia disease course, healthy diet and increased need for exercise. Please be advised of the risk of cardiovascular disease, increase stroke and heart attack risk with uncontrolled/untreated hyperlipidemia. Patient voiced understanding and understood the treatment plan. All questions were answered.          Encounter for long-term (current) use of medications (Chronic)    Complete history and physical was completed today.  Complete and thorough medication reconciliation was performed.  Discussed risks and benefits of medications.  Advised patient on orders and health maintenance.  We discussed old records and old labs if available.  Will request any records not available through epic.  Continue current medications listed on your summary sheet.           Acquired hypothyroidism (Chronic)    She is taking the medication correctly.  Increase levothyroxine to 50 micrograms daily.    Please be advised of hypothyroidism disease course.  We will plan to monitor thyroid labs at routine intervals.  Please be advised of risks and benefits of medication use, see medication insert for complete details.  ER precautions.    Common complaints from hypothyroidism include weight gain, fatigue, cold intolerance, constipation, dry and flaky shin, coarse or loss of hair, and trouble with memory.     Complaints with hyperthyroidism are weight loss or decrease in appetite, weakness and fatigue, visual changes, fast heart rate, decreased heat tolerance, thinning scalp hair, change in bowel habits, and palpations.    Recheck thyroid labs in three months.         Relevant Medications    levothyroxine (SYNTHROID) 50 MCG  tablet    Other Relevant Orders    TSH    T4, Free    T3, Free    Neck pain - Primary     Future Appointments       Date Specialty Appt Notes    10/17/2025 Lab            Medication Management for assessment above:   Medication List with Changes/Refills   Current Medications    ACETAMINOPHEN (TYLENOL) 650 MG TBSR    Take 650 mg by mouth every 8 (eight) hours.    ASCORBIC ACID, VITAMIN C, (VITAMIN C) 500 MG TABLET    Take 500 mg by mouth once daily.    ASPIRIN (ECOTRIN) 81 MG EC TABLET    Take 81 mg by mouth once daily.    ATORVASTATIN (LIPITOR) 40 MG TABLET    TAKE 1 TABLET EVERY DAY    CALCIUM-VITAMIN D3 (OS-SHEILA 500 + D3) 500 MG(1,250MG) -200 UNIT PER TABLET    Take 1 tablet by mouth 2 (two) times daily with meals.    CARBAMIDE PEROXIDE (DEBROX) 6.5 % OTIC SOLUTION    Place 5 drops into both ears 2 (two) times daily.    ESOMEPRAZOLE (NEXIUM) 40 MG CAPSULE    Take 1 capsule (40 mg total) by mouth 2 (two) times daily.    FLUTICASONE PROPIONATE (FLONASE) 50 MCG/ACTUATION NASAL SPRAY    1 spray (50 mcg total) by Each Nostril route once daily.    GABAPENTIN (NEURONTIN) 300 MG CAPSULE    TAKE 3 CAPSULES EVERY EVENING    MULTIVITAMIN (THERAGRAN) PER TABLET    Take 1 tablet by mouth once daily.    NITROGLYCERIN (NITROSTAT) 0.4 MG SL TABLET    Place 0.4 mg under the tongue.    ONDANSETRON (ZOFRAN) 8 MG TABLET    Take 1 tablet (8 mg total) by mouth every 8 (eight) hours as needed for Nausea.   Changed and/or Refilled Medications    Modified Medication Previous Medication    LEVOTHYROXINE (SYNTHROID) 50 MCG TABLET levothyroxine (SYNTHROID) 25 MCG tablet       Take 1 tablet (50 mcg total) by mouth before breakfast.    TAKE 1 TABLET EVERY DAY (REPEAT TSH IN 2 MONTHS)   Discontinued Medications    FAMOTIDINE (PEPCID) 20 MG TABLET    TAKE 1 TABLET TWICE DAILY AS NEEDED FOR HEARTBURN    MONTELUKAST (SINGULAIR) 10 MG TABLET    TAKE 1 TABLET EVERY DAY       Agustín Carter,  M.D.  ==========================================================================  Subjective:   Patient ID: Flores Adamson is a 74 y.o. female.  has a past medical history of Acquired hypothyroidism (10/6/2021), Anemia (9/18/2023), Angina pectoris, Anticoagulant long-term use, CAD (coronary artery disease), and GERD (gastroesophageal reflux disease).   Chief Complaint: Follow-up      History of Present Illness    CHIEF COMPLAINT:  Patient presents for a follow-up visit to discuss acid reflux, medication side effects, and recent lab results.    HPI:  Patient, a 74-year-old female, reports acid reflux. She has been prescribed Nexium, which has significantly improved her reflux symptoms. She is scheduled for a gastroenterology scope to further evaluate her condition.    She mentions feeling tired all day, which she attributes to her thyroid medication. She reports needing at least 11 hours of sleep and having difficulty getting up in the morning. She feels exhausted the day after engaging in prolonged activities outside the home and has difficulty with daily activities, stating she feels fatigue after performing simple household tasks.    She reports neck pain yesterday, which she believes was due to sleeping in an awkward position. She describes the pain as severe but does not provide a numerical pain scale. She applied a roll-on lidocaine product, which helped alleviate the pain.    She mentions occasionally hearing a whizzing sound at night, which she manages by using one spray of Flonase in each nostril. She reports always having mild sinus problems, but states it has improved over time.    She expresses concern about potential side effects from Montelukast (Singulair). She reports reading about potential side effects online, including muscle pain and other symptoms she currently has. She is unsure why she was prescribed this medication but recalls it was started when she was hospitalized for breathing  difficulties.    She denies any other significant symptoms.    MEDICATIONS:  Patient is on Atorvastatin 40 mg daily for cholesterol management and Levothyroxine 50 mcg daily for thyroid management, taken first thing in the morning on an empty stomach. She is also on Nexium daily for acid reflux, which replaced Pepcid for better management. Patient uses Flonase nasal spray as needed for sinus problems and Lidocaine roll-on as needed for pain relief. Levothyroxine was increased from 25 mcg to 50 mcg daily. Montelukast (Singulair) was discontinued due to concerns about side effects.    MEDICAL HISTORY:  Patient has a history of arthritis, acid reflux, sinus problems, and thyroid issues. Patient has received the pneumonia vaccine.    FAMILY HISTORY:  Family history is significant for the patient's daughter who had surgery for a cyst on her neck.      ROS:  ROS as indicated in HPI.         Problem List Items Addressed This Visit       Dyslipidemia (Chronic)    Overview   July 2025:   Hyperlipidemia Medications              atorvastatin (LIPITOR) 40 MG tablet TAKE 1 TABLET EVERY DAY          CHRONIC. STABLE. Lab analysis reviewed.   (-) CP, SOB, abdominal pain, N/V/D, constipation, jaundice, skin changes.  (-) Myalgias  Lab Results   Component Value Date    CHOL 133 07/10/2025    CHOL 142 11/29/2024    CHOL 136 09/18/2023     Lab Results   Component Value Date    HDL 50 07/10/2025    HDL 58 11/29/2024    HDL 63 09/18/2023     Lab Results   Component Value Date    LDLCALC 63.6 07/10/2025    LDLCALC 69.6 11/29/2024    LDLCALC 60.6 (L) 09/18/2023     Lab Results   Component Value Date    TRIG 97 07/10/2025    TRIG 72 11/29/2024    TRIG 62 09/18/2023     Lab Results   Component Value Date    CHOLHDL 37.6 07/10/2025    CHOLHDL 40.8 11/29/2024    CHOLHDL 46.3 09/18/2023     Lab Results   Component Value Date    TOTALCHOLEST 2.7 07/10/2025    TOTALCHOLEST 2.4 11/29/2024    TOTALCHOLEST 2.2 09/18/2023     Lab Results   Component  Value Date    ALT 20 07/10/2025    AST 25 07/10/2025    ALKPHOS 111 07/10/2025    BILITOT 0.4 07/10/2025     ======================================================  The 10-year ASCVD risk score (Celina ANDINO, et al., 2019) is: 17.8%    Values used to calculate the score:      Age: 74 years      Sex: Female      Is Non- : No      Diabetic: No      Tobacco smoker: No      Systolic Blood Pressure: 130 mmHg      Is BP treated: No      HDL Cholesterol: 50 mg/dL      Total Cholesterol: 133 mg/dL           Current Assessment & Plan   Update labs. Counseled on hyperlipidemia disease course, healthy diet and increased need for exercise. Please be advised of the risk of cardiovascular disease, increase stroke and heart attack risk with uncontrolled/untreated hyperlipidemia. Patient voiced understanding and understood the treatment plan. All questions were answered.          Encounter for long-term (current) use of medications (Chronic)    Overview   CHRONIC. Stable. Compliant with medications for managed conditions. See medication list. No SE reported.   Routine lab analysis is being monitored. Refills were addressed.  Lab Results   Component Value Date    WBC 7.02 07/10/2025    HGB 12.6 07/10/2025    HCT 37.7 07/10/2025    MCV 94 07/10/2025     07/10/2025         Chemistry        Component Value Date/Time     07/10/2025 1008     11/29/2024 1223    K 4.2 07/10/2025 1008    K 4.6 11/29/2024 1223     07/10/2025 1008     11/29/2024 1223    CO2 22 (L) 07/10/2025 1008    CO2 25 11/29/2024 1223    BUN 15 07/10/2025 1008    CREATININE 0.8 07/10/2025 1008     07/10/2025 1008    GLU 98 11/29/2024 1223        Component Value Date/Time    CALCIUM 9.3 07/10/2025 1008    CALCIUM 9.7 11/29/2024 1223    ALKPHOS 111 07/10/2025 1008    ALKPHOS 107 11/29/2024 1223    AST 25 07/10/2025 1008    AST 24 11/29/2024 1223    ALT 20 07/10/2025 1008    ALT 20 11/29/2024 1223    BILITOT 0.4  07/10/2025 1008    BILITOT 0.5 11/29/2024 1223    ESTGFRAFRICA >60.0 07/22/2022 1422    EGFRNONAA >60.0 07/22/2022 1422          Lab Results   Component Value Date    TSH 4.241 (H) 07/10/2025    FREET4 0.79 07/10/2025              Current Assessment & Plan   Complete history and physical was completed today.  Complete and thorough medication reconciliation was performed.  Discussed risks and benefits of medications.  Advised patient on orders and health maintenance.  We discussed old records and old labs if available.  Will request any records not available through epic.  Continue current medications listed on your summary sheet.           Acquired hypothyroidism (Chronic)    Overview   July 2025:  Having fatigue    Previous history Chronic. Stable. Taking Levothyroxine 25 mcg daily.    Lab Results   Component Value Date    TSH 4.241 (H) 07/10/2025            Current Assessment & Plan   She is taking the medication correctly.  Increase levothyroxine to 50 micrograms daily.    Please be advised of hypothyroidism disease course.  We will plan to monitor thyroid labs at routine intervals.  Please be advised of risks and benefits of medication use, see medication insert for complete details.  ER precautions.    Common complaints from hypothyroidism include weight gain, fatigue, cold intolerance, constipation, dry and flaky shin, coarse or loss of hair, and trouble with memory.     Complaints with hyperthyroidism are weight loss or decrease in appetite, weakness and fatigue, visual changes, fast heart rate, decreased heat tolerance, thinning scalp hair, change in bowel habits, and palpations.    Recheck thyroid labs in three months.         Neck pain - Primary        Review of patient's allergies indicates:   Allergen Reactions    Sulfa (sulfonamide antibiotics) Shortness Of Breath     Current Outpatient Medications   Medication Instructions    acetaminophen (TYLENOL) 650 mg, Every 8 hours    ascorbic acid (vitamin C)  "(VITAMIN C) 500 mg, Daily    aspirin (ECOTRIN) 81 mg, Daily    atorvastatin (LIPITOR) 40 MG tablet TAKE 1 TABLET EVERY DAY    calcium-vitamin D3 (OS-SHEILA 500 + D3) 500 mg(1,250mg) -200 unit per tablet 1 tablet, 2 times daily with meals    carbamide peroxide (DEBROX) 6.5 % otic solution 5 drops, Both Ears, 2 times daily    esomeprazole (NEXIUM) 40 mg, Oral, 2 times daily    fluticasone propionate (FLONASE) 50 mcg, Each Nostril, Daily    gabapentin (NEURONTIN) 900 mg, Oral, Nightly    levothyroxine (SYNTHROID) 50 mcg, Oral, Before breakfast    multivitamin (THERAGRAN) per tablet 1 tablet, Daily    nitroGLYCERIN (NITROSTAT) 0.4 mg    ondansetron (ZOFRAN) 8 mg, Oral, Every 8 hours PRN      I have reviewed the PMH, social history, FamilyHx, surgical history, allergies and medications documented / confirmed by the patient at the time of this visit.  Review of Systems   Constitutional:  Negative for chills, fatigue, fever and unexpected weight change.   HENT:  Negative for ear pain and sore throat.    Eyes:  Negative for redness and visual disturbance.   Respiratory:  Negative for cough and shortness of breath.    Cardiovascular:  Negative for chest pain and palpitations.   Gastrointestinal:  Negative for nausea and vomiting.   Genitourinary:  Negative for difficulty urinating and hematuria.   Musculoskeletal:  Positive for myalgias and neck pain. Negative for arthralgias.   Skin:  Negative for rash and wound.   Neurological:  Negative for dizziness, weakness and headaches.   Psychiatric/Behavioral:  Negative for sleep disturbance. The patient is not nervous/anxious.      Objective:   /70   Pulse 73   Ht 5' 2" (1.575 m)   Wt 80.3 kg (177 lb 1.6 oz)   SpO2 99%   BMI 32.39 kg/m²   Physical Exam  Vitals and nursing note reviewed.   Constitutional:       General: She is not in acute distress.     Appearance: She is well-developed. She is not ill-appearing, toxic-appearing or diaphoretic.   HENT:      Head: " Normocephalic and atraumatic.      Right Ear: Hearing and external ear normal.      Left Ear: Hearing and external ear normal.      Nose: Nose normal. No rhinorrhea.   Eyes:      General: Lids are normal.      Extraocular Movements: Extraocular movements intact.      Conjunctiva/sclera: Conjunctivae normal.      Pupils: Pupils are equal, round, and reactive to light.   Neck:      Vascular: No carotid bruit.     Cardiovascular:      Rate and Rhythm: Normal rate.      Pulses: Normal pulses.   Pulmonary:      Effort: Pulmonary effort is normal. No respiratory distress.      Breath sounds: Normal breath sounds.   Abdominal:      General: Bowel sounds are normal.      Palpations: Abdomen is soft.   Musculoskeletal:         General: Tenderness present. No deformity. Normal range of motion.      Cervical back: Normal range of motion and neck supple. Spasms present. Muscular tenderness present. No pain with movement or spinous process tenderness.      Right lower leg: No edema.      Left lower leg: No edema.   Lymphadenopathy:      Cervical: No cervical adenopathy.   Skin:     General: Skin is warm and dry.      Capillary Refill: Capillary refill takes less than 2 seconds.      Coloration: Skin is not pale.   Neurological:      General: No focal deficit present.      Mental Status: She is alert and oriented to person, place, and time. She is not disoriented.      Cranial Nerves: No cranial nerve deficit.      Motor: No weakness.      Gait: Gait normal.   Psychiatric:         Attention and Perception: She is attentive.         Mood and Affect: Mood normal. Mood is not anxious or depressed.         Speech: Speech is not rapid and pressured or slurred.         Behavior: Behavior normal. Behavior is not agitated, aggressive or hyperactive. Behavior is cooperative.         Thought Content: Thought content normal. Thought content is not paranoid or delusional. Thought content does not include homicidal or suicidal ideation.  Thought content does not include homicidal or suicidal plan.         Cognition and Memory: Memory is not impaired.         Judgment: Judgment normal.     Physical Exam    TEST RESULTS:  Patient underwent labs on July 10, 2025. The results showed stable blood counts with nothing significantly abnormal. Her kidney function was good, and liver function was normal. Electrolytes (sodium, potassium, chloride) were within normal range. Cholesterol levels were good while on Atorvastatin 40 mg. A1C was 5.5, which is normal. Thyroid levels were borderline while on Levothyroxine 25 mcg.  IMAGING:  Patient has a GI scope scheduled.         Assessment:     1. Neck pain    2. Acquired hypothyroidism    3. Encounter for long-term (current) use of medications    4. Dyslipidemia      MDM:   Moderate medical complexity.  Moderate risk.  Total time: 22 minutes.  This includes total time spent on the encounter, which includes face to face time and non-face to face time preparing to see the patient (eg, review of previous medical records, tests), Obtaining and/or reviewing separately obtained history, documenting clinical information in the electronic or other health record, independently interpreting results (not separately reported)/communicating results to the patient/family/caregiver, and/or care coordination (not separately reported).    I have Reviewed and summarized old records.  I have performed thorough medication reconciliation today and discussed risk and benefits of medications.  I have reviewed labs and discussed with patient.  All questions were answered.  I am requesting old records and will review them once they are available.  Visit today included increased complexity associated with the care of the episodic problem see above assessment addressed and managing the longitudinal care of the patient due to the serious and/or complex managed problem(s) see above.    I have signed for the following orders AND/OR meds.  Orders  Placed This Encounter   Procedures    TSH     Standing Status:   Standing     Number of Occurrences:   99     Next Expected Occurrence:   7/18/2025     Expiration Date:   7/17/2044     Send normal result to authorizing provider's In Basket if patient is active on MyChart::   Yes    T4, Free     Standing Status:   Standing     Number of Occurrences:   99     Next Expected Occurrence:   7/18/2025     Expiration Date:   7/17/2044     Send normal result to authorizing provider's In Basket if patient is active on MyChart::   Yes    T3, Free     Standing Status:   Standing     Number of Occurrences:   99     Next Expected Occurrence:   7/18/2025     Expiration Date:   7/17/2044     Medications Ordered This Encounter   Medications    levothyroxine (SYNTHROID) 50 MCG tablet     Sig: Take 1 tablet (50 mcg total) by mouth before breakfast.     Dispense:  90 tablet     Refill:  1        Follow up in about 6 months (around 1/18/2026), or if symptoms worsen or fail to improve, for Med refills, LAB RESULTS.  Future Appointments       Date Specialty Appt Notes    10/17/2025 Lab           If no improvement in symptoms or symptoms worsen, advised to call/follow-up at clinic or go to ER. Patient voiced understanding and all questions/concerns were addressed.   DISCLAIMER: This note was compiled by using a speech recognition dictation system and therefore please be aware that typographical / speech recognition errors can and do occur.  Please contact me if you see any errors specifically.  Consent was obtained for recording system prior to the visit.    This note was generated with the assistance of ambient listening technology. Verbal consent was obtained by the patient and accompanying visitor(s) for the recording of patient appointment to facilitate this note. I attest to having reviewed and edited the generated note for accuracy, though some syntax or spelling errors may persist. Please contact the author of this note for any  clarification.          Agustín Carter M.D.       Office: [phone] [address] [place] [address]  FAX: [phone]

## 2025-07-22 ENCOUNTER — ANESTHESIA EVENT (OUTPATIENT)
Dept: ENDOSCOPY | Facility: HOSPITAL | Age: 75
End: 2025-07-22
Payer: MEDICARE

## 2025-07-22 ENCOUNTER — HOSPITAL ENCOUNTER (OUTPATIENT)
Facility: HOSPITAL | Age: 75
Discharge: HOME | End: 2025-07-22
Attending: INTERNAL MEDICINE | Admitting: INTERNAL MEDICINE
Payer: MEDICARE

## 2025-07-22 ENCOUNTER — ANESTHESIA (OUTPATIENT)
Dept: ENDOSCOPY | Facility: HOSPITAL | Age: 75
End: 2025-07-22
Payer: MEDICARE

## 2025-07-22 DIAGNOSIS — K21.9 GERD (GASTROESOPHAGEAL REFLUX DISEASE): ICD-10-CM

## 2025-07-22 DIAGNOSIS — K21.9 GASTROESOPHAGEAL REFLUX DISEASE, UNSPECIFIED WHETHER ESOPHAGITIS PRESENT: ICD-10-CM

## 2025-07-22 PROCEDURE — 37000009 HC ANESTHESIA EA ADD 15 MINS: Mod: HCNC,PO | Performed by: INTERNAL MEDICINE

## 2025-07-22 PROCEDURE — 88342 IMHCHEM/IMCYTCHM 1ST ANTB: CPT | Mod: 26,HCNC,, | Performed by: PATHOLOGY

## 2025-07-22 PROCEDURE — 43239 EGD BIOPSY SINGLE/MULTIPLE: CPT | Mod: HCNC,PO | Performed by: INTERNAL MEDICINE

## 2025-07-22 PROCEDURE — 88305 TISSUE EXAM BY PATHOLOGIST: CPT | Mod: 26,HCNC,, | Performed by: PATHOLOGY

## 2025-07-22 PROCEDURE — 88342 IMHCHEM/IMCYTCHM 1ST ANTB: CPT | Mod: TC,59,HCNC | Performed by: INTERNAL MEDICINE

## 2025-07-22 PROCEDURE — 37000008 HC ANESTHESIA 1ST 15 MINUTES: Mod: HCNC,PO | Performed by: INTERNAL MEDICINE

## 2025-07-22 PROCEDURE — 43239 EGD BIOPSY SINGLE/MULTIPLE: CPT | Mod: HCNC,,, | Performed by: INTERNAL MEDICINE

## 2025-07-22 PROCEDURE — 27201012 HC FORCEPS, HOT/COLD, DISP: Mod: HCNC,PO | Performed by: INTERNAL MEDICINE

## 2025-07-22 PROCEDURE — 63600175 PHARM REV CODE 636 W HCPCS: Mod: HCNC,PO | Performed by: INTERNAL MEDICINE

## 2025-07-22 PROCEDURE — 63600175 PHARM REV CODE 636 W HCPCS: Mod: HCNC,PO | Performed by: ANESTHESIOLOGY

## 2025-07-22 RX ORDER — PROPOFOL 10 MG/ML
VIAL (ML) INTRAVENOUS
Status: DISCONTINUED | OUTPATIENT
Start: 2025-07-22 | End: 2025-07-22

## 2025-07-22 RX ORDER — LIDOCAINE HYDROCHLORIDE 20 MG/ML
INJECTION INTRAVENOUS
Status: DISCONTINUED | OUTPATIENT
Start: 2025-07-22 | End: 2025-07-22

## 2025-07-22 RX ORDER — SODIUM CHLORIDE 0.9 % (FLUSH) 0.9 %
10 SYRINGE (ML) INJECTION
Status: DISCONTINUED | OUTPATIENT
Start: 2025-07-22 | End: 2025-07-22 | Stop reason: HOSPADM

## 2025-07-22 RX ORDER — SODIUM CHLORIDE, SODIUM LACTATE, POTASSIUM CHLORIDE, CALCIUM CHLORIDE 600; 310; 30; 20 MG/100ML; MG/100ML; MG/100ML; MG/100ML
INJECTION, SOLUTION INTRAVENOUS CONTINUOUS
Status: DISCONTINUED | OUTPATIENT
Start: 2025-07-22 | End: 2025-07-22 | Stop reason: HOSPADM

## 2025-07-22 RX ADMIN — PROPOFOL 20 MG: 10 INJECTION, EMULSION INTRAVENOUS at 07:07

## 2025-07-22 RX ADMIN — PROPOFOL 100 MG: 10 INJECTION, EMULSION INTRAVENOUS at 07:07

## 2025-07-22 RX ADMIN — LIDOCAINE HYDROCHLORIDE 100 MG: 20 INJECTION INTRAVENOUS at 07:07

## 2025-07-22 RX ADMIN — PROPOFOL 30 MG: 10 INJECTION, EMULSION INTRAVENOUS at 07:07

## 2025-07-22 RX ADMIN — SODIUM CHLORIDE, POTASSIUM CHLORIDE, SODIUM LACTATE AND CALCIUM CHLORIDE: 600; 310; 30; 20 INJECTION, SOLUTION INTRAVENOUS at 06:07

## 2025-07-22 RX ADMIN — PROPOFOL 50 MG: 10 INJECTION, EMULSION INTRAVENOUS at 07:07

## 2025-07-22 NOTE — ANESTHESIA POSTPROCEDURE EVALUATION
Anesthesia Post Evaluation    Patient: Flores Majortiff    Procedure(s) Performed: Procedure(s) (LRB):  EGD (ESOPHAGOGASTRODUODENOSCOPY) (N/A)    Final Anesthesia Type: general      Patient location during evaluation: PACU  Patient participation: Yes- Able to Participate  Level of consciousness: awake and alert and oriented  Post-procedure vital signs: reviewed and stable  Pain management: adequate  Airway patency: patent    PONV status at discharge: No PONV  Anesthetic complications: no      Cardiovascular status: blood pressure returned to baseline  Respiratory status: unassisted, spontaneous ventilation and room air  Hydration status: euvolemic  Follow-up not needed.              Vitals Value Taken Time   /69 07/22/25 07:41   Temp 36.3 °C (97.4 °F) 07/22/25 07:17   Pulse 65 07/22/25 07:41   Resp 16 07/22/25 07:41   SpO2 98 % 07/22/25 07:41         Event Time   Out of Recovery 07:30:00         Pain/Gianfranco Score: Gianfranco Score: 10 (7/22/2025  7:41 AM)

## 2025-07-22 NOTE — TRANSFER OF CARE
"Anesthesia Transfer of Care Note    Patient: Flores Adamson    Procedure(s) Performed: Procedure(s) (LRB):  EGD (ESOPHAGOGASTRODUODENOSCOPY) (N/A)    Patient location: PACU    Anesthesia Type: general    Transport from OR: Transported from OR on room air with adequate spontaneous ventilation    Post pain: adequate analgesia    Post assessment: no apparent anesthetic complications and tolerated procedure well    Post vital signs: stable    Level of consciousness: awake    Nausea/Vomiting: no nausea/vomiting    Complications: none    Transfer of care protocol was followed      Last vitals: Visit Vitals  /60 (BP Location: Left arm, Patient Position: Lying)   Pulse 63   Temp 36.3 °C (97.4 °F) (Skin)   Resp 12   Ht 5' 2" (1.575 m)   Wt 79.8 kg (176 lb)   SpO2 96%   Breastfeeding No   BMI 32.19 kg/m²     "

## 2025-07-22 NOTE — PROVATION PATIENT INSTRUCTIONS
Discharge Summary/Instructions after an Endoscopic Procedure  Patient Name: Flores Adamson  Patient MRN: [id number]  Patient YOB: 1950  Tuesday, July 22, 2025  Marvin Alvarado MD  Dear patient,  As a result of recent federal legislation (The Federal Cures Act), you may   receive lab or pathology results from your procedure in your [facility]   account before your physician is able to contact you. Your physician or   their representative will relay the results to you with their   recommendations at their soonest availability.  Thank you,  RESTRICTIONS:  During your procedure today, you received medications for sedation.  These   medications may affect your judgment, balance and coordination.  Therefore,   for 24 hours, you have the following restrictions:   - DO NOT drive a car, operate machinery, make legal/financial decisions,   sign important papers or drink alcohol.    ACTIVITY:  Today: no heavy lifting, straining or running due to procedural   sedation/anesthesia.  The following day: return to full activity including work.  DIET:  Eat and drink normally unless instructed otherwise.     TREATMENT FOR COMMON SIDE EFFECTS:  - Mild abdominal pain, nausea, belching, bloating or excessive gas:  rest,   eat lightly and use a heating pad.  - Sore Throat: treat with throat lozenges and/or gargle with warm salt   water.  - Because air was used during the procedure, expelling large amounts of air   from your rectum or belching is normal.  - If a bowel prep was taken, you may not have a bowel movement for 1-3 days.    This is normal.  SYMPTOMS TO WATCH FOR AND REPORT TO YOUR PHYSICIAN:  1. Abdominal pain or bloating, other than gas cramps.  2. Chest pain.  3. Back pain.  4. Signs of infection such as: chills or fever occurring within 24 hours   after the procedure.  5. Rectal bleeding, which would show as bright red, maroon, or black stools.   (A tablespoon of blood from the rectum is not serious, especially  if   hemorrhoids are present.)  6. Vomiting.  7. Weakness or dizziness.  GO DIRECTLY TO THE NEAREST EMERGENCY ROOM IF YOU HAVE ANY OF THE FOLLOWING:      Difficulty breathing              Chills and/or fever over 101 F   Persistent vomiting and/or vomiting blood   Severe abdominal pain   Severe chest pain   Black, tarry stools   Bleeding- more than one tablespoon   Any other symptom or condition that you feel may need urgent attention  Your doctor recommends these additional instructions:  If any biopsies were taken, your doctors clinic will contact you in 1 to 2   weeks with any results.  We are waiting for your pathology results.   Continue your present medications.   You are being discharged to home.  For questions, problems or results please call your physician - Marvin Alvarado MD at Work:  [phone].  EMERGENCY PHONE NUMBER: [phone], LAB RESULTS: [phone]  IF A COMPLICATION OR EMERGENCY SITUATION ARISES AND YOU ARE UNABLE TO REACH   YOUR PHYSICIAN - GO DIRECTLY TO THE EMERGENCY ROOM.  ___________________________________________  Nurse Signature  ___________________________________________  Patient/Designated Responsible Party Signature  Marvin Alvarado MD  7/22/2025 7:15:34 AM  This report has been verified and signed electronically.  Dear patient,  As a result of recent federal legislation (The Federal Cures Act), you may   receive lab or pathology results from your procedure in your [facility]   account before your physician is able to contact you. Your physician or   their representative will relay the results to you with their   recommendations at their soonest availability.  Thank you.  PROVATION

## 2025-07-22 NOTE — H&P
History & Physical - Short Stay  Gastroenterology      SUBJECTIVE:     Procedure: EGD    Chief Complaint/Indication for Procedure: Reflux    PTA Medications   Medication Sig    ascorbic acid, vitamin C, (VITAMIN C) 500 MG tablet Take 500 mg by mouth once daily.    aspirin (ECOTRIN) 81 MG EC tablet Take 81 mg by mouth once daily.    atorvastatin (LIPITOR) 40 MG tablet TAKE 1 TABLET EVERY DAY    calcium-vitamin D3 (OS-SHEILA 500 + D3) 500 mg(1,250mg) -200 unit per tablet Take 1 tablet by mouth 2 (two) times daily with meals.    esomeprazole (NEXIUM) 40 MG capsule Take 1 capsule (40 mg total) by mouth 2 (two) times daily.    fluticasone propionate (FLONASE) 50 mcg/actuation nasal spray 1 spray (50 mcg total) by Each Nostril route once daily.    gabapentin (NEURONTIN) 300 MG capsule TAKE 3 CAPSULES EVERY EVENING    levothyroxine (SYNTHROID) 50 MCG tablet Take 1 tablet (50 mcg total) by mouth before breakfast.    multivitamin (THERAGRAN) per tablet Take 1 tablet by mouth once daily.    acetaminophen (TYLENOL) 650 MG TbSR Take 650 mg by mouth every 8 (eight) hours.    carbamide peroxide (DEBROX) 6.5 % otic solution Place 5 drops into both ears 2 (two) times daily.    nitroGLYCERIN (NITROSTAT) 0.4 MG SL tablet Place 0.4 mg under the tongue.    ondansetron (ZOFRAN) 8 MG tablet Take 1 tablet (8 mg total) by mouth every 8 (eight) hours as needed for Nausea.       Review of patient's allergies indicates:   Allergen Reactions    Sulfa (sulfonamide antibiotics) Shortness Of Breath        Past Medical History:   Diagnosis Date    Acquired hypothyroidism 10/6/2021    Anemia 9/18/2023    Angina pectoris     Anticoagulant long-term use     CAD (coronary artery disease)     with stents x 3    GERD (gastroesophageal reflux disease)      Past Surgical History:   Procedure Laterality Date    2006 2 stents      2009 1 stent      CHOLECYSTECTOMY  2021    Dr. Roach    ESOPHAGOGASTRODUODENOSCOPY N/A 11/28/2018    Procedure: EGD  (ESOPHAGOGASTRODUODENOSCOPY);  Surgeon: Marvin Alvarado MD;  Location[facility];  Service: Endoscopy;  Laterality: N/A;    WRIST FRACTURE SURGERY  03/2018     Family History   Problem Relation Name Age of Onset    Heart disease Father       Social History[1]      OBJECTIVE:     Vital Signs (Most Recent)  Temp: 97.5 °F (36.4 °C) (07/22/25 0617)  Pulse: 68 (07/22/25 0617)  Resp: 15 (07/22/25 0617)  BP: (!) 166/77 (07/22/25 0617)  SpO2: 97 % (07/22/25 0617)    Physical Exam:                                                       GENERAL:  Comfortable, in no acute distress.                                 HEENT EXAM:  Nonicteric.  No adenopathy.  Oropharynx is clear.               NECK:  Supple.                                                               LUNGS:  Clear.                                                               CARDIAC:  Regular rate and rhythm.  S1, S2.  No murmur.                      ABDOMEN:  Soft, positive bowel sounds, nontender.  No hepatosplenomegaly or masses.  No rebound or guarding.                                             EXTREMITIES:  No edema.     MENTAL STATUS:  Normal, alert and oriented.      ASSESSMENT/PLAN:     Assessment: Reflux    Plan: EGD    Anesthesia Plan: General    ASA Grade: ASA 2 - Patient with mild systemic disease with no functional limitations    MALLAMPATI SCORE:  I (soft palate, uvula, fauces, and tonsillar pillars visible)           [1]   Social History  Tobacco Use    Smoking status: Never    Smokeless tobacco: Never   Substance Use Topics    Alcohol use: No    Drug use: No

## 2025-07-22 NOTE — ANESTHESIA PREPROCEDURE EVALUATION
07/22/2025  Flores Adamson is a 74 y.o., female.      Pre-op Assessment    I have reviewed the NPO Status.   I have reviewed the Medications.     Review of Systems  Anesthesia Hx:  No problems with previous Anesthesia                Cardiovascular:  Exercise tolerance: good      CAD      Angina             Cardiovascular Symptoms: Angina       Coronary Artery Disease:                                  Pulmonary:  Pulmonary Normal                       Hepatic/GI:     GERD         Gerd          Endocrine:   Hypothyroidism       Hypothyroidism              Physical Exam  General: Well nourished    Airway:  Mallampati: II   Mouth Opening: Normal  TM Distance: Normal  Tongue: Normal  Neck ROM: Normal ROM    Dental:  Intact    Chest/Lungs:  Clear to auscultation, Normal Respiratory Rate        Anesthesia Plan  Type of Anesthesia, risks & benefits discussed:    Anesthesia Type: Gen Natural Airway  Intra-op Monitoring Plan: Standard ASA Monitors  Induction:  IV  Informed Consent: Informed consent signed with the Patient and all parties understand the risks and agree with anesthesia plan.  All questions answered.   ASA Score: 2    Ready For Surgery From Anesthesia Perspective.     .

## 2025-07-22 NOTE — DISCHARGE SUMMARY
[facility] - Endoscopy  Discharge Note  Short Stay  Discharge Note  Short Stay      SUMMARY     Admit Date: 7/22/2025    Attending Physician: Marvin Alvarado MD     Discharge Physician: Marvin Alvarado MD    Discharge Date: 7/22/2025 7:17 AM    Final Diagnosis: Gastroesophageal reflux disease, unspecified whether esophagitis present [K21.9]    Disposition: HOME OR SELF CARE    Patient Instructions:   Current Discharge Medication List        CONTINUE these medications which have NOT CHANGED    Details   ascorbic acid, vitamin C, (VITAMIN C) 500 MG tablet Take 500 mg by mouth once daily.      aspirin (ECOTRIN) 81 MG EC tablet Take 81 mg by mouth once daily.      atorvastatin (LIPITOR) 40 MG tablet TAKE 1 TABLET EVERY DAY  Qty: 90 tablet, Refills: 3    Associated Diagnoses: Dyslipidemia      calcium-vitamin D3 (OS-SHEILA 500 + D3) 500 mg(1,250mg) -200 unit per tablet Take 1 tablet by mouth 2 (two) times daily with meals.      esomeprazole (NEXIUM) 40 MG capsule Take 1 capsule (40 mg total) by mouth 2 (two) times daily.  Qty: 180 capsule, Refills: 3    Associated Diagnoses: Gastroesophageal reflux disease with esophagitis without hemorrhage      fluticasone propionate (FLONASE) 50 mcg/actuation nasal spray 1 spray (50 mcg total) by Each Nostril route once daily.  Qty: 48 g, Refills: 3    Comments: **Patient requests 90 days supply**      gabapentin (NEURONTIN) 300 MG capsule TAKE 3 CAPSULES EVERY EVENING  Qty: 90 capsule, Refills: 11      levothyroxine (SYNTHROID) 50 MCG tablet Take 1 tablet (50 mcg total) by mouth before breakfast.  Qty: 90 tablet, Refills: 1    Associated Diagnoses: Acquired hypothyroidism      multivitamin (THERAGRAN) per tablet Take 1 tablet by mouth once daily.      acetaminophen (TYLENOL) 650 MG TbSR Take 650 mg by mouth every 8 (eight) hours.      carbamide peroxide (DEBROX) 6.5 % otic solution Place 5 drops into both ears 2 (two) times daily.  Qty: 15 mL, Refills: 0    Associated  Diagnoses: Ceruminosis, bilateral      nitroGLYCERIN (NITROSTAT) 0.4 MG SL tablet Place 0.4 mg under the tongue.      ondansetron (ZOFRAN) 8 MG tablet Take 1 tablet (8 mg total) by mouth every 8 (eight) hours as needed for Nausea.  Qty: 10 tablet, Refills: 0    Associated Diagnoses: Nausea             Discharge Procedure Orders (must include Diet, Follow-up, Activity)    Follow Up:  Follow up with PCP as previously scheduled  Resume routine diet.  Activity as tolerated.    No driving day of procedure.

## 2025-07-23 VITALS
HEART RATE: 65 BPM | SYSTOLIC BLOOD PRESSURE: 126 MMHG | BODY MASS INDEX: 32.39 KG/M2 | OXYGEN SATURATION: 98 % | WEIGHT: 176 LBS | DIASTOLIC BLOOD PRESSURE: 69 MMHG | HEIGHT: 62 IN | TEMPERATURE: 97 F | RESPIRATION RATE: 16 BRPM

## 2025-07-25 LAB
ESTRIOL SERPL-MCNC: NORMAL NG/ML
ESTROGEN SERPL-MCNC: NORMAL PG/ML
INSULIN SERPL-ACNC: NORMAL U[IU]/ML
LAB AP CLINICAL INFORMATION: NORMAL
LAB AP GROSS DESCRIPTION: NORMAL
LAB AP PERFORMING LOCATION(S): NORMAL
LAB AP REPORT FOOTNOTES: NORMAL

## 2025-07-28 ENCOUNTER — TELEPHONE (OUTPATIENT)
Dept: GASTROENTEROLOGY | Facility: CLINIC | Age: 75
End: 2025-07-28
Payer: MEDICARE

## 2025-07-28 NOTE — TELEPHONE ENCOUNTER
Copied from CRM #[id number]. Topic: General Inquiry - Test Results  >> Jul 28, 2025  2:04 PM Erika wrote:  .Type:  Test Results    Who Called: Yolis Adamson  Name of Test (Lab/Mammo/Etc): EGD (ESOPHAGOGASTRODUODENOSCOPY) [572]  Date of Test: 7/22  Ordering Provider:    Where the test was performed:   Would the patient rather a call back or a response via [facility]? Call back  Best Call Back Number: .[phone]    Additional Information: pt is calling to request a call back with test results from her procedure

## 2025-07-29 NOTE — TELEPHONE ENCOUNTER
Tell pt EGD bx benign gastritis, negative for H pylori bacteria.  Continue Nexium.  F/U NP Price as needed.

## 2025-07-30 NOTE — TELEPHONE ENCOUNTER
Spoke with pt. Informed of results & recommendations per Dr. Alvarado. Pt verbalized understanding.

## 2025-08-04 DIAGNOSIS — E78.5 DYSLIPIDEMIA: ICD-10-CM

## 2025-08-04 RX ORDER — ATORVASTATIN CALCIUM 40 MG/1
40 TABLET, FILM COATED ORAL
Qty: 90 TABLET | Refills: 3 | OUTPATIENT
Start: 2025-08-04

## 2025-08-05 ENCOUNTER — ANESTHESIA (OUTPATIENT)
Dept: ENDOSCOPY | Facility: HOSPITAL | Age: 75
End: 2025-08-05
Payer: MEDICARE

## 2025-08-05 ENCOUNTER — HOSPITAL ENCOUNTER (OUTPATIENT)
Facility: HOSPITAL | Age: 75
Discharge: HOME | End: 2025-08-05
Attending: INTERNAL MEDICINE | Admitting: INTERNAL MEDICINE
Payer: MEDICARE

## 2025-08-05 ENCOUNTER — ANESTHESIA EVENT (OUTPATIENT)
Dept: ENDOSCOPY | Facility: HOSPITAL | Age: 75
End: 2025-08-05
Payer: MEDICARE

## 2025-08-05 VITALS
HEIGHT: 62 IN | TEMPERATURE: 97 F | OXYGEN SATURATION: 95 % | DIASTOLIC BLOOD PRESSURE: 73 MMHG | RESPIRATION RATE: 18 BRPM | HEART RATE: 78 BPM | SYSTOLIC BLOOD PRESSURE: 128 MMHG | WEIGHT: 177 LBS | BODY MASS INDEX: 32.57 KG/M2

## 2025-08-05 DIAGNOSIS — Z12.11 SCREEN FOR COLON CANCER: ICD-10-CM

## 2025-08-05 DIAGNOSIS — Z12.11 COLON CANCER SCREENING: ICD-10-CM

## 2025-08-05 PROCEDURE — 45385 COLONOSCOPY W/LESION REMOVAL: CPT | Mod: PT,HCNC,, | Performed by: INTERNAL MEDICINE

## 2025-08-05 PROCEDURE — 45385 COLONOSCOPY W/LESION REMOVAL: CPT | Mod: PT,HCNC,PO | Performed by: INTERNAL MEDICINE

## 2025-08-05 PROCEDURE — 63600175 PHARM REV CODE 636 W HCPCS: Mod: HCNC,PO | Performed by: INTERNAL MEDICINE

## 2025-08-05 PROCEDURE — 37000009 HC ANESTHESIA EA ADD 15 MINS: Mod: HCNC,PO | Performed by: INTERNAL MEDICINE

## 2025-08-05 PROCEDURE — 88305 TISSUE EXAM BY PATHOLOGIST: CPT | Mod: TC,HCNC | Performed by: INTERNAL MEDICINE

## 2025-08-05 PROCEDURE — 63600175 PHARM REV CODE 636 W HCPCS: Mod: HCNC,PO | Performed by: NURSE ANESTHETIST, CERTIFIED REGISTERED

## 2025-08-05 PROCEDURE — 37000008 HC ANESTHESIA 1ST 15 MINUTES: Mod: HCNC,PO | Performed by: INTERNAL MEDICINE

## 2025-08-05 PROCEDURE — 27201089 HC SNARE, DISP (ANY): Mod: HCNC,PO | Performed by: INTERNAL MEDICINE

## 2025-08-05 PROCEDURE — 88305 TISSUE EXAM BY PATHOLOGIST: CPT | Mod: 26,HCNC,, | Performed by: PATHOLOGY

## 2025-08-05 RX ORDER — PROPOFOL 10 MG/ML
VIAL (ML) INTRAVENOUS
Status: DISCONTINUED | OUTPATIENT
Start: 2025-08-05 | End: 2025-08-05

## 2025-08-05 RX ORDER — SODIUM CHLORIDE, SODIUM LACTATE, POTASSIUM CHLORIDE, CALCIUM CHLORIDE 600; 310; 30; 20 MG/100ML; MG/100ML; MG/100ML; MG/100ML
INJECTION, SOLUTION INTRAVENOUS CONTINUOUS
Status: DISCONTINUED | OUTPATIENT
Start: 2025-08-05 | End: 2025-08-05 | Stop reason: HOSPADM

## 2025-08-05 RX ORDER — SODIUM CHLORIDE 0.9 % (FLUSH) 0.9 %
10 SYRINGE (ML) INJECTION
Status: DISCONTINUED | OUTPATIENT
Start: 2025-08-05 | End: 2025-08-05 | Stop reason: HOSPADM

## 2025-08-05 RX ADMIN — PROPOFOL 20 MG: 10 INJECTION, EMULSION INTRAVENOUS at 10:08

## 2025-08-05 RX ADMIN — SODIUM CHLORIDE, POTASSIUM CHLORIDE, SODIUM LACTATE AND CALCIUM CHLORIDE: 600; 310; 30; 20 INJECTION, SOLUTION INTRAVENOUS at 09:08

## 2025-08-05 RX ADMIN — SODIUM CHLORIDE, SODIUM LACTATE, POTASSIUM CHLORIDE, AND CALCIUM CHLORIDE: .6; .31; .03; .02 INJECTION, SOLUTION INTRAVENOUS at 10:08

## 2025-08-05 RX ADMIN — PROPOFOL 100 MG: 10 INJECTION, EMULSION INTRAVENOUS at 10:08

## 2025-08-05 NOTE — TRANSFER OF CARE
"Anesthesia Transfer of Care Note    Patient: Flores Adamson    Procedure(s) Performed: Procedure(s) (LRB):  COLONOSCOPY, SCREENING, LOW RISK PATIENT (N/A)    Patient location: PACU    Anesthesia Type: general    Transport from OR: Transported from OR on room air with adequate spontaneous ventilation    Post pain: adequate analgesia    Post assessment: no apparent anesthetic complications    Post vital signs: stable    Level of consciousness: awake    Nausea/Vomiting: no nausea/vomiting    Complications: none    Transfer of care protocol was followed    Last vitals: Visit Vitals  BP (!) 141/67 (BP Location: Right arm, Patient Position: Lying)   Pulse 67   Temp 36.4 °C (97.5 °F) (Skin)   Resp 15   Ht 5' 2" (1.575 m)   Wt 80.3 kg (177 lb)   SpO2 95%   Breastfeeding No   BMI 32.37 kg/m²     "

## 2025-08-05 NOTE — ANESTHESIA PREPROCEDURE EVALUATION
08/05/2025  Flores Adamson is a 74 y.o., female.      Pre-op Assessment    I have reviewed the Patient Summary Reports.     I have reviewed the Nursing Notes. I have reviewed the NPO Status.   I have reviewed the Medications.     Review of Systems  Anesthesia Hx:  No problems with previous Anesthesia                Social:  Non-Smoker       Cardiovascular:  Exercise tolerance: good      CAD   CABG/stent   Angina             Cardiovascular Symptoms: Angina       Coronary Artery Disease:                                  Pulmonary:  Pulmonary Normal                       Hepatic/GI:  Bowel Prep.   GERD         Gerd          Endocrine:   Hypothyroidism       Hypothyroidism        Obesity / BMI > 30      Physical Exam  General: Well nourished    Airway:  Mallampati: II   Mouth Opening: Normal  TM Distance: Normal  Tongue: Normal  Neck ROM: Normal ROM    Dental:  Intact    Chest/Lungs:  Clear to auscultation, Normal Respiratory Rate        Anesthesia Plan  Type of Anesthesia, risks & benefits discussed:    Anesthesia Type: Gen Natural Airway  Intra-op Monitoring Plan: Standard ASA Monitors  Induction:  IV  Informed Consent: Informed consent signed with the Patient and all parties understand the risks and agree with anesthesia plan.  All questions answered.   ASA Score: 3  Day of Surgery Review of History & Physical: H&P Update referred to the surgeon/provider.    Ready For Surgery From Anesthesia Perspective.     .

## 2025-08-05 NOTE — ANESTHESIA POSTPROCEDURE EVALUATION
Anesthesia Post Evaluation    Patient: Flores MENG Snow    Procedure(s) Performed: Procedure(s) (LRB):  COLONOSCOPY, SCREENING, LOW RISK PATIENT (N/A)    Final Anesthesia Type: general      Patient location during evaluation: PACU  Patient participation: Yes- Able to Participate  Level of consciousness: awake and alert  Post-procedure vital signs: reviewed and stable  Pain management: adequate  Airway patency: patent    PONV status at discharge: No PONV  Anesthetic complications: no      Cardiovascular status: hemodynamically stable  Respiratory status: unassisted and room air  Hydration status: euvolemic  Follow-up not needed.              Vitals Value Taken Time   /73 08/05/25 11:02   Temp 36.1 °C (97 °F) 08/05/25 10:36   Pulse 78 08/05/25 11:02   Resp 18 08/05/25 11:02   SpO2 95 % 08/05/25 11:02         Event Time   Out of Recovery 11:29:35         Pain/Gianfranco Score: Gianfranco Score: 10 (8/5/2025 11:02 AM)

## 2025-08-07 LAB
ESTROGEN SERPL-MCNC: NORMAL PG/ML
INSULIN SERPL-ACNC: NORMAL U[IU]/ML
LAB AP CLINICAL INFORMATION: NORMAL
LAB AP GROSS DESCRIPTION: NORMAL
LAB AP PERFORMING LOCATION(S): NORMAL
LAB AP REPORT FOOTNOTES: NORMAL